# Patient Record
Sex: FEMALE | Race: WHITE | Employment: FULL TIME | URBAN - METROPOLITAN AREA
[De-identification: names, ages, dates, MRNs, and addresses within clinical notes are randomized per-mention and may not be internally consistent; named-entity substitution may affect disease eponyms.]

---

## 2017-05-22 ENCOUNTER — OFFICE VISIT (OUTPATIENT)
Dept: FAMILY MEDICINE CLINIC | Age: 62
End: 2017-05-22

## 2017-05-22 VITALS
RESPIRATION RATE: 16 BRPM | WEIGHT: 199 LBS | HEART RATE: 60 BPM | TEMPERATURE: 98.3 F | DIASTOLIC BLOOD PRESSURE: 76 MMHG | BODY MASS INDEX: 36.62 KG/M2 | HEIGHT: 62 IN | SYSTOLIC BLOOD PRESSURE: 124 MMHG

## 2017-05-22 DIAGNOSIS — R45.89 DEPRESSED MOOD: ICD-10-CM

## 2017-05-22 DIAGNOSIS — Z12.31 VISIT FOR SCREENING MAMMOGRAM: ICD-10-CM

## 2017-05-22 DIAGNOSIS — E78.2 MIXED HYPERLIPIDEMIA: Primary | ICD-10-CM

## 2017-05-22 DIAGNOSIS — F41.9 ANXIETY: ICD-10-CM

## 2017-05-22 PROCEDURE — 99214 OFFICE O/P EST MOD 30 MIN: CPT | Performed by: FAMILY MEDICINE

## 2017-05-22 ASSESSMENT — PATIENT HEALTH QUESTIONNAIRE - PHQ9
1. LITTLE INTEREST OR PLEASURE IN DOING THINGS: 0
SUM OF ALL RESPONSES TO PHQ QUESTIONS 1-9: 0
SUM OF ALL RESPONSES TO PHQ9 QUESTIONS 1 & 2: 0
2. FEELING DOWN, DEPRESSED OR HOPELESS: 0

## 2017-09-25 ENCOUNTER — OFFICE VISIT (OUTPATIENT)
Dept: FAMILY MEDICINE CLINIC | Age: 62
End: 2017-09-25

## 2017-09-25 VITALS
HEIGHT: 62 IN | DIASTOLIC BLOOD PRESSURE: 84 MMHG | TEMPERATURE: 97.7 F | RESPIRATION RATE: 14 BRPM | WEIGHT: 212 LBS | BODY MASS INDEX: 39.01 KG/M2 | HEART RATE: 72 BPM | SYSTOLIC BLOOD PRESSURE: 132 MMHG

## 2017-09-25 DIAGNOSIS — Z23 NEED FOR INFLUENZA VACCINATION: ICD-10-CM

## 2017-09-25 DIAGNOSIS — R45.89 DEPRESSED MOOD: ICD-10-CM

## 2017-09-25 DIAGNOSIS — Z12.4 CERVICAL CANCER SCREENING: Primary | ICD-10-CM

## 2017-09-25 DIAGNOSIS — F41.9 ANXIETY: ICD-10-CM

## 2017-09-25 DIAGNOSIS — E78.2 MIXED HYPERLIPIDEMIA: ICD-10-CM

## 2017-09-25 DIAGNOSIS — Z12.4 CERVICAL CANCER SCREENING: ICD-10-CM

## 2017-09-25 PROCEDURE — 99396 PREV VISIT EST AGE 40-64: CPT | Performed by: FAMILY MEDICINE

## 2017-09-25 PROCEDURE — 90686 IIV4 VACC NO PRSV 0.5 ML IM: CPT | Performed by: FAMILY MEDICINE

## 2017-09-25 PROCEDURE — 90471 IMMUNIZATION ADMIN: CPT | Performed by: FAMILY MEDICINE

## 2017-09-27 ENCOUNTER — HOSPITAL ENCOUNTER (OUTPATIENT)
Dept: WOMENS IMAGING | Age: 62
Discharge: HOME | End: 2017-09-27
Payer: COMMERCIAL

## 2017-09-27 DIAGNOSIS — Z12.31 VISIT FOR SCREENING MAMMOGRAM: ICD-10-CM

## 2017-09-27 PROCEDURE — G0202 SCR MAMMO BI INCL CAD: HCPCS

## 2017-09-28 LAB
HPV COMMENT: ABNORMAL
HPV TYPE 16: DETECTED
HPV TYPE 18: NOT DETECTED
HPVOH (OTHER TYPES): NOT DETECTED

## 2017-11-23 ENCOUNTER — APPOINTMENT (OUTPATIENT)
Dept: CT IMAGING | Age: 62
End: 2017-11-23
Payer: COMMERCIAL

## 2017-11-23 ENCOUNTER — HOSPITAL ENCOUNTER (EMERGENCY)
Age: 62
Discharge: HOME | End: 2017-11-23
Attending: EMERGENCY MEDICINE
Payer: COMMERCIAL

## 2017-11-23 VITALS
SYSTOLIC BLOOD PRESSURE: 140 MMHG | HEART RATE: 55 BPM | RESPIRATION RATE: 16 BRPM | HEIGHT: 63 IN | DIASTOLIC BLOOD PRESSURE: 72 MMHG | BODY MASS INDEX: 36.14 KG/M2 | WEIGHT: 204 LBS | TEMPERATURE: 97.5 F | OXYGEN SATURATION: 99 %

## 2017-11-23 DIAGNOSIS — N39.0 URINARY TRACT INFECTION WITHOUT HEMATURIA, SITE UNSPECIFIED: ICD-10-CM

## 2017-11-23 DIAGNOSIS — N20.0 NEPHROLITHIASIS: Primary | ICD-10-CM

## 2017-11-23 DIAGNOSIS — R11.0 NAUSEA: ICD-10-CM

## 2017-11-23 LAB
ALBUMIN SERPL-MCNC: 4 G/DL (ref 3.9–4.9)
ALP BLD-CCNC: 76 U/L (ref 40–130)
ALT SERPL-CCNC: 14 U/L (ref 0–33)
ANION GAP SERPL CALCULATED.3IONS-SCNC: 11 MEQ/L (ref 7–13)
AST SERPL-CCNC: 13 U/L (ref 0–35)
BACTERIA: ABNORMAL /HPF
BILIRUB SERPL-MCNC: 0.3 MG/DL (ref 0–1.2)
BILIRUBIN URINE: NEGATIVE
BLOOD, URINE: ABNORMAL
BUN BLDV-MCNC: 25 MG/DL (ref 8–23)
CALCIUM SERPL-MCNC: 9.4 MG/DL (ref 8.6–10.2)
CHLORIDE BLD-SCNC: 110 MEQ/L (ref 98–107)
CLARITY: ABNORMAL
CO2: 24 MEQ/L (ref 22–29)
COLOR: ABNORMAL
CREAT SERPL-MCNC: 0.64 MG/DL (ref 0.5–0.9)
EPITHELIAL CELLS, UA: ABNORMAL /HPF
GFR AFRICAN AMERICAN: >60
GFR NON-AFRICAN AMERICAN: >60
GLOBULIN: 2.4 G/DL (ref 2.3–3.5)
GLUCOSE BLD-MCNC: 108 MG/DL (ref 74–109)
GLUCOSE URINE: NEGATIVE MG/DL
HCT VFR BLD CALC: 41.2 % (ref 37–47)
HEMOGLOBIN: 14.2 G/DL (ref 12–16)
KETONES, URINE: NEGATIVE MG/DL
LEUKOCYTE ESTERASE, URINE: ABNORMAL
LIPASE: 27 U/L (ref 13–60)
MCH RBC QN AUTO: 31.8 PG (ref 27–31.3)
MCHC RBC AUTO-ENTMCNC: 34.6 % (ref 33–37)
MCV RBC AUTO: 92.1 FL (ref 82–100)
NITRITE, URINE: POSITIVE
PDW BLD-RTO: 13.4 % (ref 11.5–14.5)
PH UA: 5.5 (ref 5–9)
PLATELET # BLD: 161 K/UL (ref 130–400)
POTASSIUM SERPL-SCNC: 5 MEQ/L (ref 3.5–5.1)
PROTEIN UA: ABNORMAL MG/DL
RBC # BLD: 4.48 M/UL (ref 4.2–5.4)
RBC UA: ABNORMAL /HPF (ref 0–2)
RENAL EPITHELIAL, UA: ABNORMAL /HPF
SODIUM BLD-SCNC: 145 MEQ/L (ref 132–144)
SPECIFIC GRAVITY UA: 1.02 (ref 1–1.03)
TOTAL PROTEIN: 6.4 G/DL (ref 6.4–8.1)
URINE REFLEX TO CULTURE: YES
UROBILINOGEN, URINE: 0.2 E.U./DL
WBC # BLD: 8.1 K/UL (ref 4.8–10.8)
WBC UA: ABNORMAL /HPF (ref 0–5)

## 2017-11-23 PROCEDURE — 74176 CT ABD & PELVIS W/O CONTRAST: CPT

## 2017-11-23 PROCEDURE — 2580000003 HC RX 258: Performed by: PHYSICIAN ASSISTANT

## 2017-11-23 PROCEDURE — 87186 SC STD MICRODIL/AGAR DIL: CPT

## 2017-11-23 PROCEDURE — 80053 COMPREHEN METABOLIC PANEL: CPT

## 2017-11-23 PROCEDURE — 99284 EMERGENCY DEPT VISIT MOD MDM: CPT

## 2017-11-23 PROCEDURE — 87086 URINE CULTURE/COLONY COUNT: CPT

## 2017-11-23 PROCEDURE — 83690 ASSAY OF LIPASE: CPT

## 2017-11-23 PROCEDURE — 87077 CULTURE AEROBIC IDENTIFY: CPT

## 2017-11-23 PROCEDURE — 85027 COMPLETE CBC AUTOMATED: CPT

## 2017-11-23 PROCEDURE — 36415 COLL VENOUS BLD VENIPUNCTURE: CPT

## 2017-11-23 PROCEDURE — 81001 URINALYSIS AUTO W/SCOPE: CPT

## 2017-11-23 RX ORDER — TAMSULOSIN HYDROCHLORIDE 0.4 MG/1
0.4 CAPSULE ORAL DAILY
Qty: 5 CAPSULE | Refills: 0 | Status: SHIPPED | OUTPATIENT
Start: 2017-11-23 | End: 2017-12-11 | Stop reason: CLARIF

## 2017-11-23 RX ORDER — SODIUM CHLORIDE 9 MG/ML
INJECTION, SOLUTION INTRAVENOUS CONTINUOUS
Status: DISCONTINUED | OUTPATIENT
Start: 2017-11-23 | End: 2017-11-23 | Stop reason: HOSPADM

## 2017-11-23 RX ORDER — ONDANSETRON 4 MG/1
4 TABLET, ORALLY DISINTEGRATING ORAL EVERY 8 HOURS PRN
Qty: 12 TABLET | Refills: 0 | Status: SHIPPED | OUTPATIENT
Start: 2017-11-23 | End: 2018-01-16 | Stop reason: CLARIF

## 2017-11-23 RX ORDER — CIPROFLOXACIN 500 MG/1
500 TABLET, FILM COATED ORAL 2 TIMES DAILY
Qty: 14 TABLET | Refills: 0 | Status: SHIPPED | OUTPATIENT
Start: 2017-11-23 | End: 2017-11-30

## 2017-11-23 RX ORDER — OXYCODONE HYDROCHLORIDE AND ACETAMINOPHEN 5; 325 MG/1; MG/1
1 TABLET ORAL EVERY 4 HOURS PRN
Qty: 8 TABLET | Refills: 0 | Status: SHIPPED | OUTPATIENT
Start: 2017-11-23 | End: 2018-01-16 | Stop reason: CLARIF

## 2017-11-23 RX ORDER — PHENTERMINE HYDROCHLORIDE 37.5 MG/1
37.5 CAPSULE ORAL EVERY MORNING
COMMUNITY
End: 2017-12-11 | Stop reason: CLARIF

## 2017-11-23 RX ADMIN — SODIUM CHLORIDE: 9 INJECTION, SOLUTION INTRAVENOUS at 08:05

## 2017-11-23 ASSESSMENT — ENCOUNTER SYMPTOMS
ABDOMINAL PAIN: 1
COLOR CHANGE: 0
DIARRHEA: 0
VOMITING: 0
SHORTNESS OF BREATH: 0
CONSTIPATION: 0
SORE THROAT: 0
RHINORRHEA: 0
ABDOMINAL DISTENTION: 0
NAUSEA: 0
EYE DISCHARGE: 0

## 2017-11-23 NOTE — ED PROVIDER NOTES
[facility] ED  eMERGENCY dEPARTMENT eNCOUnter      Pt Name: Niki Chiang  MRN: [id number]  [place] 1955  Date of evaluation: 11/23/2017  Provider: Hilda Head PA-C    CHIEF COMPLAINT       Chief Complaint   Patient presents with    Abdominal Pain     right lower quadrant abdominal pain, woke up with it today. Pain gone now. HISTORY OF PRESENT ILLNESS   (Location/Symptom, Timing/Onset, Context/Setting, Quality, Duration, Modifying Factors, Severity)  Note limiting factors. Niki Chiang is a 58 y.o. female who presents to the emergency department With a complaint of right flank and right lower quadrant abdominal pain which started at 6 AM today. She states pain was sharp and intense causes nausea and diaphoresis for her she said it was so painful she can hardly move. She states on her way into the hospital her pain seemed to resolve. She denies any current pain 0 out of 10 note nausea vomiting no current diaphoresis. HPI    Nursing Notes were reviewed. REVIEW OF SYSTEMS    (2-9 systems for level 4, 10 or more for level 5)     Review of Systems   Constitutional: Negative for activity change and appetite change. HENT: Negative for congestion, ear discharge, ear pain, nosebleeds, rhinorrhea and sore throat. Eyes: Negative for discharge. Respiratory: Negative for shortness of breath. Cardiovascular: Negative for chest pain, palpitations and leg swelling. Gastrointestinal: Positive for abdominal pain. Negative for abdominal distention, constipation, diarrhea, nausea and vomiting. Genitourinary: Positive for flank pain. Negative for decreased urine volume, difficulty urinating, dysuria, frequency and urgency. Musculoskeletal: Negative for arthralgias and neck pain. Skin: Negative for color change. Neurological: Negative for dizziness, tremors, syncope, weakness, numbness and headaches. Psychiatric/Behavioral: Negative for agitation and confusion. (*)     All other components within normal limits   URINE RT REFLEX TO CULTURE - Abnormal; Notable for the following:     Color, UA STEPHANIE (*)     Clarity, UA CLOUDY (*)     Blood, Urine LARGE (*)     Protein, UA TRACE (*)     Nitrite, Urine POSITIVE (*)     Leukocyte Esterase, Urine SMALL (*)     All other components within normal limits   MICROSCOPIC URINALYSIS - Abnormal; Notable for the following:     RBC, UA 10-20 (*)     All other components within normal limits   URINE CULTURE   LIPASE       All other labs were within normal range or not returned as of this dictation. EMERGENCY DEPARTMENT COURSE and DIFFERENTIAL DIAGNOSIS/MDM:   Vitals:    Vitals:    11/23/17 0720   BP: 126/72   Pulse: 56   Resp: 16   Temp: 97.5 °F (36.4 °C)   TempSrc: Oral   SpO2: 98%   Weight: 204 lb (92.5 kg)   Height: 5' 2.5\" (1.588 m)         ED Course      MDM  Number of Diagnoses or Management Options  Nausea:   Nephrolithiasis:   Urinary tract infection without hematuria, site unspecified:   Diagnosis management comments: Patient has remained comfortable during her visit here in the emergency Department she's had no additional pain no nausea no vomiting. CT of the abdomen and pelvis shows she has punctate stone in the right UVJ and multiple stones within the right kidney within the left kidney as well. I did advise the patient that she may have recurrence of pain she was discharged home with Percocet and Flomax and Zofran for nausea and Cipro for mild urinary tract infection. She is advised of her condition changes in any way she has recurrence of pain increased pain with urination dysuria fever she should return to the emergency room immediately. She was also given follow-up with Dr. Aime Doshi of urology for further evaluation. CRITICAL CARE TIME   Total Critical Care time was 0 minutes, excluding separately reportable procedures.   There was a high probability of clinically significant/life threatening deterioration in the

## 2017-11-25 LAB
ORGANISM: ABNORMAL
URINE CULTURE, ROUTINE: ABNORMAL
URINE CULTURE, ROUTINE: ABNORMAL

## 2017-12-11 ENCOUNTER — TELEPHONE (OUTPATIENT)
Dept: UROLOGY | Age: 62
End: 2017-12-11

## 2017-12-11 ENCOUNTER — OFFICE VISIT (OUTPATIENT)
Dept: UROLOGY | Age: 62
End: 2017-12-11

## 2017-12-11 ENCOUNTER — HOSPITAL ENCOUNTER (OUTPATIENT)
Dept: GENERAL RADIOLOGY | Age: 62
Discharge: HOME | End: 2017-12-11
Payer: COMMERCIAL

## 2017-12-11 VITALS
WEIGHT: 198 LBS | DIASTOLIC BLOOD PRESSURE: 80 MMHG | HEART RATE: 64 BPM | BODY MASS INDEX: 35.08 KG/M2 | HEIGHT: 63 IN | SYSTOLIC BLOOD PRESSURE: 122 MMHG

## 2017-12-11 DIAGNOSIS — N20.0 KIDNEY STONE: Primary | ICD-10-CM

## 2017-12-11 DIAGNOSIS — N20.0 KIDNEY STONE: ICD-10-CM

## 2017-12-11 LAB
BILIRUBIN, POC: ABNORMAL
BLOOD URINE, POC: ABNORMAL
CLARITY, POC: CLEAR
COLOR, POC: YELLOW
GLUCOSE URINE, POC: ABNORMAL
KETONES, POC: ABNORMAL
LEUKOCYTE EST, POC: ABNORMAL
NITRITE, POC: ABNORMAL
PH, POC: 5
PROTEIN, POC: ABNORMAL
SPECIFIC GRAVITY, POC: 1.02
UROBILINOGEN, POC: 0.2

## 2017-12-11 PROCEDURE — G8427 DOCREV CUR MEDS BY ELIG CLIN: HCPCS | Performed by: UROLOGY

## 2017-12-11 PROCEDURE — 3014F SCREEN MAMMO DOC REV: CPT | Performed by: UROLOGY

## 2017-12-11 PROCEDURE — G8417 CALC BMI ABV UP PARAM F/U: HCPCS | Performed by: UROLOGY

## 2017-12-11 PROCEDURE — 99204 OFFICE O/P NEW MOD 45 MIN: CPT | Performed by: UROLOGY

## 2017-12-11 PROCEDURE — 1036F TOBACCO NON-USER: CPT | Performed by: UROLOGY

## 2017-12-11 PROCEDURE — 74000 XR ABDOMEN LIMITED (KUB): CPT

## 2017-12-11 PROCEDURE — G8484 FLU IMMUNIZE NO ADMIN: HCPCS | Performed by: UROLOGY

## 2017-12-11 PROCEDURE — 3017F COLORECTAL CA SCREEN DOC REV: CPT | Performed by: UROLOGY

## 2017-12-11 PROCEDURE — 81003 URINALYSIS AUTO W/O SCOPE: CPT | Performed by: UROLOGY

## 2017-12-11 ASSESSMENT — ENCOUNTER SYMPTOMS
RESPIRATORY NEGATIVE: 1
ALLERGIC/IMMUNOLOGIC NEGATIVE: 1
GASTROINTESTINAL NEGATIVE: 1

## 2017-12-11 NOTE — PROGRESS NOTES
Outpatient Prescriptions   Medication Sig Dispense Refill    metoprolol succinate (TOPROL XL) 50 MG extended release tablet Take 1 tablet by mouth daily 90 tablet 3    sertraline (ZOLOFT) 100 MG tablet Take 1 tablet by mouth daily 90 tablet 3    oxyCODONE-acetaminophen (PERCOCET) 5-325 MG per tablet Take 1 tablet by mouth every 4 hours as needed for Pain . 8 tablet 0    ondansetron (ZOFRAN ODT) 4 MG disintegrating tablet Take 1 tablet by mouth every 8 hours as needed for Nausea 12 tablet 0     No current facility-administered medications for this visit. Review of patient's allergies indicates no known allergies. reviewed      Review of Systems   Constitutional: Negative. HENT: Negative. Eyes: Positive for visual disturbance. Respiratory: Negative. Cardiovascular: Negative. Gastrointestinal: Negative. Genitourinary: Negative. Negative for difficulty urinating, dysuria, enuresis, flank pain, hematuria, pelvic pain and urgency. Musculoskeletal: Negative. Skin: Negative. Allergic/Immunologic: Negative. Neurological: Negative. Hematological: Negative. Does not bruise/bleed easily. Psychiatric/Behavioral: Negative. Objective:   Physical Exam   Constitutional: She appears well-developed and well-nourished. HENT:   Head: Normocephalic and atraumatic. Eyes: Conjunctivae are normal.   Neck: Neck supple. No tracheal deviation present. No thyromegaly present. Cardiovascular: Normal rate, regular rhythm and normal heart sounds. Pulmonary/Chest: Effort normal and breath sounds normal. No respiratory distress. She has no wheezes. She has no rales. Abdominal: Soft. Bowel sounds are normal. She exhibits no distension and no mass. There is no hepatosplenomegaly. There is no tenderness. There is no rebound, no guarding and no CVA tenderness. No hernia. Neurological: She is alert. Skin: Skin is warm. Psychiatric: She has a normal mood and affect.  Her behavior is 08:00  ANTIBIOTICS AT [facility]. :                      RECEIVED :  11/23/17 08:24   Culture & Susceptibility     ESCHERICHIA COLI     Antibiotic Interpretation DANIEL Unit   amoxicillin-clavulanate Intermediate 16 mcg/mL   ampicillin Resistant >=32 mcg/mL   ceFAZolin Sensitive <=4 mcg/mL   ciprofloxacin Sensitive <=0.25 mcg/mL   gentamicin Sensitive <=1 mcg/mL   nitrofurantoin Sensitive <=16 mcg/mL   trimethoprim-sulfamethoxazole Sensitive <=20 mcg/mL         Lab and Collection     11/23/2017  8:34 AM - Manan Gutiérrez Incoming Lab Results From Soft     Component Results     Component Value Ref Range & Units Status Collected Lab   Sodium 145   132 - 144 mEq/L Final 11/23/2017  8:00 AM [facility] - [facility]   Potassium 5.0  3.5 - 5.1 mEq/L Final 11/23/2017  8:00 AM [facility] - [facility]   Chloride 110   98 - 107 mEq/L Final 11/23/2017  8:00 AM [facility]   CO2 24  22 - 29 mEq/L Final 11/23/2017  8:00 AM [facility]   Anion Gap 11  7 - 13 mEq/L Final 11/23/2017  8:00 AM [facility]   Glucose 108  74 - 109 mg/dL Final 11/23/2017  8:00 AM [facility]   BUN 25   8 - 23 mg/dL Final 11/23/2017  8:00 AM [facility]   CREATININE 0.64  0.50 - 0.90 mg/dL Final 11/23/2017  8:00 AM [facility]   GFR Non- >60.0  >60 Final 11/23/2017  8:00 AM [facility]   >60 mL/min/1.73m2 EGFR, calc. for ages 25 and older using the   MDRD formula (not corrected for weight), is valid for stable   renal function.     GFR  >60.0  >60 Final 11/23/2017  8:00 AM [facility]   >60 mL/min/1.73m2 EGFR, calc. for ages 25 and older using the   MDRD formula (not corrected for weight), is valid for stable      11/23/2017  8:14 AM - Manan Gutiérrez Incoming Lab Results From Soft     Component Results     Component Value Ref Range & Units Status Collected Lab   WBC 8.1  4.8 - 10.8 K/uL Final 11/23/2017  8:00 AM  - [facility]

## 2017-12-13 LAB — URINE CULTURE, ROUTINE: NORMAL

## 2018-01-02 RX ORDER — SERTRALINE HYDROCHLORIDE 100 MG/1
100 TABLET, FILM COATED ORAL DAILY
Qty: 90 TABLET | Refills: 0 | Status: SHIPPED | OUTPATIENT
Start: 2018-01-02 | End: 2018-08-23 | Stop reason: ALTCHOICE

## 2018-01-02 RX ORDER — SERTRALINE HYDROCHLORIDE 100 MG/1
100 TABLET, FILM COATED ORAL DAILY
Qty: 90 TABLET | Refills: 0 | Status: SHIPPED | OUTPATIENT
Start: 2018-01-02 | End: 2018-01-02 | Stop reason: SDUPTHER

## 2018-01-02 RX ORDER — METOPROLOL SUCCINATE 50 MG/1
50 TABLET, EXTENDED RELEASE ORAL DAILY
Qty: 90 TABLET | Refills: 0 | Status: SHIPPED | OUTPATIENT
Start: 2018-01-02 | End: 2018-01-02 | Stop reason: SDUPTHER

## 2018-01-02 RX ORDER — METOPROLOL SUCCINATE 50 MG/1
50 TABLET, EXTENDED RELEASE ORAL DAILY
Qty: 90 TABLET | Refills: 0 | Status: SHIPPED | OUTPATIENT
Start: 2018-01-02 | End: 2018-02-12 | Stop reason: SDUPTHER

## 2018-01-15 ENCOUNTER — HOSPITAL ENCOUNTER (OUTPATIENT)
Dept: GENERAL RADIOLOGY | Age: 63
Discharge: HOME | End: 2018-01-15
Payer: COMMERCIAL

## 2018-01-15 VITALS
WEIGHT: 196 LBS | SYSTOLIC BLOOD PRESSURE: 121 MMHG | HEART RATE: 56 BPM | OXYGEN SATURATION: 96 % | BODY MASS INDEX: 34.73 KG/M2 | DIASTOLIC BLOOD PRESSURE: 83 MMHG | RESPIRATION RATE: 16 BRPM | HEIGHT: 63 IN

## 2018-01-15 DIAGNOSIS — N20.0 KIDNEY STONE: ICD-10-CM

## 2018-01-15 PROCEDURE — 74400 UROGRAPHY IV +-KUB TOMOG: CPT

## 2018-01-15 PROCEDURE — 6360000004 HC RX CONTRAST MEDICATION: Performed by: UROLOGY

## 2018-01-15 RX ADMIN — IOVERSOL 100 ML: 678 INJECTION INTRA-ARTERIAL; INTRAVENOUS at 09:15

## 2018-01-16 ENCOUNTER — OFFICE VISIT (OUTPATIENT)
Dept: UROLOGY | Age: 63
End: 2018-01-16

## 2018-01-16 VITALS
DIASTOLIC BLOOD PRESSURE: 100 MMHG | SYSTOLIC BLOOD PRESSURE: 150 MMHG | HEART RATE: 70 BPM | WEIGHT: 196 LBS | HEIGHT: 63 IN | BODY MASS INDEX: 34.73 KG/M2

## 2018-01-16 DIAGNOSIS — N20.0 KIDNEY STONES: Primary | ICD-10-CM

## 2018-01-16 PROCEDURE — G8417 CALC BMI ABV UP PARAM F/U: HCPCS | Performed by: UROLOGY

## 2018-01-16 PROCEDURE — 3017F COLORECTAL CA SCREEN DOC REV: CPT | Performed by: UROLOGY

## 2018-01-16 PROCEDURE — G8484 FLU IMMUNIZE NO ADMIN: HCPCS | Performed by: UROLOGY

## 2018-01-16 PROCEDURE — G8427 DOCREV CUR MEDS BY ELIG CLIN: HCPCS | Performed by: UROLOGY

## 2018-01-16 PROCEDURE — 1036F TOBACCO NON-USER: CPT | Performed by: UROLOGY

## 2018-01-16 PROCEDURE — 99214 OFFICE O/P EST MOD 30 MIN: CPT | Performed by: UROLOGY

## 2018-01-16 PROCEDURE — 3014F SCREEN MAMMO DOC REV: CPT | Performed by: UROLOGY

## 2018-01-16 RX ORDER — POTASSIUM CITRATE 10 MEQ/1
10 TABLET, EXTENDED RELEASE ORAL
Qty: 270 TABLET | Refills: 3 | Status: SHIPPED | OUTPATIENT
Start: 2018-01-16 | End: 2018-03-12

## 2018-01-16 ASSESSMENT — ENCOUNTER SYMPTOMS
ABDOMINAL PAIN: 0
ABDOMINAL DISTENTION: 0

## 2018-01-16 NOTE — PROGRESS NOTES
Subjective:      Patient ID: Kaya Garcia is a 58 y.o. female. HPI  This is a 59 yo female with HTN, Depression, OA and back with recent episde of Rt renal colic that resolved when last seen. This was her first episode of colic. She has no further pain or symptoms. The prior CT showed a 12 mm LP stone on the Lt and smaller bilateral non-obstructing stones with low HU. I reviewed the interval IVP on PACS today and with the patient in detail. The stones are not well visualized given the radiolucent nature. Her urine Ph has been low since 2015 and this suggests possible Uric Acid stones. Her mother has gout. 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 She has no other complaints. Past Medical History:   Diagnosis Date    Anxiety     Depressed mood     HPV test positive      Past Surgical History:   Procedure Laterality Date    COLONOSCOPY  4/25/16        COLPOSCOPY  2011?    [facility]  2007     Social History     Social History    Marital status:      Spouse name: N/A    Number of children: N/A    Years of education: N/A     Social History Main Topics    Smoking status: Never Smoker    Smokeless tobacco: Never Used    Alcohol use 0.0 oz/week      Comment: occ    Drug use: No    Sexual activity: Not Asked     Other Topics Concern    None     Social History Narrative    None     Family History   Problem Relation Age of Onset    Heart Disease Mother     Kidney Disease Mother     Heart Disease Father      Current Outpatient Prescriptions   Medication Sig Dispense Refill    potassium citrate (UROCIT-K 10) 10 MEQ (1080 MG) extended release tablet Take 1 tablet by mouth 3 times daily (with meals) 270 tablet 3    sertraline (ZOLOFT) 100 MG tablet Take 1 tablet by mouth daily 90 tablet 0    metoprolol succinate (TOPROL XL) 50 MG extended release tablet Take 1 tablet by mouth daily 90 tablet 0     No current facility-administered

## 2018-02-12 ENCOUNTER — OFFICE VISIT (OUTPATIENT)
Dept: FAMILY MEDICINE CLINIC | Age: 63
End: 2018-02-12
Payer: COMMERCIAL

## 2018-02-12 VITALS
DIASTOLIC BLOOD PRESSURE: 72 MMHG | BODY MASS INDEX: 34.55 KG/M2 | HEIGHT: 63 IN | WEIGHT: 195 LBS | SYSTOLIC BLOOD PRESSURE: 136 MMHG | TEMPERATURE: 97.9 F | RESPIRATION RATE: 16 BRPM | HEART RATE: 72 BPM

## 2018-02-12 DIAGNOSIS — B02.9 HERPES ZOSTER WITHOUT COMPLICATION: Primary | ICD-10-CM

## 2018-02-12 DIAGNOSIS — F41.9 ANXIETY: ICD-10-CM

## 2018-02-12 DIAGNOSIS — I10 ESSENTIAL HYPERTENSION: ICD-10-CM

## 2018-02-12 DIAGNOSIS — E78.2 MIXED HYPERLIPIDEMIA: ICD-10-CM

## 2018-02-12 DIAGNOSIS — R45.89 DEPRESSED MOOD: ICD-10-CM

## 2018-02-12 PROCEDURE — 99214 OFFICE O/P EST MOD 30 MIN: CPT | Performed by: FAMILY MEDICINE

## 2018-02-12 PROCEDURE — G8484 FLU IMMUNIZE NO ADMIN: HCPCS | Performed by: FAMILY MEDICINE

## 2018-02-12 PROCEDURE — 3017F COLORECTAL CA SCREEN DOC REV: CPT | Performed by: FAMILY MEDICINE

## 2018-02-12 PROCEDURE — G8427 DOCREV CUR MEDS BY ELIG CLIN: HCPCS | Performed by: FAMILY MEDICINE

## 2018-02-12 PROCEDURE — G8417 CALC BMI ABV UP PARAM F/U: HCPCS | Performed by: FAMILY MEDICINE

## 2018-02-12 PROCEDURE — 3014F SCREEN MAMMO DOC REV: CPT | Performed by: FAMILY MEDICINE

## 2018-02-12 PROCEDURE — 1036F TOBACCO NON-USER: CPT | Performed by: FAMILY MEDICINE

## 2018-02-12 RX ORDER — VALACYCLOVIR HYDROCHLORIDE 1 G/1
1000 TABLET, FILM COATED ORAL 3 TIMES DAILY
Qty: 21 TABLET | Refills: 0 | Status: SHIPPED | OUTPATIENT
Start: 2018-02-12 | End: 2018-08-23 | Stop reason: ALTCHOICE

## 2018-02-12 RX ORDER — ACETAMINOPHEN AND CODEINE PHOSPHATE 300; 30 MG/1; MG/1
1 TABLET ORAL EVERY 4 HOURS PRN
Qty: 30 TABLET | Refills: 0 | Status: SHIPPED | OUTPATIENT
Start: 2018-02-12 | End: 2018-02-22

## 2018-02-12 RX ORDER — METOPROLOL SUCCINATE 50 MG/1
50 TABLET, EXTENDED RELEASE ORAL DAILY
Qty: 90 TABLET | Refills: 1 | Status: SHIPPED | OUTPATIENT
Start: 2018-02-12 | End: 2018-06-18 | Stop reason: DRUGHIGH

## 2018-02-12 NOTE — PROGRESS NOTES
acetaminophen-codeine (TYLENOL/CODEINE #3) 300-30 MG per tablet   2. Mixed hyperlipidemia     3. Depressed mood     4. Anxiety     5. Essential hypertension  metoprolol succinate (TOPROL XL) 50 MG extended release tablet       Mood is ok  Pain 3-5/10  Can take OTC nsaids during day 600-800mg motrin qd x 5-7d    Herpes Zoster -  Antivirals per med orders. Pt education including discussing typical course of shingles, treatment and potential for PHN. Advised pt to call or follow up if any signif changes or no improvement over expected time period. Red flags and precautions reviewed.   Also uro told her to take K never took-so begin K and cmp lipids and cbc    Raymond Swanson MD

## 2018-02-12 NOTE — PATIENT INSTRUCTIONS
acetaminophen (Tylenol), ibuprofen (Advil, Motrin), or naproxen (Aleve). Read and follow all instructions on the label. · Avoid close contact with people until the blisters have healed. It is very important for you to avoid contact with anyone who has never had chickenpox or the chickenpox vaccine. Pregnant women, young babies, and anyone else who has a hard time fighting infection (such as someone with HIV, diabetes, or cancer) is especially at risk. When should you call for help? Call your doctor now or seek immediate medical care if:  ? · You have a new or higher fever. ? · You have a severe headache and a stiff neck. ? · You lose the ability to think clearly. ? · The rash spreads to your forehead, nose, eyes, or eyelids. ? · You have eye pain, or your vision gets worse. ? · You have new pain in your face, or you cannot move the muscles in your face. ? · Blisters spread to new parts of your body. ? Watch closely for changes in your health, and be sure to contact your doctor if:  ? · The rash has not healed after 2 to 4 weeks. ? · You still have pain after the rash has healed. Where can you learn more? Go to [email or web address] org and sign in to your "Hey, Neighbor!" account. Devi Nascimento in the [facility] box to learn more about \"Shingles: Care Instructions. \"     If you do not have an account, please click on the \"Sign Up Now\" link. Current as of: March 3, 2017  Content Version: 11.5  © 3817-5745 [employer]wise, [employer]. Care instructions adapted under license by [facility] ([facility]). If you have questions about a medical condition or this instruction, always ask your healthcare professional. [place] [address] any warranty or liability for your use of this information.

## 2018-02-12 NOTE — PROGRESS NOTES
Subjective  Rowdy Khalil, 58 y.o. female presents today with:  Chief Complaint   Patient presents with    Rash     right upper arm--pain started thursday, rash appeared saturday           Past Medical History:   Diagnosis Date    Anxiety     Depressed mood     HPV test positive      Past Surgical History:   Procedure Laterality Date    COLONOSCOPY  4/25/16        COLPOSCOPY  2011?  TIBIA FRACTURE SURGERY  2007     Social History     Social History    Marital status:      Spouse name: N/A    Number of children: N/A    Years of education: N/A     Occupational History    Not on file. Social History Main Topics    Smoking status: Never Smoker    Smokeless tobacco: Never Used    Alcohol use 0.0 oz/week      Comment: occ    Drug use: No    Sexual activity: Not on file     Other Topics Concern    Not on file     Social History Narrative    No narrative on file     Family History   Problem Relation Age of Onset    Heart Disease Mother     Kidney Disease Mother     Heart Disease Father      No Known Allergies  Current Outpatient Prescriptions   Medication Sig Dispense Refill    potassium citrate (UROCIT-K 10) 10 MEQ (1080 MG) extended release tablet Take 1 tablet by mouth 3 times daily (with meals) 270 tablet 3    sertraline (ZOLOFT) 100 MG tablet Take 1 tablet by mouth daily 90 tablet 0    metoprolol succinate (TOPROL XL) 50 MG extended release tablet Take 1 tablet by mouth daily 90 tablet 0     No current facility-administered medications for this visit. The patient denies any history of      seizures,             heart attack or KNOWN CAD        or stroke. No chest pain, shortness of breath, paroxysmal nocturnal dyspnea. No nausea, vomiting, diarrhea, hematochezia or melena. No paresthesias or headaches. No dysuria, frequency or hematuria.       Last labs  Office Visit on 12/11/2017   Component Date Value Ref Range Status    Color, UA 12/11/2017 yellow   Final    Clarity, UA 12/11/2017 clear   Final    Glucose, UA POC 12/11/2017 neg   Final    Bilirubin, UA 12/11/2017 neg   Final    Ketones, UA 12/11/2017 neg   Final    Spec Grav, UA 12/11/2017 1.025   Final    Blood, UA POC 12/11/2017 large   Final    pH, UA 12/11/2017 5.0   Final    Protein, UA POC 12/11/2017 neg   Final    Urobilinogen, UA 12/11/2017 0.2   Final    Leukocytes, UA 12/11/2017 trace   Final    Nitrite, UA 12/11/2017 neg   Final    Urine Culture, Routine 12/13/2017 No growth 24 hours   Final   Admission on 11/23/2017, Discharged on 11/23/2017   Component Date Value Ref Range Status    Sodium 11/23/2017 145* 132 - 144 mEq/L Final    Potassium 11/23/2017 5.0  3.5 - 5.1 mEq/L Final    Chloride 11/23/2017 110* 98 - 107 mEq/L Final    CO2 11/23/2017 24  22 - 29 mEq/L Final    Anion Gap 11/23/2017 11  7 - 13 mEq/L Final    Glucose 11/23/2017 108  74 - 109 mg/dL Final    BUN 11/23/2017 25* 8 - 23 mg/dL Final    CREATININE 11/23/2017 0.64  0.50 - 0.90 mg/dL Final    GFR Non- 11/23/2017 >60.0  >60 Final    Comment: >60 mL/min/1.73m2 EGFR, calc. for ages 25 and older using the  MDRD formula (not corrected for weight), is valid for stable  renal function.  GFR  11/23/2017 >60.0  >60 Final    Comment: >60 mL/min/1.73m2 EGFR, calc. for ages 25 and older using the  MDRD formula (not corrected for weight), is valid for stable  renal function.       Calcium 11/23/2017 9.4  8.6 - 10.2 mg/dL Final    Total Protein 11/23/2017 6.4  6.4 - 8.1 g/dL Final    Alb 11/23/2017 4.0  3.9 - 4.9 g/dL Final    Total Bilirubin 11/23/2017 0.3  0.0 - 1.2 mg/dL Final    Alkaline Phosphatase 11/23/2017 76  40 - 130 U/L Final    ALT 11/23/2017 14  0 - 33 U/L Final    AST 11/23/2017 13  0 - 35 U/L Final    Globulin 11/23/2017 2.4  2.3 - 3.5 g/dL Final    WBC 11/23/2017 8.1  4.8 - 10.8 K/uL Final    RBC 11/23/2017 4.48  4.20 - 5.40 M/uL Final    Hemoglobin 11/23/2017 14.2  12.0 - 16.0 g/dL Final    Hematocrit 11/23/2017 41.2  37.0 - 47.0 % Final    MCV 11/23/2017 92.1  82.0 - 100.0 fL Final    MCH 11/23/2017 31.8* 27.0 - 31.3 pg Final    MCHC 11/23/2017 34.6  33.0 - 37.0 % Final    RDW 11/23/2017 13.4  11.5 - 14.5 % Final    Platelets 50/50/4229 161  130 - 400 K/uL Final    Lipase 11/23/2017 27  13 - 60 U/L Final    Color, UA 11/23/2017 STEPHANIE* Straw/Yellow Final    Clarity, UA 11/23/2017 CLOUDY* Clear Final    Glucose, Ur 11/23/2017 Negative  Negative mg/dL Final    Bilirubin Urine 11/23/2017 Negative  Negative Final    Ketones, Urine 11/23/2017 Negative  Negative mg/dL Final    Specific Gravity, UA 11/23/2017 1.018  1.005 - 1.030 Final    Blood, Urine 11/23/2017 LARGE* Negative Final    pH, UA 11/23/2017 5.5  5.0 - 9.0 Final    Protein, UA 11/23/2017 TRACE* Negative mg/dL Final    Urobilinogen, Urine 11/23/2017 0.2  <2.0 E.U./dL Final    Nitrite, Urine 11/23/2017 POSITIVE* Negative Final    Leukocyte Esterase, Urine 11/23/2017 SMALL* Negative Final    Urine Reflex to Culture 11/23/2017 YES   Final    Organism 11/25/2017 Escherichia coli*  Final    Urine Culture, Routine 11/25/2017 >100,000 CFU/ml   Final    WBC, UA 11/23/2017 3-5  0 - 5 /HPF Final    RBC, UA 11/23/2017 10-20* 0 - 2 /HPF Final    Epi Cells 11/23/2017 3-5  /HPF Final    Renal Epithelial, Urine 11/23/2017 0-2  /HPF Final    Bacteria, UA 11/23/2017 Many  /HPF Final     Health Maintenance   Topic Date Due    Hepatitis C screen  1955    HIV screen  06/05/1970    Diabetes screen  06/05/1995    Zostavax vaccine  09/25/2018 (Originally 6/5/2015)    DTaP/Tdap/Td vaccine (1 - Tdap) 09/25/2018 (Originally 6/5/1974)    Breast cancer screen  09/27/2019    Colon cancer screen colonoscopy  04/25/2021    Lipid screen  12/17/2021    Cervical cancer screen  09/25/2022    Flu vaccine  Completed       No results found for this visit on 02/12/18.       Objective    Vitals:

## 2018-03-03 DIAGNOSIS — E78.2 MIXED HYPERLIPIDEMIA: Primary | ICD-10-CM

## 2018-03-12 ENCOUNTER — OFFICE VISIT (OUTPATIENT)
Dept: FAMILY MEDICINE CLINIC | Age: 63
End: 2018-03-12
Payer: COMMERCIAL

## 2018-03-12 VITALS
HEART RATE: 62 BPM | DIASTOLIC BLOOD PRESSURE: 70 MMHG | BODY MASS INDEX: 33.49 KG/M2 | SYSTOLIC BLOOD PRESSURE: 112 MMHG | RESPIRATION RATE: 14 BRPM | HEIGHT: 63 IN | WEIGHT: 189 LBS | OXYGEN SATURATION: 98 % | TEMPERATURE: 98.8 F

## 2018-03-12 DIAGNOSIS — R73.9 HYPERGLYCEMIA: ICD-10-CM

## 2018-03-12 DIAGNOSIS — E78.2 MIXED HYPERLIPIDEMIA: Primary | ICD-10-CM

## 2018-03-12 DIAGNOSIS — F41.9 ANXIETY: ICD-10-CM

## 2018-03-12 DIAGNOSIS — I21.3 ST ELEVATION MYOCARDIAL INFARCTION (STEMI), UNSPECIFIED ARTERY (HCC): ICD-10-CM

## 2018-03-12 DIAGNOSIS — R45.89 DEPRESSED MOOD: ICD-10-CM

## 2018-03-12 LAB
HBA1C MFR BLD: 5.4 % (ref 4.8–5.9)
HCT VFR BLD CALC: 39.1 % (ref 37–47)
HEMOGLOBIN: 13.1 G/DL (ref 12–16)
MCH RBC QN AUTO: 31.7 PG (ref 27–31.3)
MCHC RBC AUTO-ENTMCNC: 33.6 % (ref 33–37)
MCV RBC AUTO: 94.4 FL (ref 82–100)
PDW BLD-RTO: 13.9 % (ref 11.5–14.5)
PLATELET # BLD: 209 K/UL (ref 130–400)
RBC # BLD: 4.14 M/UL (ref 4.2–5.4)
WBC # BLD: 8.9 K/UL (ref 4.8–10.8)

## 2018-03-12 PROCEDURE — G8427 DOCREV CUR MEDS BY ELIG CLIN: HCPCS | Performed by: FAMILY MEDICINE

## 2018-03-12 PROCEDURE — 3017F COLORECTAL CA SCREEN DOC REV: CPT | Performed by: FAMILY MEDICINE

## 2018-03-12 PROCEDURE — G8482 FLU IMMUNIZE ORDER/ADMIN: HCPCS | Performed by: FAMILY MEDICINE

## 2018-03-12 PROCEDURE — 99214 OFFICE O/P EST MOD 30 MIN: CPT | Performed by: FAMILY MEDICINE

## 2018-03-12 PROCEDURE — G8417 CALC BMI ABV UP PARAM F/U: HCPCS | Performed by: FAMILY MEDICINE

## 2018-03-12 PROCEDURE — G8599 NO ASA/ANTIPLAT THER USE RNG: HCPCS | Performed by: FAMILY MEDICINE

## 2018-03-12 PROCEDURE — 3014F SCREEN MAMMO DOC REV: CPT | Performed by: FAMILY MEDICINE

## 2018-03-12 PROCEDURE — 1036F TOBACCO NON-USER: CPT | Performed by: FAMILY MEDICINE

## 2018-03-12 RX ORDER — TICAGRELOR 90 MG/1
TABLET ORAL
COMMUNITY
Start: 2018-03-06

## 2018-03-12 RX ORDER — NITROGLYCERIN 0.4 MG/1
TABLET SUBLINGUAL
Refills: 3 | COMMUNITY
Start: 2018-03-06

## 2018-03-12 RX ORDER — ATORVASTATIN CALCIUM 80 MG/1
TABLET, FILM COATED ORAL
Refills: 12 | COMMUNITY
Start: 2018-03-06

## 2018-03-12 RX ORDER — LISINOPRIL 5 MG/1
TABLET ORAL
Refills: 12 | COMMUNITY
Start: 2018-03-06 | End: 2018-09-12 | Stop reason: ALTCHOICE

## 2018-03-12 NOTE — PROGRESS NOTES
Subjective  Stan Mckinley, 58 y.o. female presents today with:  Chief Complaint   Patient presents with    ED Follow-up    Anxiety    Depression    Hyperlipidemia     Was at work and had CP went to ER after about and hour of pain   VFIB x3!!   Stents now x 5        Past Medical History:   Diagnosis Date    Anxiety     Depressed mood     HPV test positive     STEMI (ST elevation myocardial infarction) ([facility] [address] 03/2018     Past Surgical History:   Procedure Laterality Date    CARDIAC CATHETERIZATION  03/02/2018    LADx2 RCA x1 OM x2     COLONOSCOPY  4/25/16        COLPOSCOPY  2011?  TIBIA FRACTURE SURGERY  2007     Social History     Social History    Marital status:      Spouse name: N/A    Number of children: N/A    Years of education: N/A     Occupational History    Not on file. Social History Main Topics    Smoking status: Never Smoker    Smokeless tobacco: Never Used    Alcohol use 0.0 oz/week      Comment: occ    Drug use: No    Sexual activity: Not on file     Other Topics Concern    Not on file     Social History Narrative    No narrative on file     Family History   Problem Relation Age of Onset    Heart Disease Mother     Kidney Disease Mother     Heart Disease Father      No Known Allergies  Current Outpatient Prescriptions   Medication Sig Dispense Refill    atorvastatin (LIPITOR) 80 MG tablet TAKE 1 TABLET BY MOUTH DAILY IN THE EVENING  12    lisinopril (PRINIVIL;ZESTRIL) 5 MG tablet take 1/2 tablet by mouth daily  12    nitroGLYCERIN (NITROSTAT) 0.4 MG SL tablet DISSOLVE 1 (ONE) TABLET UNDER THE TONGUE AS NEEDED FOR CHEST PAIN. MAY REPEAT EVERY 5 MINUTES IF NEEDED; MAX OF 3 DOSES.   IF NO RELIEF, RAFI  3    BRILINTA 90 MG TABS tablet       metoprolol succinate (TOPROL XL) 50 MG extended release tablet Take 1 tablet by mouth daily 90 tablet 1    valACYclovir (VALTREX) 1 g tablet Take 1 tablet by mouth 3 times daily 21 tablet 0    sertraline (ZOLOFT)

## 2018-03-13 LAB
ALBUMIN SERPL-MCNC: 3.9 G/DL (ref 3.9–4.9)
ALP BLD-CCNC: 109 U/L (ref 40–130)
ALT SERPL-CCNC: 25 U/L (ref 0–33)
ANION GAP SERPL CALCULATED.3IONS-SCNC: 15 MEQ/L (ref 7–13)
AST SERPL-CCNC: 17 U/L (ref 0–35)
BILIRUB SERPL-MCNC: 0.3 MG/DL (ref 0–1.2)
BUN BLDV-MCNC: 24 MG/DL (ref 8–23)
CALCIUM SERPL-MCNC: 8.5 MG/DL (ref 8.6–10.2)
CHLORIDE BLD-SCNC: 105 MEQ/L (ref 98–107)
CO2: 22 MEQ/L (ref 22–29)
CREAT SERPL-MCNC: 0.68 MG/DL (ref 0.5–0.9)
GFR AFRICAN AMERICAN: >60
GFR NON-AFRICAN AMERICAN: >60
GLOBULIN: 2.3 G/DL (ref 2.3–3.5)
GLUCOSE BLD-MCNC: 95 MG/DL (ref 74–109)
POTASSIUM SERPL-SCNC: 4 MEQ/L (ref 3.5–5.1)
SODIUM BLD-SCNC: 142 MEQ/L (ref 132–144)
TOTAL PROTEIN: 6.2 G/DL (ref 6.4–8.1)

## 2018-06-09 DIAGNOSIS — E78.2 MIXED HYPERLIPIDEMIA: ICD-10-CM

## 2018-06-09 LAB
ALBUMIN SERPL-MCNC: 4.2 G/DL (ref 3.9–4.9)
ALP BLD-CCNC: 97 U/L (ref 40–130)
ALT SERPL-CCNC: 26 U/L (ref 0–33)
ANION GAP SERPL CALCULATED.3IONS-SCNC: 16 MEQ/L (ref 7–13)
AST SERPL-CCNC: 19 U/L (ref 0–35)
BASOPHILS ABSOLUTE: 0 K/UL (ref 0–0.2)
BASOPHILS RELATIVE PERCENT: 0.6 %
BILIRUB SERPL-MCNC: 0.4 MG/DL (ref 0–1.2)
BUN BLDV-MCNC: 25 MG/DL (ref 8–23)
CALCIUM SERPL-MCNC: 9.1 MG/DL (ref 8.6–10.2)
CHLORIDE BLD-SCNC: 101 MEQ/L (ref 98–107)
CHOLESTEROL, TOTAL: 130 MG/DL (ref 0–199)
CO2: 23 MEQ/L (ref 22–29)
CREAT SERPL-MCNC: 0.68 MG/DL (ref 0.5–0.9)
EOSINOPHILS ABSOLUTE: 0.2 K/UL (ref 0–0.7)
EOSINOPHILS RELATIVE PERCENT: 3.5 %
GFR AFRICAN AMERICAN: >60
GFR NON-AFRICAN AMERICAN: >60
GLOBULIN: 2.8 G/DL (ref 2.3–3.5)
GLUCOSE BLD-MCNC: 62 MG/DL (ref 74–109)
HCT VFR BLD CALC: 41.5 % (ref 37–47)
HDLC SERPL-MCNC: 48 MG/DL (ref 40–59)
HEMOGLOBIN: 13.8 G/DL (ref 12–16)
LDL CHOLESTEROL CALCULATED: 69 MG/DL (ref 0–129)
LYMPHOCYTES ABSOLUTE: 1.7 K/UL (ref 1–4.8)
LYMPHOCYTES RELATIVE PERCENT: 33 %
MCH RBC QN AUTO: 31.7 PG (ref 27–31.3)
MCHC RBC AUTO-ENTMCNC: 33.2 % (ref 33–37)
MCV RBC AUTO: 95.5 FL (ref 82–100)
MONOCYTES ABSOLUTE: 0.4 K/UL (ref 0.2–0.8)
MONOCYTES RELATIVE PERCENT: 7.7 %
NEUTROPHILS ABSOLUTE: 2.9 K/UL (ref 1.4–6.5)
NEUTROPHILS RELATIVE PERCENT: 55.2 %
PDW BLD-RTO: 13.4 % (ref 11.5–14.5)
PLATELET # BLD: 178 K/UL (ref 130–400)
POTASSIUM SERPL-SCNC: 4.1 MEQ/L (ref 3.5–5.1)
RBC # BLD: 4.35 M/UL (ref 4.2–5.4)
SODIUM BLD-SCNC: 140 MEQ/L (ref 132–144)
TOTAL PROTEIN: 7 G/DL (ref 6.4–8.1)
TRIGL SERPL-MCNC: 65 MG/DL (ref 0–200)
WBC # BLD: 5.3 K/UL (ref 4.8–10.8)

## 2018-06-18 ENCOUNTER — OFFICE VISIT (OUTPATIENT)
Dept: FAMILY MEDICINE CLINIC | Age: 63
End: 2018-06-18
Payer: COMMERCIAL

## 2018-06-18 VITALS
SYSTOLIC BLOOD PRESSURE: 124 MMHG | DIASTOLIC BLOOD PRESSURE: 76 MMHG | RESPIRATION RATE: 16 BRPM | WEIGHT: 199 LBS | BODY MASS INDEX: 35.26 KG/M2 | HEART RATE: 72 BPM | HEIGHT: 63 IN | TEMPERATURE: 97.7 F

## 2018-06-18 DIAGNOSIS — F41.9 ANXIETY: ICD-10-CM

## 2018-06-18 DIAGNOSIS — R31.9 HEMATURIA, UNSPECIFIED TYPE: ICD-10-CM

## 2018-06-18 DIAGNOSIS — E78.2 MIXED HYPERLIPIDEMIA: Primary | ICD-10-CM

## 2018-06-18 DIAGNOSIS — G57.12 MERALGIA PARESTHETICA OF LEFT SIDE: ICD-10-CM

## 2018-06-18 PROBLEM — I25.2 HISTORY OF ST ELEVATION MYOCARDIAL INFARCTION (STEMI): Status: ACTIVE | Noted: 2018-03-12

## 2018-06-18 LAB
BILIRUBIN, POC: ABNORMAL
BLOOD URINE, POC: ABNORMAL
CLARITY, POC: ABNORMAL
COLOR, POC: ABNORMAL
GLUCOSE URINE, POC: ABNORMAL
KETONES, POC: ABNORMAL
LEUKOCYTE EST, POC: ABNORMAL
NITRITE, POC: ABNORMAL
PH, POC: 5.5
PROTEIN, POC: ABNORMAL
SPECIFIC GRAVITY, POC: 1.03
UROBILINOGEN, POC: ABNORMAL

## 2018-06-18 PROCEDURE — 1036F TOBACCO NON-USER: CPT | Performed by: FAMILY MEDICINE

## 2018-06-18 PROCEDURE — 81003 URINALYSIS AUTO W/O SCOPE: CPT | Performed by: FAMILY MEDICINE

## 2018-06-18 PROCEDURE — G8427 DOCREV CUR MEDS BY ELIG CLIN: HCPCS | Performed by: FAMILY MEDICINE

## 2018-06-18 PROCEDURE — 99214 OFFICE O/P EST MOD 30 MIN: CPT | Performed by: FAMILY MEDICINE

## 2018-06-18 PROCEDURE — G8417 CALC BMI ABV UP PARAM F/U: HCPCS | Performed by: FAMILY MEDICINE

## 2018-06-18 PROCEDURE — G8599 NO ASA/ANTIPLAT THER USE RNG: HCPCS | Performed by: FAMILY MEDICINE

## 2018-06-18 PROCEDURE — 3017F COLORECTAL CA SCREEN DOC REV: CPT | Performed by: FAMILY MEDICINE

## 2018-06-18 RX ORDER — METOPROLOL SUCCINATE 25 MG/1
TABLET, EXTENDED RELEASE ORAL
Refills: 3 | COMMUNITY
Start: 2018-06-02

## 2018-06-18 RX ORDER — CEPHALEXIN 500 MG/1
500 CAPSULE ORAL 3 TIMES DAILY
Qty: 30 CAPSULE | Refills: 0 | Status: SHIPPED | OUTPATIENT
Start: 2018-06-18 | End: 2018-08-23 | Stop reason: ALTCHOICE

## 2018-06-18 ASSESSMENT — PATIENT HEALTH QUESTIONNAIRE - PHQ9
SUM OF ALL RESPONSES TO PHQ QUESTIONS 1-9: 0
2. FEELING DOWN, DEPRESSED OR HOPELESS: 0
1. LITTLE INTEREST OR PLEASURE IN DOING THINGS: 0
SUM OF ALL RESPONSES TO PHQ9 QUESTIONS 1 & 2: 0

## 2018-06-20 LAB — URINE CULTURE, ROUTINE: NORMAL

## 2018-08-18 ENCOUNTER — HOSPITAL ENCOUNTER (EMERGENCY)
Age: 63
Discharge: HOME | End: 2018-08-18
Payer: COMMERCIAL

## 2018-08-18 ENCOUNTER — APPOINTMENT (OUTPATIENT)
Dept: CT IMAGING | Age: 63
End: 2018-08-18
Payer: COMMERCIAL

## 2018-08-18 VITALS
HEIGHT: 63 IN | OXYGEN SATURATION: 100 % | HEART RATE: 82 BPM | BODY MASS INDEX: 34.38 KG/M2 | DIASTOLIC BLOOD PRESSURE: 81 MMHG | WEIGHT: 194 LBS | RESPIRATION RATE: 16 BRPM | TEMPERATURE: 97.5 F | SYSTOLIC BLOOD PRESSURE: 140 MMHG

## 2018-08-18 DIAGNOSIS — N20.0 NEPHROLITHIASIS: Primary | ICD-10-CM

## 2018-08-18 LAB
ALBUMIN SERPL-MCNC: 4.1 G/DL (ref 3.9–4.9)
ALP BLD-CCNC: 83 U/L (ref 40–130)
ALT SERPL-CCNC: 23 U/L (ref 0–33)
ANION GAP SERPL CALCULATED.3IONS-SCNC: 10 MEQ/L (ref 7–13)
AST SERPL-CCNC: 16 U/L (ref 0–35)
BACTERIA: ABNORMAL /HPF
BASOPHILS ABSOLUTE: 0 K/UL (ref 0–0.2)
BASOPHILS RELATIVE PERCENT: 0.3 %
BILIRUB SERPL-MCNC: 0.3 MG/DL (ref 0–1.2)
BILIRUBIN URINE: NEGATIVE
BLOOD, URINE: ABNORMAL
BUN BLDV-MCNC: 21 MG/DL (ref 8–23)
CALCIUM SERPL-MCNC: 8.9 MG/DL (ref 8.6–10.2)
CHLORIDE BLD-SCNC: 109 MEQ/L (ref 98–107)
CLARITY: ABNORMAL
CO2: 26 MEQ/L (ref 22–29)
COLOR: ABNORMAL
CREAT SERPL-MCNC: 0.7 MG/DL (ref 0.5–0.9)
EOSINOPHILS ABSOLUTE: 0.1 K/UL (ref 0–0.7)
EOSINOPHILS RELATIVE PERCENT: 2.3 %
EPITHELIAL CELLS, UA: ABNORMAL /HPF
GFR AFRICAN AMERICAN: >60
GFR NON-AFRICAN AMERICAN: >60
GLOBULIN: 2.5 G/DL (ref 2.3–3.5)
GLUCOSE BLD-MCNC: 102 MG/DL (ref 74–109)
GLUCOSE URINE: NEGATIVE MG/DL
HCT VFR BLD CALC: 40.1 % (ref 37–47)
HEMOGLOBIN: 13.6 G/DL (ref 12–16)
KETONES, URINE: NEGATIVE MG/DL
LACTIC ACID: 1.8 MMOL/L (ref 0.5–2.2)
LEUKOCYTE ESTERASE, URINE: ABNORMAL
LIPASE: 19 U/L (ref 13–60)
LYMPHOCYTES ABSOLUTE: 1.8 K/UL (ref 1–4.8)
LYMPHOCYTES RELATIVE PERCENT: 28.6 %
MCH RBC QN AUTO: 31.6 PG (ref 27–31.3)
MCHC RBC AUTO-ENTMCNC: 33.8 % (ref 33–37)
MCV RBC AUTO: 93.5 FL (ref 82–100)
MONOCYTES ABSOLUTE: 0.4 K/UL (ref 0.2–0.8)
MONOCYTES RELATIVE PERCENT: 6.6 %
NEUTROPHILS ABSOLUTE: 3.9 K/UL (ref 1.4–6.5)
NEUTROPHILS RELATIVE PERCENT: 62.2 %
NITRITE, URINE: NEGATIVE
PDW BLD-RTO: 13.6 % (ref 11.5–14.5)
PH UA: 5 (ref 5–9)
PLATELET # BLD: 172 K/UL (ref 130–400)
POTASSIUM SERPL-SCNC: 4 MEQ/L (ref 3.5–5.1)
PROTEIN UA: 30 MG/DL
RBC # BLD: 4.29 M/UL (ref 4.2–5.4)
RBC UA: >100 /HPF (ref 0–2)
SODIUM BLD-SCNC: 145 MEQ/L (ref 132–144)
SPECIFIC GRAVITY UA: 1.02 (ref 1–1.03)
TOTAL PROTEIN: 6.6 G/DL (ref 6.4–8.1)
URINE REFLEX TO CULTURE: YES
UROBILINOGEN, URINE: 0.2 E.U./DL
WBC # BLD: 6.3 K/UL (ref 4.8–10.8)
WBC UA: ABNORMAL /HPF (ref 0–5)

## 2018-08-18 PROCEDURE — 99284 EMERGENCY DEPT VISIT MOD MDM: CPT

## 2018-08-18 PROCEDURE — 74176 CT ABD & PELVIS W/O CONTRAST: CPT

## 2018-08-18 PROCEDURE — 96375 TX/PRO/DX INJ NEW DRUG ADDON: CPT

## 2018-08-18 PROCEDURE — 2580000003 HC RX 258: Performed by: PHYSICIAN ASSISTANT

## 2018-08-18 PROCEDURE — 83605 ASSAY OF LACTIC ACID: CPT

## 2018-08-18 PROCEDURE — 80053 COMPREHEN METABOLIC PANEL: CPT

## 2018-08-18 PROCEDURE — 85025 COMPLETE CBC W/AUTO DIFF WBC: CPT

## 2018-08-18 PROCEDURE — 6360000002 HC RX W HCPCS: Performed by: PHYSICIAN ASSISTANT

## 2018-08-18 PROCEDURE — 36415 COLL VENOUS BLD VENIPUNCTURE: CPT

## 2018-08-18 PROCEDURE — 87086 URINE CULTURE/COLONY COUNT: CPT

## 2018-08-18 PROCEDURE — 81001 URINALYSIS AUTO W/SCOPE: CPT

## 2018-08-18 PROCEDURE — 96374 THER/PROPH/DIAG INJ IV PUSH: CPT

## 2018-08-18 PROCEDURE — 83690 ASSAY OF LIPASE: CPT

## 2018-08-18 RX ORDER — OXYCODONE HYDROCHLORIDE AND ACETAMINOPHEN 5; 325 MG/1; MG/1
1 TABLET ORAL EVERY 6 HOURS PRN
Qty: 12 TABLET | Refills: 0 | Status: SHIPPED | OUTPATIENT
Start: 2018-08-18 | End: 2018-08-21

## 2018-08-18 RX ORDER — 0.9 % SODIUM CHLORIDE 0.9 %
1000 INTRAVENOUS SOLUTION INTRAVENOUS ONCE
Status: COMPLETED | OUTPATIENT
Start: 2018-08-18 | End: 2018-08-18

## 2018-08-18 RX ORDER — ONDANSETRON 4 MG/1
4 TABLET, FILM COATED ORAL EVERY 8 HOURS PRN
Qty: 20 TABLET | Refills: 0 | Status: SHIPPED | OUTPATIENT
Start: 2018-08-18 | End: 2018-09-12 | Stop reason: ALTCHOICE

## 2018-08-18 RX ORDER — MORPHINE SULFATE 4 MG/ML
4 INJECTION, SOLUTION INTRAMUSCULAR; INTRAVENOUS ONCE
Status: COMPLETED | OUTPATIENT
Start: 2018-08-18 | End: 2018-08-18

## 2018-08-18 RX ORDER — KETOROLAC TROMETHAMINE 10 MG/1
10 TABLET, FILM COATED ORAL EVERY 6 HOURS PRN
Qty: 20 TABLET | Refills: 0 | Status: SHIPPED | OUTPATIENT
Start: 2018-08-18

## 2018-08-18 RX ORDER — ONDANSETRON 2 MG/ML
4 INJECTION INTRAMUSCULAR; INTRAVENOUS ONCE
Status: COMPLETED | OUTPATIENT
Start: 2018-08-18 | End: 2018-08-18

## 2018-08-18 RX ORDER — KETOROLAC TROMETHAMINE 30 MG/ML
30 INJECTION, SOLUTION INTRAMUSCULAR; INTRAVENOUS ONCE
Status: COMPLETED | OUTPATIENT
Start: 2018-08-18 | End: 2018-08-18

## 2018-08-18 RX ORDER — TAMSULOSIN HYDROCHLORIDE 0.4 MG/1
0.4 CAPSULE ORAL DAILY
Qty: 5 CAPSULE | Refills: 0 | Status: SHIPPED | OUTPATIENT
Start: 2018-08-18 | End: 2018-09-12 | Stop reason: ALTCHOICE

## 2018-08-18 RX ADMIN — SODIUM CHLORIDE 1000 ML: 9 INJECTION, SOLUTION INTRAVENOUS at 13:23

## 2018-08-18 RX ADMIN — MORPHINE SULFATE 4 MG: 4 INJECTION, SOLUTION INTRAMUSCULAR; INTRAVENOUS at 15:47

## 2018-08-18 RX ADMIN — KETOROLAC TROMETHAMINE 30 MG: 30 INJECTION, SOLUTION INTRAMUSCULAR at 13:23

## 2018-08-18 RX ADMIN — ONDANSETRON HYDROCHLORIDE 4 MG: 2 INJECTION, SOLUTION INTRAMUSCULAR; INTRAVENOUS at 13:23

## 2018-08-18 ASSESSMENT — PAIN DESCRIPTION - ORIENTATION: ORIENTATION: RIGHT

## 2018-08-18 ASSESSMENT — ENCOUNTER SYMPTOMS
VOMITING: 0
COLOR CHANGE: 0
EYE DISCHARGE: 0
ABDOMINAL DISTENTION: 0
ABDOMINAL PAIN: 1
RHINORRHEA: 0
COUGH: 0
DIARRHEA: 0
CHOKING: 0
BACK PAIN: 0
SORE THROAT: 0
CONSTIPATION: 0
SHORTNESS OF BREATH: 0
NAUSEA: 1

## 2018-08-18 ASSESSMENT — PAIN DESCRIPTION - DESCRIPTORS: DESCRIPTORS: CONSTANT

## 2018-08-18 ASSESSMENT — PAIN SCALES - GENERAL
PAINLEVEL_OUTOF10: 6
PAINLEVEL_OUTOF10: 9
PAINLEVEL_OUTOF10: 10

## 2018-08-18 ASSESSMENT — PAIN DESCRIPTION - PAIN TYPE: TYPE: ACUTE PAIN

## 2018-08-18 ASSESSMENT — PAIN DESCRIPTION - LOCATION: LOCATION: ABDOMEN

## 2018-08-18 NOTE — ED NOTES
Dc instructions to pt with 4 prescriptions states understands. Pt dc'd awake alert oriented x 3 ambulatotory status improved. Pt said said pain is decreasing.        Irene Courser, JON  08/18/18 7928

## 2018-08-18 NOTE — ED NOTES
Pt said her pain right lower quadrant has come back, medicated  As ordered     Wallace Oliveira, RN  08/18/18 4313

## 2018-08-18 NOTE — ED PROVIDER NOTES
URETER PELVIC JUNCTION. 3. MULTIPLE AREAS LOW-ATTENUATION TOO NUMEROUS TO COUNT IN THE LIVER. PROBABLE MULTIPLE HEPATIC CYSTS. GROSSLY UNCHANGED         All CT scans at this facility use dose modulation, iterative reconstruction, and/or weight based dosing when appropriate to reduce radiation dose to as low as reasonably achievable. ED BEDSIDE ULTRASOUND:   Performed by ED Physician - none    LABS:  Labs Reviewed   COMPREHENSIVE METABOLIC PANEL - Abnormal; Notable for the following:        Result Value    Sodium 145 (*)     Chloride 109 (*)     All other components within normal limits   CBC WITH AUTO DIFFERENTIAL - Abnormal; Notable for the following:     MCH 31.6 (*)     All other components within normal limits   URINE RT REFLEX TO CULTURE - Abnormal; Notable for the following:     Color, UA STEPHANIE (*)     Clarity, UA CLOUDY (*)     Blood, Urine LARGE (*)     Protein, UA 30 (*)     Leukocyte Esterase, Urine SMALL (*)     All other components within normal limits   MICROSCOPIC URINALYSIS - Abnormal; Notable for the following:     RBC, UA >100 (*)     All other components within normal limits   URINE CULTURE   LIPASE   LACTIC ACID, PLASMA       All other labs were within normal range or not returned as of this dictation. EMERGENCY DEPARTMENT COURSE and DIFFERENTIAL DIAGNOSIS/MDM:   Vitals:    Vitals:    08/18/18 1243 08/18/18 1429   BP: (!) 152/94 (!) 144/87   Pulse: 97 98   Resp: 16 16   Temp: 97.5 °F (36.4 °C)    TempSrc: Oral    SpO2: 100% 100%   Weight: 194 lb (88 kg)    Height: 5' 3\" (1.6 m)             MDM  Number of Diagnoses or Management Options  Nephrolithiasis:   Diagnosis management comments: Patient states after she was medicated with Toradol she is feeling much better she is requesting similar pain medication at home to control her she should they recur.   He does have a 5 mm stone in the right side UPJ with mild hydronephrosis she was advised to call Dr. Reinier Latham for follow-up for

## 2018-08-20 ENCOUNTER — TELEPHONE (OUTPATIENT)
Dept: UROLOGY | Age: 63
End: 2018-08-20

## 2018-08-20 DIAGNOSIS — N20.0 KIDNEY STONE: Primary | ICD-10-CM

## 2018-08-20 LAB — URINE CULTURE, ROUTINE: NORMAL

## 2018-08-23 ENCOUNTER — OFFICE VISIT (OUTPATIENT)
Dept: UROLOGY | Age: 63
End: 2018-08-23
Payer: COMMERCIAL

## 2018-08-23 ENCOUNTER — HOSPITAL ENCOUNTER (OUTPATIENT)
Dept: GENERAL RADIOLOGY | Age: 63
Discharge: HOME | End: 2018-08-25
Payer: COMMERCIAL

## 2018-08-23 VITALS
HEIGHT: 63 IN | BODY MASS INDEX: 33.66 KG/M2 | SYSTOLIC BLOOD PRESSURE: 136 MMHG | WEIGHT: 190 LBS | HEART RATE: 79 BPM | DIASTOLIC BLOOD PRESSURE: 90 MMHG

## 2018-08-23 DIAGNOSIS — N20.1 URETERAL CALCULUS, RIGHT: Primary | ICD-10-CM

## 2018-08-23 DIAGNOSIS — N20.0 KIDNEY STONE: ICD-10-CM

## 2018-08-23 PROCEDURE — G8599 NO ASA/ANTIPLAT THER USE RNG: HCPCS | Performed by: UROLOGY

## 2018-08-23 PROCEDURE — G8417 CALC BMI ABV UP PARAM F/U: HCPCS | Performed by: UROLOGY

## 2018-08-23 PROCEDURE — 3017F COLORECTAL CA SCREEN DOC REV: CPT | Performed by: UROLOGY

## 2018-08-23 PROCEDURE — 1036F TOBACCO NON-USER: CPT | Performed by: UROLOGY

## 2018-08-23 PROCEDURE — 74018 RADEX ABDOMEN 1 VIEW: CPT

## 2018-08-23 PROCEDURE — 99214 OFFICE O/P EST MOD 30 MIN: CPT | Performed by: UROLOGY

## 2018-08-23 PROCEDURE — G8427 DOCREV CUR MEDS BY ELIG CLIN: HCPCS | Performed by: UROLOGY

## 2018-08-23 RX ORDER — TAMSULOSIN HYDROCHLORIDE 0.4 MG/1
0.4 CAPSULE ORAL DAILY
Qty: 30 CAPSULE | Refills: 0 | Status: SHIPPED | OUTPATIENT
Start: 2018-08-23

## 2018-08-23 RX ORDER — POTASSIUM CITRATE 10 MEQ/1
10 TABLET, EXTENDED RELEASE ORAL
Qty: 270 TABLET | Refills: 3 | Status: SHIPPED | OUTPATIENT
Start: 2018-08-23

## 2018-08-23 RX ORDER — OXYCODONE HYDROCHLORIDE AND ACETAMINOPHEN 5; 325 MG/1; MG/1
TABLET ORAL
Refills: 0 | COMMUNITY
Start: 2018-08-18 | End: 2019-01-08

## 2018-08-23 ASSESSMENT — ENCOUNTER SYMPTOMS
ABDOMINAL PAIN: 0
SHORTNESS OF BREATH: 0
ABDOMINAL DISTENTION: 0

## 2018-08-23 NOTE — PROGRESS NOTES
Prescriptions   Medication Sig Dispense Refill    oxyCODONE-acetaminophen (PERCOCET) 5-325 MG per tablet TAKE 1 TABLET BY MOUTH EVERY 6 HOURS AS NEEDED FOR PAIN FOR UP TO 3 DAYS TAKE LOWEST DOSE POSSIBLE TO MANAGE PAIN. USE FOR PAIN NOT RESOLVED  0    tamsulosin (FLOMAX) 0.4 MG capsule Take 1 capsule by mouth daily 30 capsule 0    potassium citrate (UROCIT-K 10) 10 MEQ (1080 MG) extended release tablet Take 1 tablet by mouth 3 times daily (with meals) 270 tablet 3    ketorolac (TORADOL) 10 MG tablet Take 1 tablet by mouth every 6 hours as needed for Pain 20 tablet 0    ondansetron (ZOFRAN) 4 MG tablet Take 1 tablet by mouth every 8 hours as needed for Nausea 20 tablet 0    tamsulosin (FLOMAX) 0.4 MG capsule Take 1 capsule by mouth daily for 5 doses 5 capsule 0    metoprolol succinate (TOPROL XL) 25 MG extended release tablet take 1 tablet daily  3    atorvastatin (LIPITOR) 80 MG tablet TAKE 1 TABLET BY MOUTH DAILY IN THE EVENING  12    lisinopril (PRINIVIL;ZESTRIL) 5 MG tablet take 1/2 tablet by mouth daily  12    nitroGLYCERIN (NITROSTAT) 0.4 MG SL tablet DISSOLVE 1 (ONE) TABLET UNDER THE TONGUE AS NEEDED FOR CHEST PAIN. MAY REPEAT EVERY 5 MINUTES IF NEEDED; MAX OF 3 DOSES. IF NO RELIEF, RAFI  3    BRILINTA 90 MG TABS tablet        No current facility-administered medications for this visit. Patient has no known allergies. reviewed      Review of Systems   Constitutional: Negative for fever. Respiratory: Negative for shortness of breath. Cardiovascular: Negative for chest pain. Gastrointestinal: Negative for abdominal distention and abdominal pain. Genitourinary: Negative for dysuria and urgency. Objective:   Physical Exam   Constitutional: She appears well-developed and well-nourished. Abdominal: Soft. She exhibits no distension. There is no tenderness. There is no rebound, no guarding and no CVA tenderness. Neurological: She is alert.    Psychiatric: She has a normal mood and affect. Her behavior is normal.     8/20/2018  8:32 AM - Brock, Chpo Incoming Lab Results From Soft     Component Results     Component Collected Lab   Urine Culture, Routine 08/18/2018  1:15 PM [address] [facility] [facility]   <50,000 CFU/ml of mixed madiha   Multiple organisms isolated, no predominance. Culture   indicates probable contamination. Please review colony count   and clinical indications to determine if a repeat culture is   necessary. No further workup to be done. Testing Performed By     Ian Lyon Name Director Address Valid Date Range   [address] [facility]        8/18/2018  1:44 PM - Brock, Chpo Incoming Lab Results From Soft     Component Results     Component Value Ref Range & Units Status Collected Lab   WBC 6.3  4.8 - 10.8 K/uL Final 08/18/2018  1:15 PM [address] [facility] [facility]   RBC 4.29  4. 20 - 5.40 M/uL Final 08/18/2018  1:15 PM [address] [facility]   Hemoglobin 13.6  12.0 - 16.0 g/dL Final 08/18/2018  1:15 PM [address] [facility]   Hematocrit 40.1  37.0 - 47.0 % Final 08/18/2018  1:15 PM  - [facility]   MCV 93.5  82.0 - 100.0 fL Final 08/18/2018  1:15 PM  - [facility]   MCH 31.6   27.0 - 31.3 pg Final 08/18/2018  1:15 PM [address] [facility]   MCHC 33.8  33.0 - 37.0 % Final 08/18/2018  1:15 PM [address] [facility]   RDW 13.6  11.5 - 14.5 % Final 08/18/2018  1:15 PM [address] [facility]   Platelets 879          8/18/2018  2:10 PM - Brock, Chpo Incoming Lab Results From Soft     Component Results     Component Value Ref Range & Units Status Collected Lab   Sodium 145   132 - 144 mEq/L Final 08/18/2018  1:15 PM [address] [facility]   Potassium 4.0  3.5 - 5.1 mEq/L Final 08/18/2018  1:15 PM [address] [facility]   Chloride 109   98 - 107 mEq/L Final 08/18/2018  1:15 PM  - [facility]   CO2 26  22 - 29 mEq/L Final 08/18/2018  1:15 PM  [facility]   Anion Gap 10  7 - 13 mEq/L Final 08/18/2018

## 2018-09-11 ENCOUNTER — APPOINTMENT (OUTPATIENT)
Dept: GENERAL RADIOLOGY | Age: 63
End: 2018-09-11
Payer: COMMERCIAL

## 2018-09-11 ENCOUNTER — HOSPITAL ENCOUNTER (OUTPATIENT)
Dept: GENERAL RADIOLOGY | Age: 63
End: 2018-09-11
Payer: COMMERCIAL

## 2018-09-11 ENCOUNTER — HOSPITAL ENCOUNTER (OUTPATIENT)
Dept: CT IMAGING | Age: 63
Discharge: HOME | End: 2018-09-13
Payer: COMMERCIAL

## 2018-09-11 DIAGNOSIS — N20.1 URETERAL CALCULUS, RIGHT: ICD-10-CM

## 2018-09-11 PROCEDURE — 74176 CT ABD & PELVIS W/O CONTRAST: CPT

## 2018-09-12 ENCOUNTER — HOSPITAL ENCOUNTER (OUTPATIENT)
Dept: GENERAL RADIOLOGY | Age: 63
Discharge: HOME | End: 2018-09-14
Payer: COMMERCIAL

## 2018-09-12 ENCOUNTER — OFFICE VISIT (OUTPATIENT)
Dept: UROLOGY | Age: 63
End: 2018-09-12
Payer: COMMERCIAL

## 2018-09-12 VITALS
DIASTOLIC BLOOD PRESSURE: 88 MMHG | WEIGHT: 190 LBS | HEART RATE: 70 BPM | HEIGHT: 63 IN | BODY MASS INDEX: 33.66 KG/M2 | SYSTOLIC BLOOD PRESSURE: 136 MMHG

## 2018-09-12 DIAGNOSIS — N20.0 KIDNEY STONES: Primary | ICD-10-CM

## 2018-09-12 DIAGNOSIS — N20.1 CALCULUS OF URETER: ICD-10-CM

## 2018-09-12 PROCEDURE — 74018 RADEX ABDOMEN 1 VIEW: CPT

## 2018-09-12 PROCEDURE — 1036F TOBACCO NON-USER: CPT | Performed by: UROLOGY

## 2018-09-12 PROCEDURE — G8427 DOCREV CUR MEDS BY ELIG CLIN: HCPCS | Performed by: UROLOGY

## 2018-09-12 PROCEDURE — G8599 NO ASA/ANTIPLAT THER USE RNG: HCPCS | Performed by: UROLOGY

## 2018-09-12 PROCEDURE — G8417 CALC BMI ABV UP PARAM F/U: HCPCS | Performed by: UROLOGY

## 2018-09-12 PROCEDURE — 3017F COLORECTAL CA SCREEN DOC REV: CPT | Performed by: UROLOGY

## 2018-09-12 PROCEDURE — 99213 OFFICE O/P EST LOW 20 MIN: CPT | Performed by: UROLOGY

## 2018-09-12 RX ORDER — LOSARTAN POTASSIUM 25 MG/1
TABLET ORAL
Refills: 3 | COMMUNITY
Start: 2018-09-08 | End: 2018-12-17 | Stop reason: DRUGHIGH

## 2018-09-12 ASSESSMENT — ENCOUNTER SYMPTOMS
ABDOMINAL DISTENTION: 0
ABDOMINAL PAIN: 0

## 2018-09-12 NOTE — PROGRESS NOTES
Comparison: August 18, 2018 and November 23, 2017.       Findings:       The visualized basal lungs show no focal parenchymal abnormalities. A trace to small pericardial effusion has developed since the prior examination.       The liver again shows multiple areas low-attenuation too numerous to count scattered throughout the left and right lobes of the liver. Grossly unchanged. Probable multiple hepatic cysts. No intrahepatic biliary dilatation. The, gallbladder, spleen,    pancreas, adrenals,  are unremarkable.       The right kidney shows multiple small punctate calculi in the range of 2 3 mm. The previously seen calculi at the right ureteropelvic junction is no longer visualized. There is a question of a less than 2 mm calculi in the distal right ureter. . No bladder calculi. Again note is made of a small exophytic mass at the interpolar region. Too small to characterize. Unchanged. There is a left-sided nephrolithiasis. Again noted. No bladder calculi       Large and small bowel show no sign of obstruction.  The appendix is visualized.  No periappendiceal stranding.  No diverticulitis.       Small hiatal hernia       No free air.  No free fluid.  The visualized abdominal aorta is of normal size and caliber.  No significant retroperitoneal adenopathy.       Visualized osseous structures are grossly unremarkable. Incidentally again note is made of Schmorl's node at L3.               Impression   1. BILATERAL NEPHROLITHIASIS.       2. A TRACE TO SMALL PERICARDIAL EFFUSION HAS DEVELOPED SINCE PRIOR EXAMINATION. CORRELATE CLINICALLY.       3. QUESTION OF A LESS THAN 2 MM CALCULI IN THE DISTAL RIGHT URETER. THE PREVIOUSLY SEEN RIGHT-SIDED HYDRONEPHROSIS HAS RESOLVED. CORRELATE CLINICALLY        3. MULTIPLE AREAS LOW-ATTENUATION TOO NUMEROUS TO COUNT IN THE LIVER. PROBABLE MULTIPLE HEPATIC CYSTS.  GROSSLY UNCHANGED           All CT scans at this facility use dose modulation, iterative reconstruction, and/or weight based dosing when appropriate to reduce radiation dose to as low as reasonably achievable. Assessment: This is a 60 yo female with HTN, Depression, OA, CAD/MI/Stents (3/18) on Brilinta and has no further hydronephrosis by CT and no pain and has likely passed the 4-5 mm Rt ureteral stone. She has resumed Urocit K TID. Will follow-up next year for U/A or sooner if gets recurrent symptoms. She need Brilinta for 6-12 mo per cardiology. I discussed the cardiac effusion noted and she will discuss with her cardiologist.       Plan:      1.  Will continue with Urocit K TID  2. F/U 1 yr for Ghada Laird MD

## 2018-12-17 ENCOUNTER — OFFICE VISIT (OUTPATIENT)
Dept: FAMILY MEDICINE CLINIC | Age: 63
End: 2018-12-17
Payer: COMMERCIAL

## 2018-12-17 VITALS
HEART RATE: 100 BPM | TEMPERATURE: 98.6 F | BODY MASS INDEX: 36.32 KG/M2 | DIASTOLIC BLOOD PRESSURE: 88 MMHG | HEIGHT: 63 IN | SYSTOLIC BLOOD PRESSURE: 146 MMHG | RESPIRATION RATE: 16 BRPM | WEIGHT: 205 LBS

## 2018-12-17 DIAGNOSIS — I10 ESSENTIAL HYPERTENSION: ICD-10-CM

## 2018-12-17 DIAGNOSIS — R45.89 DEPRESSED MOOD: ICD-10-CM

## 2018-12-17 DIAGNOSIS — F41.9 ANXIETY: ICD-10-CM

## 2018-12-17 DIAGNOSIS — Z12.31 ENCOUNTER FOR SCREENING MAMMOGRAM FOR BREAST CANCER: ICD-10-CM

## 2018-12-17 DIAGNOSIS — E78.2 MIXED HYPERLIPIDEMIA: Primary | ICD-10-CM

## 2018-12-17 DIAGNOSIS — Z23 NEED FOR INFLUENZA VACCINATION: ICD-10-CM

## 2018-12-17 PROCEDURE — 3017F COLORECTAL CA SCREEN DOC REV: CPT | Performed by: FAMILY MEDICINE

## 2018-12-17 PROCEDURE — G8482 FLU IMMUNIZE ORDER/ADMIN: HCPCS | Performed by: FAMILY MEDICINE

## 2018-12-17 PROCEDURE — 90688 IIV4 VACCINE SPLT 0.5 ML IM: CPT | Performed by: FAMILY MEDICINE

## 2018-12-17 PROCEDURE — G8427 DOCREV CUR MEDS BY ELIG CLIN: HCPCS | Performed by: FAMILY MEDICINE

## 2018-12-17 PROCEDURE — 1036F TOBACCO NON-USER: CPT | Performed by: FAMILY MEDICINE

## 2018-12-17 PROCEDURE — G8417 CALC BMI ABV UP PARAM F/U: HCPCS | Performed by: FAMILY MEDICINE

## 2018-12-17 PROCEDURE — G8599 NO ASA/ANTIPLAT THER USE RNG: HCPCS | Performed by: FAMILY MEDICINE

## 2018-12-17 PROCEDURE — 99214 OFFICE O/P EST MOD 30 MIN: CPT | Performed by: FAMILY MEDICINE

## 2018-12-17 PROCEDURE — 90471 IMMUNIZATION ADMIN: CPT | Performed by: FAMILY MEDICINE

## 2018-12-17 RX ORDER — LOSARTAN POTASSIUM 50 MG/1
TABLET ORAL
Refills: 3 | COMMUNITY
Start: 2018-11-21

## 2018-12-17 NOTE — PROGRESS NOTES
Subjective  Nena Power, 61 y.o. female presents today with:  Chief Complaint   Patient presents with    Hyperlipidemia    Depression    Anxiety       [address]      Past Medical History:   Diagnosis Date    Anxiety     Depressed mood     HPV test positive     STEMI (ST elevation myocardial infarction) ([facility] [address] 03/2018     Past Surgical History:   Procedure Laterality Date    CARDIAC CATHETERIZATION  03/02/2018    LADx2 RCA x1 OM x2     COLONOSCOPY  4/25/16        COLPOSCOPY  2011?  TIBIA FRACTURE SURGERY  2007     Social History     Social History    Marital status:      Spouse name: N/A    Number of children: N/A    Years of education: N/A     Occupational History    Not on file. Social History Main Topics    Smoking status: Never Smoker    Smokeless tobacco: Never Used    Alcohol use 0.0 oz/week      Comment: occ    Drug use: No    Sexual activity: Not on file     Other Topics Concern    Not on file     Social History Narrative    No narrative on file     Family History   Problem Relation Age of Onset    Heart Disease Mother     Kidney Disease Mother     Heart Disease Father      No Known Allergies  Current Outpatient Prescriptions   Medication Sig Dispense Refill    oxyCODONE-acetaminophen (PERCOCET) 5-325 MG per tablet TAKE 1 TABLET BY MOUTH EVERY 6 HOURS AS NEEDED FOR PAIN FOR UP TO 3 DAYS TAKE LOWEST DOSE POSSIBLE TO MANAGE PAIN.  USE FOR PAIN NOT RESOLVED  0    tamsulosin (FLOMAX) 0.4 MG capsule Take 1 capsule by mouth daily 30 capsule 0    potassium citrate (UROCIT-K 10) 10 MEQ (1080 MG) extended release tablet Take 1 tablet by mouth 3 times daily (with meals) 270 tablet 3    ketorolac (TORADOL) 10 MG tablet Take 1 tablet by mouth every 6 hours as needed for Pain 20 tablet 0    metoprolol succinate (TOPROL XL) 25 MG extended release tablet take 1 tablet daily  3    atorvastatin (LIPITOR) 80 MG tablet TAKE 1 TABLET BY MOUTH DAILY IN THE EVENING  12    04/25/2021    Cervical cancer screen  09/25/2022    Lipid screen  11/14/2023       No results found for this visit on 12/17/18. Objective      Wt Readings from Last 3 Encounters:   12/17/18 205 lb (93 kg)   09/12/18 190 lb (86.2 kg)   08/23/18 190 lb (86.2 kg)     Temp Readings from Last 3 Encounters:   12/17/18 98.6 °F (37 °C) (Oral)   08/18/18 97.5 °F (36.4 °C) (Oral)   06/18/18 97.7 °F (36.5 °C) (Oral)     BP Readings from Last 3 Encounters:   12/17/18 (!) 146/88   09/12/18 136/88   08/23/18 (!) 136/90     Pulse Readings from Last 3 Encounters:   12/17/18 100   09/12/18 70   08/23/18 79       PHYSICAL EXAMINATION:        GENERAL:    The patient appears well nourished and well-developed,     Normal affect. Not appearing significantly anxious or depressed. No acute respiratory distress. Alert and oriented times 3. Skin:     No skin rashes. No concerning moles observed. Gait:    Normal gait. No ataxia. HEENT:  Normocephalic, atraumatic. Throat:  Pharynx is clear, no erythema/ edema or exudates   Ears:    TMs normal bilaterally. Canals and ears normal   Eyes:  Extraocular eye motions intact and pain free. Pupils reactive/equal    Sclerae and conjunctivae clear    NECK: No masses or adenopathy palpable. No carotid bruits heard. No asymmetry visible. No thyromegaly. RESPIRATORY:   Clear/ Equal breath sounds /No acute respiratory distress. No wheezes,rales, or rhonchi. No percussive abnormalities    HEART: Regular rhythm without murmur, rub or gallop. ABDOMEN:  Soft, non tender. No masses, guarding or rebound. Normo active bowel sounds. EXTREMITIES:  No edema in any extremity. No cyanosis or clubbing. 2+ dorsalis pedis pulses bilaterally          Assessment & Plan    Diagnosis Orders   1. Mixed hyperlipidemia     2. Depressed mood     3. Anxiety     4.  Encounter for screening mammogram for breast cancer  KARLEE DIGITAL SCREEN W CAD BILATERAL

## 2019-01-08 ENCOUNTER — OFFICE VISIT (OUTPATIENT)
Dept: FAMILY MEDICINE CLINIC | Age: 64
End: 2019-01-08
Payer: COMMERCIAL

## 2019-01-08 VITALS
RESPIRATION RATE: 14 BRPM | BODY MASS INDEX: 36.68 KG/M2 | HEART RATE: 85 BPM | TEMPERATURE: 99 F | OXYGEN SATURATION: 98 % | WEIGHT: 207 LBS | HEIGHT: 63 IN | DIASTOLIC BLOOD PRESSURE: 80 MMHG | SYSTOLIC BLOOD PRESSURE: 116 MMHG

## 2019-01-08 DIAGNOSIS — R31.0 GROSS HEMATURIA: ICD-10-CM

## 2019-01-08 DIAGNOSIS — R30.0 DYSURIA: Primary | ICD-10-CM

## 2019-01-08 DIAGNOSIS — Z87.442 HISTORY OF NEPHROLITHIASIS: ICD-10-CM

## 2019-01-08 DIAGNOSIS — R10.9 FLANK PAIN: ICD-10-CM

## 2019-01-08 DIAGNOSIS — R30.0 DYSURIA: ICD-10-CM

## 2019-01-08 LAB
BILIRUBIN, POC: NEGATIVE
BLOOD URINE, POC: ABNORMAL
CLARITY, POC: ABNORMAL
COLOR, POC: ABNORMAL
GLUCOSE URINE, POC: NEGATIVE
KETONES, POC: NEGATIVE
LEUKOCYTE EST, POC: ABNORMAL
NITRITE, POC: NEGATIVE
PH, POC: 5.5
PROTEIN, POC: ABNORMAL
SPECIFIC GRAVITY, POC: 1.02
UROBILINOGEN, POC: NEGATIVE

## 2019-01-08 PROCEDURE — 81003 URINALYSIS AUTO W/O SCOPE: CPT | Performed by: PHYSICIAN ASSISTANT

## 2019-01-08 PROCEDURE — G8482 FLU IMMUNIZE ORDER/ADMIN: HCPCS | Performed by: PHYSICIAN ASSISTANT

## 2019-01-08 PROCEDURE — 99213 OFFICE O/P EST LOW 20 MIN: CPT | Performed by: PHYSICIAN ASSISTANT

## 2019-01-08 PROCEDURE — G8417 CALC BMI ABV UP PARAM F/U: HCPCS | Performed by: PHYSICIAN ASSISTANT

## 2019-01-08 PROCEDURE — 3017F COLORECTAL CA SCREEN DOC REV: CPT | Performed by: PHYSICIAN ASSISTANT

## 2019-01-08 PROCEDURE — G8428 CUR MEDS NOT DOCUMENT: HCPCS | Performed by: PHYSICIAN ASSISTANT

## 2019-01-08 PROCEDURE — 1036F TOBACCO NON-USER: CPT | Performed by: PHYSICIAN ASSISTANT

## 2019-01-08 RX ORDER — ACETAMINOPHEN AND CODEINE PHOSPHATE 300; 30 MG/1; MG/1
1 TABLET ORAL EVERY 4 HOURS PRN
Qty: 30 TABLET | Refills: 0 | Status: SHIPPED | OUTPATIENT
Start: 2019-01-08 | End: 2019-01-13

## 2019-01-08 RX ORDER — CIPROFLOXACIN 500 MG/1
500 TABLET, FILM COATED ORAL 2 TIMES DAILY
Qty: 20 TABLET | Refills: 0 | Status: SHIPPED | OUTPATIENT
Start: 2019-01-08 | End: 2019-01-18

## 2019-01-08 ASSESSMENT — ENCOUNTER SYMPTOMS
CHEST TIGHTNESS: 0
NAUSEA: 0
ABDOMINAL PAIN: 1

## 2019-01-10 LAB — URINE CULTURE, ROUTINE: NORMAL

## 2019-03-19 ENCOUNTER — TELEPHONE (OUTPATIENT)
Dept: FAMILY MEDICINE CLINIC | Age: 64
End: 2019-03-19

## 2019-03-26 ENCOUNTER — TELEPHONE (OUTPATIENT)
Dept: FAMILY MEDICINE CLINIC | Age: 64
End: 2019-03-26

## 2019-04-04 ENCOUNTER — TELEPHONE (OUTPATIENT)
Dept: ADMINISTRATIVE | Age: 64
End: 2019-04-04

## 2023-03-31 PROBLEM — D06.9 HIGH GRADE SQUAMOUS INTRAEPITHELIAL LESION (HGSIL), GRADE 3 CIN, ON BIOPSY OF CERVIX: Status: ACTIVE | Noted: 2023-03-31

## 2023-03-31 PROBLEM — E66.9 OBESITY (BMI 35.0-39.9 WITHOUT COMORBIDITY): Status: ACTIVE | Noted: 2023-03-31

## 2023-03-31 PROBLEM — I25.10 3-VESSEL CAD: Status: ACTIVE | Noted: 2023-03-31

## 2023-03-31 PROBLEM — N20.0 URIC ACID NEPHROLITHIASIS: Status: ACTIVE | Noted: 2023-03-31

## 2023-03-31 PROBLEM — M25.531 RIGHT WRIST PAIN: Status: ACTIVE | Noted: 2023-03-31

## 2023-03-31 PROBLEM — L98.9 SKIN LESION: Status: ACTIVE | Noted: 2023-03-31

## 2023-03-31 PROBLEM — S52.509A DISTAL RADIUS FRACTURE: Status: ACTIVE | Noted: 2023-03-31

## 2023-03-31 PROBLEM — R10.9 LEFT FLANK PAIN: Status: ACTIVE | Noted: 2023-03-31

## 2023-03-31 PROBLEM — U07.1 COVID-19 VIRUS INFECTION: Status: ACTIVE | Noted: 2023-03-31

## 2023-03-31 PROBLEM — F43.20 GRIEF REACTION: Status: ACTIVE | Noted: 2023-03-31

## 2023-03-31 PROBLEM — M25.60 JOINT STIFFNESS: Status: ACTIVE | Noted: 2023-03-31

## 2023-03-31 PROBLEM — N39.0 FREQUENT UTI: Status: ACTIVE | Noted: 2023-03-31

## 2023-03-31 PROBLEM — Z86.79 HISTORY OF VENTRICULAR FIBRILLATION: Status: ACTIVE | Noted: 2023-03-31

## 2023-03-31 PROBLEM — R09.89 BRUIT OF RIGHT CAROTID ARTERY: Status: ACTIVE | Noted: 2023-03-31

## 2023-03-31 PROBLEM — R87.810 CERVICAL HIGH RISK HUMAN PAPILLOMAVIRUS (HPV) DNA TEST POSITIVE: Status: ACTIVE | Noted: 2023-03-31

## 2023-03-31 PROBLEM — I25.2 HISTORY OF MI (MYOCARDIAL INFARCTION): Status: ACTIVE | Noted: 2023-03-31

## 2023-03-31 PROBLEM — Z95.1 STATUS POST CORONARY ARTERY BYPASS GRAFT: Status: ACTIVE | Noted: 2023-03-31

## 2023-03-31 PROBLEM — I51.9 HEART DISEASE: Status: ACTIVE | Noted: 2023-03-31

## 2023-03-31 PROBLEM — E78.5 HYPERLIPIDEMIA: Status: ACTIVE | Noted: 2023-03-31

## 2023-03-31 PROBLEM — R31.9 HEMATURIA: Status: ACTIVE | Noted: 2023-03-31

## 2023-03-31 PROBLEM — F43.21 GRIEF REACTION: Status: ACTIVE | Noted: 2023-03-31

## 2023-03-31 RX ORDER — ASPIRIN 81 MG/1
1 TABLET ORAL DAILY
COMMUNITY
Start: 2022-04-14

## 2023-03-31 RX ORDER — METOPROLOL SUCCINATE 25 MG/1
25 TABLET, EXTENDED RELEASE ORAL DAILY
COMMUNITY
Start: 2018-03-19 | End: 2023-05-08 | Stop reason: SDUPTHER

## 2023-03-31 RX ORDER — POTASSIUM CITRATE 15 MEQ/1
15 TABLET, EXTENDED RELEASE ORAL 4 TIMES DAILY
COMMUNITY
Start: 2022-03-10 | End: 2024-05-07 | Stop reason: ALTCHOICE

## 2023-03-31 RX ORDER — NITROGLYCERIN 0.4 MG/1
0.4 TABLET SUBLINGUAL AS NEEDED
COMMUNITY
Start: 2018-03-06 | End: 2023-04-01 | Stop reason: SDUPTHER

## 2023-03-31 RX ORDER — LISINOPRIL 5 MG/1
1 TABLET ORAL DAILY
COMMUNITY
Start: 2018-03-06 | End: 2024-03-13

## 2023-03-31 RX ORDER — ALPRAZOLAM 0.5 MG/1
0.5 TABLET ORAL 2 TIMES DAILY PRN
COMMUNITY
End: 2023-04-01 | Stop reason: SDUPTHER

## 2023-03-31 RX ORDER — ATORVASTATIN CALCIUM 80 MG/1
1 TABLET, FILM COATED ORAL DAILY
COMMUNITY
Start: 2018-03-06 | End: 2024-01-08

## 2023-04-01 ENCOUNTER — OFFICE VISIT (OUTPATIENT)
Dept: PRIMARY CARE | Facility: CLINIC | Age: 68
End: 2023-04-01
Payer: COMMERCIAL

## 2023-04-01 VITALS
HEART RATE: 68 BPM | SYSTOLIC BLOOD PRESSURE: 138 MMHG | TEMPERATURE: 97.1 F | RESPIRATION RATE: 16 BRPM | BODY MASS INDEX: 35.79 KG/M2 | HEIGHT: 63 IN | WEIGHT: 202 LBS | DIASTOLIC BLOOD PRESSURE: 78 MMHG | OXYGEN SATURATION: 99 %

## 2023-04-01 DIAGNOSIS — F51.01 PRIMARY INSOMNIA: Primary | ICD-10-CM

## 2023-04-01 DIAGNOSIS — F32.1 CURRENT MODERATE EPISODE OF MAJOR DEPRESSIVE DISORDER, UNSPECIFIED WHETHER RECURRENT (MULTI): ICD-10-CM

## 2023-04-01 DIAGNOSIS — E78.5 HYPERLIPIDEMIA, UNSPECIFIED HYPERLIPIDEMIA TYPE: ICD-10-CM

## 2023-04-01 DIAGNOSIS — F43.21 GRIEF REACTION: ICD-10-CM

## 2023-04-01 DIAGNOSIS — I25.2 HISTORY OF MI (MYOCARDIAL INFARCTION): ICD-10-CM

## 2023-04-01 DIAGNOSIS — I25.10 3-VESSEL CAD: ICD-10-CM

## 2023-04-01 PROBLEM — U07.1 COVID-19 VIRUS INFECTION: Status: RESOLVED | Noted: 2023-03-31 | Resolved: 2023-04-01

## 2023-04-01 PROBLEM — R87.620 ATYPICAL SQUAMOUS CELLS OF UNDETERMINED SIGNIFICANCE ON CYTOLOGIC SMEAR OF VAGINA (ASC-US): Status: RESOLVED | Noted: 2023-04-01 | Resolved: 2023-04-01

## 2023-04-01 PROBLEM — B97.7 HPV (HUMAN PAPILLOMA VIRUS) INFECTION: Status: RESOLVED | Noted: 2023-04-01 | Resolved: 2023-04-01

## 2023-04-01 PROBLEM — D06.9 CARCINOMA IN SITU OF CERVIX, UNSPECIFIED: Status: RESOLVED | Noted: 2022-04-01 | Resolved: 2023-04-01

## 2023-04-01 PROCEDURE — 1159F MED LIST DOCD IN RCRD: CPT | Performed by: FAMILY MEDICINE

## 2023-04-01 PROCEDURE — 1036F TOBACCO NON-USER: CPT | Performed by: FAMILY MEDICINE

## 2023-04-01 PROCEDURE — 1160F RVW MEDS BY RX/DR IN RCRD: CPT | Performed by: FAMILY MEDICINE

## 2023-04-01 PROCEDURE — 99214 OFFICE O/P EST MOD 30 MIN: CPT | Performed by: FAMILY MEDICINE

## 2023-04-01 RX ORDER — NITROGLYCERIN 0.4 MG/1
0.4 TABLET SUBLINGUAL EVERY 5 MIN PRN
Qty: 25 TABLET | Refills: 0 | Status: SHIPPED | OUTPATIENT
Start: 2023-04-01

## 2023-04-01 RX ORDER — TRAZODONE HYDROCHLORIDE 50 MG/1
50 TABLET ORAL NIGHTLY PRN
Qty: 30 TABLET | Refills: 5 | Status: SHIPPED | OUTPATIENT
Start: 2023-04-01 | End: 2023-09-25

## 2023-04-01 RX ORDER — SERTRALINE HYDROCHLORIDE 50 MG/1
50 TABLET, FILM COATED ORAL DAILY
Qty: 30 TABLET | Refills: 5 | Status: SHIPPED | OUTPATIENT
Start: 2023-04-01 | End: 2023-05-08 | Stop reason: SDUPTHER

## 2023-04-01 RX ORDER — ALPRAZOLAM 0.5 MG/1
0.5 TABLET ORAL 2 TIMES DAILY PRN
Qty: 30 TABLET | Refills: 1 | Status: SHIPPED | OUTPATIENT
Start: 2023-04-01

## 2023-04-01 NOTE — PATIENT INSTRUCTIONS
Alprazolam(xanax) as needed during day 1/2-1 pill  Take trazodone each night for sleep  Start sertraline(zoloft) each AM for mood/depression

## 2023-04-01 NOTE — PROGRESS NOTES
"Subjective   Patient ID: Mikaela Cárdenas is a 67 y.o. female who presents for Anxiety (Follow up with xanax and needs [place] papers filled out).  Covid vax: x 3  CRC: due   Mammogram: 2021  Pap: 2022  Lmp: n/a   dtr -was in [place] tx  Sees CARDIO    HPI  Patient Active Problem List   Diagnosis    3-vessel CAD    Bruit of right carotid artery    Cervical high risk human papillomavirus (HPV) DNA test positive    Distal radius fracture    Heart disease    Frequent UTI    Hematuria    High grade squamous intraepithelial lesion (HGSIL), grade 3 SYED, on biopsy of cervix    Status post coronary artery bypass graft    History of ventricular fibrillation    Hyperlipidemia    Joint stiffness    Left flank pain    Uric acid nephrolithiasis    Obesity (BMI 35.0-39.9 without comorbidity)    Right wrist pain    Skin lesion    History of MI (myocardial infarction)    Grief reaction       Past Surgical History:   Procedure Laterality Date    CERVICAL BIOPSY      CERVICAL CONIZATION   W/ LASER      COLONOSCOPY  2016    due  (-)    CORONARY ANGIOPLASTY  2017    CORONARY STENT PLACEMENT  2017    x 4    EYE MUSCLE SURGERY  1960    x 2    FIBULA FRACTURE SURGERY Left 2012    TIBIA FRACTURE SURGERY Right 2012     +CAD  Review of Systems  This patient has   NO history of recent Covid nor flu symptoms,  NO Fever nor chills,  NO Chest pain, shortness of breath nor paroxysmal nocturnal dyspnea,  NO Nausea, vomiting, nor diarrhea,  NO Hematochezia nor melena,  NO Dysuria, hematuria, nor new incontinence issues  NO new severe headaches nor neurological complaints,  NO new issues with anxiety nor depression nor new psychiatric complaints,  NO suicidal nor homicidal ideations.     OBJECTIVE:  /78   Pulse 68   Temp 36.2 °C (97.1 °F) (Temporal)   Resp 16   Ht 1.6 m (5' 3\")   Wt 91.6 kg (202 lb)   LMP  (LMP Unknown)   SpO2 99%   BMI 35.78 kg/m²      General:  alert, oriented, no acute distress.TEARFUL DISCUSSING DTRs " death    No obvious skin rashes noted.   No gait disturbance noted.    Mood is pleasant, not tearful, no signs of emotional distress.  Not appearing intoxicated or altered.   No voiced delusions,   Normal, appropriate behavior.    HEENT: Normocephalic, atraumatic,   Pupils round, reactive to light  Extraocular motions intact and wnl  Tympanic membranes normal    Neck: no nuchal rigidity  No masses palpable.  No carotid bruits.  No thyromegaly.    Respiratory: Equal breath sounds  No wheezes,    rales,    nor rhonchi  No respiratory distress.    Heart: Regular rate and rhythm, no    murmurs  no rubs/gallops    Abdomen: no masses palpable, no rebound nor guarding, no rebound nor guarding.overwt    Extremities: NO cyanosis noted, no clubbing.   No edema noted.  2+dorsalis pedis pulses.    Normal-not antalgic, steady gait.    No visits with results within 3 Month(s) from this visit.   Latest known visit with results is:   [facility] Encounter on 08/29/2022   Component Date Value Ref Range Status    WBC, Urine 08/29/2022 8 (A)  0 - 5 /HPF Final    RBC, Urine 08/29/2022 7 (A)  0 - 5 /HPF Final    Squamous Epithelial Cells, Urine 08/29/2022 3  /HPF Final    Bacteria, Urine 08/29/2022 1+ (A)  /HPF Final    Mucus, Urine 08/29/2022 1+  /LPF Final    Creatinine 08/29/2022 0.92  0.50 - 1.05 mg/dL Final    GFR Female 08/29/2022 68  >90 mL/min/1.73m2 Final    Comment:  CALCULATIONS OF ESTIMATED GFR ARE PERFORMED   USING THE 2021 CKD-EPI STUDY REFIT EQUATION   WITHOUT THE RACE VARIABLE FOR THE IDMS-TRACEABLE   CREATININE METHODS.    https://jasn.asnjournals.org/content/early/2021/09/22/ASN.3746489566      Color, Urine 08/29/2022 YELLOW  STRAW,YELLOW Final    Appearance, Urine 08/29/2022 HAZY  CLEAR Final    Specific Gravity, Urine 08/29/2022 1.020  1.005 - 1.035 Final    pH, Urine 08/29/2022 5.0  5.0 - 8.0 Final    Protein, Urine 08/29/2022 NEGATIVE  NEGATIVE mg/dL Final    Glucose, Urine 08/29/2022 NEGATIVE  NEGATIVE mg/dL Final     Blood, Urine 08/29/2022 MODERATE(2+) (A)  NEGATIVE Final    Ketones, Urine 08/29/2022 NEGATIVE  NEGATIVE mg/dL Final    Bilirubin, Urine 08/29/2022 NEGATIVE  NEGATIVE Final    Urobilinogen, Urine 08/29/2022 2.0 (H)  0.0 - 1.9 mg/dL Final    Comment: Due to a manufacturing issue, low positive urobilinogen results may be   falsely positive.  Correlate with urine bilirubin and additional   clinical/laboratory findings to assess the risk of hemolytic anemia or liver   disease. If clinically indicated, repeat testing with an alternate method is   available by contacting the laboratory within 24 hours.  .  Some pigments and medications may cause a false positive urobilinogen.      Nitrite, Urine 08/29/2022 NEGATIVE  NEGATIVE Final    Leukocyte Esterase, Urine 08/29/2022 MODERATE (2+) (A)  NEGATIVE Final    Urea Nitrogen 08/29/2022 25 (H)  6 - 23 mg/dL Final        Assessment/Plan     Problem List Items Addressed This Visit          Circulatory    3-vessel CAD    Relevant Medications    nitroglycerin (Nitrostat) 0.4 mg SL tablet       Other    Hyperlipidemia    History of MI (myocardial infarction)    Grief reaction    Relevant Medications    sertraline (Zoloft) 50 mg tablet    ALPRAZolam (Xanax) 0.5 mg tablet     Other Visit Diagnoses       Primary insomnia    -  Primary    Relevant Medications    sertraline (Zoloft) 50 mg tablet    ALPRAZolam (Xanax) 0.5 mg tablet            Needs off until 6-30 retiring then  Definitely unable to work  Needs counseling  /meds  Depressed  No hi/si-not in crisis has plan if occurs  Overuse and abuse potential discussed with patient (parents if applicable)  If mood deterioration, status change etc on medicine, suicidal or homicidal ideations -patient agrees to proceed with crisis plan-in place-including-not limited to contacting family, our office and or proceeding to ER.    It is recommended that [place] reports be checked and patient have appointment at least every 3months(6 for certain  medicines only)  If the agreement signed (controlled substance agreement) is violated prescriptions may be stopped abruptly and patient /family understands and agrees to this.  Another reason for termination of agreement is if we have concern for abuse or overuse and it is also recommended that patient take responsibility to try to taper and minimize use of these medicines frequently trying to limit or gradually taper to discontinuation.    Patient is aware that side effects such as insomnia, unexpected weight changes are unexpected and should result in discontinuation.  Always use caution and AVOID operating machinery(driving etc) on pain medicines or CNS depressants and avoid combining together OR with alcohol. If opioids are prescribed patient understands the benefits of narcan and was offered prescription.  Follow up sooner if condition deteriorates or problems arise.  FMLA filled out  Will be attending support group    Agrees to regular follow and counseling for maximum benefits.

## 2023-04-18 ENCOUNTER — APPOINTMENT (OUTPATIENT)
Dept: PRIMARY CARE | Facility: CLINIC | Age: 68
End: 2023-04-18

## 2023-05-04 PROBLEM — M25.531 RIGHT WRIST PAIN: Status: RESOLVED | Noted: 2023-03-31 | Resolved: 2023-05-04

## 2023-05-04 PROBLEM — D06.9 HIGH GRADE SQUAMOUS INTRAEPITHELIAL LESION (HGSIL), GRADE 3 CIN, ON BIOPSY OF CERVIX: Status: RESOLVED | Noted: 2023-03-31 | Resolved: 2023-05-04

## 2023-05-04 PROBLEM — I51.9 HEART DISEASE: Status: RESOLVED | Noted: 2023-03-31 | Resolved: 2023-05-04

## 2023-05-04 PROBLEM — R87.810 CERVICAL HIGH RISK HUMAN PAPILLOMAVIRUS (HPV) DNA TEST POSITIVE: Status: RESOLVED | Noted: 2023-03-31 | Resolved: 2023-05-04

## 2023-05-04 PROBLEM — S52.509A DISTAL RADIUS FRACTURE: Status: RESOLVED | Noted: 2023-03-31 | Resolved: 2023-05-04

## 2023-05-04 PROBLEM — L98.9 SKIN LESION: Status: RESOLVED | Noted: 2023-03-31 | Resolved: 2023-05-04

## 2023-05-08 ENCOUNTER — OFFICE VISIT (OUTPATIENT)
Dept: PRIMARY CARE | Facility: CLINIC | Age: 68
End: 2023-05-08
Payer: COMMERCIAL

## 2023-05-08 VITALS
OXYGEN SATURATION: 97 % | HEART RATE: 65 BPM | HEIGHT: 63 IN | RESPIRATION RATE: 16 BRPM | DIASTOLIC BLOOD PRESSURE: 74 MMHG | BODY MASS INDEX: 35.26 KG/M2 | SYSTOLIC BLOOD PRESSURE: 118 MMHG | WEIGHT: 199 LBS | TEMPERATURE: 97.5 F

## 2023-05-08 DIAGNOSIS — I25.2 HISTORY OF MI (MYOCARDIAL INFARCTION): ICD-10-CM

## 2023-05-08 DIAGNOSIS — R10.9 LEFT FLANK PAIN: ICD-10-CM

## 2023-05-08 DIAGNOSIS — F51.01 PRIMARY INSOMNIA: ICD-10-CM

## 2023-05-08 DIAGNOSIS — F32.9 REACTIVE DEPRESSION: ICD-10-CM

## 2023-05-08 DIAGNOSIS — I25.10 3-VESSEL CAD: ICD-10-CM

## 2023-05-08 DIAGNOSIS — Z12.31 VISIT FOR SCREENING MAMMOGRAM: Primary | ICD-10-CM

## 2023-05-08 DIAGNOSIS — E78.5 HYPERLIPIDEMIA, UNSPECIFIED HYPERLIPIDEMIA TYPE: ICD-10-CM

## 2023-05-08 DIAGNOSIS — F43.21 GRIEF REACTION: ICD-10-CM

## 2023-05-08 PROCEDURE — 1036F TOBACCO NON-USER: CPT | Performed by: FAMILY MEDICINE

## 2023-05-08 PROCEDURE — 1160F RVW MEDS BY RX/DR IN RCRD: CPT | Performed by: FAMILY MEDICINE

## 2023-05-08 PROCEDURE — 1159F MED LIST DOCD IN RCRD: CPT | Performed by: FAMILY MEDICINE

## 2023-05-08 PROCEDURE — 99214 OFFICE O/P EST MOD 30 MIN: CPT | Performed by: FAMILY MEDICINE

## 2023-05-08 RX ORDER — SERTRALINE HYDROCHLORIDE 50 MG/1
75 TABLET, FILM COATED ORAL DAILY
Qty: 45 TABLET | Refills: 5 | Status: SHIPPED | OUTPATIENT
Start: 2023-05-08 | End: 2023-11-27

## 2023-05-08 RX ORDER — METOPROLOL SUCCINATE 25 MG/1
25 TABLET, EXTENDED RELEASE ORAL DAILY
Qty: 90 TABLET | Refills: 3 | Status: SHIPPED | OUTPATIENT
Start: 2023-05-08 | End: 2024-05-24

## 2023-05-08 NOTE — PROGRESS NOTES
"Subjective   Patient ID: Mikaela Cárdenas is a 67 y.o. female who presents for Hyperlipidemia and Coronary Artery Disease.  Covid vax: x 3  CRC: due 2026  Mammogram: 9/2021-ordered  Pap: 11/2022  Lmp: n/a  Still of course mourning dtrs death  Meds somewhat helpful  No hi/si  On trazodone helps sleep @50  Thinks may need more zoloft  Up to 1.5    HPI  Patient Active Problem List   Diagnosis    3-vessel CAD    Bruit of right carotid artery    Frequent UTI    Hematuria    Status post coronary artery bypass graft    History of ventricular fibrillation    Hyperlipidemia    Joint stiffness    Left flank pain    Uric acid nephrolithiasis    Obesity (BMI 35.0-39.9 without comorbidity)    History of MI (myocardial infarction)    Grief reaction       Past Surgical History:   Procedure Laterality Date    CERVICAL BIOPSY  2021    CERVICAL CONIZATION   W/ LASER  2021    COLONOSCOPY  2016    due 2026 (-)    CORONARY ANGIOPLASTY  2017    CORONARY STENT PLACEMENT  2017    x 4    EYE MUSCLE SURGERY  1960    x 2    FIBULA FRACTURE SURGERY Left 2012    TIBIA FRACTURE SURGERY Left 2012       Review of Systems +CAD  This patient has   NO history of recent Covid nor flu symptoms,  NO Fever nor chills,  NO Chest pain, shortness of breath nor paroxysmal nocturnal dyspnea,  NO Nausea, vomiting, nor diarrhea,  NO Hematochezia nor melena,  NO Dysuria, hematuria, nor new incontinence issues  NO new severe headaches nor neurological complaints,  NO new issues with anxiety nor depression nor new psychiatric complaints,  NO suicidal nor homicidal ideations.     OBJECTIVE:  /74   Pulse 65   Temp 36.4 °C (97.5 °F) (Temporal)   Resp 16   Ht 1.6 m (5' 3\")   Wt 90.3 kg (199 lb)   LMP  (LMP Unknown)   SpO2 97%   BMI 35.25 kg/m²      General:  alert, oriented, no acute distress.  No obvious skin rashes noted.   No gait disturbance noted.    Mood is pleasant, not tearful, no signs of emotional distress.  Not appearing intoxicated or altered. "   No voiced delusions,   Normal, appropriate behavior.    HEENT: Normocephalic, atraumatic,   Pupils round, reactive to light  Extraocular motions intact and wnl  Tympanic membranes normal    Neck: no nuchal rigidity  No masses palpable.  No carotid bruits.  No thyromegaly.    Respiratory: Equal breath sounds  No wheezes,    rales,    nor rhonchi  No respiratory distress.    Heart: Regular rate and rhythm, 3/6sys lsb   murmurs  no rubs/gallops    Abdomen: no masses palpable, nontender, no rebound nor guarding.    Extremities: NO cyanosis noted, no clubbing.   No edema noted.  2+dorsalis pedis pulses.    Normal-not antalgic, steady gait.    No visits with results within 3 Month(s) from this visit.   Latest known visit with results is:   Legacy Encounter on 08/29/2022   Component Date Value Ref Range Status    WBC, Urine 08/29/2022 8 (A)  0 - 5 /HPF Final    RBC, Urine 08/29/2022 7 (A)  0 - 5 /HPF Final    Squamous Epithelial Cells, Urine 08/29/2022 3  /HPF Final    Bacteria, Urine 08/29/2022 1+ (A)  /HPF Final    Mucus, Urine 08/29/2022 1+  /LPF Final    Creatinine 08/29/2022 0.92  0.50 - 1.05 mg/dL Final    GFR Female 08/29/2022 68  >90 mL/min/1.73m2 Final    Comment:  CALCULATIONS OF ESTIMATED GFR ARE PERFORMED   USING THE 2021 CKD-EPI STUDY REFIT EQUATION   WITHOUT THE RACE VARIABLE FOR THE IDMS-TRACEABLE   CREATININE METHODS.    https://jasn.asnjournals.org/content/early/2021/09/22/[email or web address]      Color, Urine 08/29/2022 YELLOW  STRAW,YELLOW Final    Appearance, Urine 08/29/2022 HAZY  CLEAR Final    Specific Gravity, Urine 08/29/2022 1.020  1.005 - 1.035 Final    pH, Urine 08/29/2022 5.0  5.0 - 8.0 Final    Protein, Urine 08/29/2022 NEGATIVE  NEGATIVE mg/dL Final    Glucose, Urine 08/29/2022 NEGATIVE  NEGATIVE mg/dL Final    Blood, Urine 08/29/2022 MODERATE(2+) (A)  NEGATIVE Final    Ketones, Urine 08/29/2022 NEGATIVE  NEGATIVE mg/dL Final    Bilirubin, Urine 08/29/2022 NEGATIVE  NEGATIVE Final     Urobilinogen, Urine 08/29/2022 2.0 (H)  0.0 - 1.9 mg/dL Final    Comment: Due to a manufacturing issue, low positive urobilinogen results may be   falsely positive.  Correlate with urine bilirubin and additional   clinical/laboratory findings to assess the risk of hemolytic anemia or liver   disease. If clinically indicated, repeat testing with an alternate method is   available by contacting the laboratory within 24 hours.  .  Some pigments and medications may cause a false positive urobilinogen.      Nitrite, Urine 08/29/2022 NEGATIVE  NEGATIVE Final    Leukocyte Esterase, Urine 08/29/2022 MODERATE (2+) (A)  NEGATIVE Final    Urea Nitrogen 08/29/2022 25 (H)  6 - 23 mg/dL Final        Assessment/Plan     Problem List Items Addressed This Visit          Nervous    Left flank pain       Circulatory    3-vessel CAD    Relevant Medications    metoprolol succinate XL (Toprol-XL) 25 mg 24 hr tablet       Other    Hyperlipidemia    History of MI (myocardial infarction)    Relevant Medications    metoprolol succinate XL (Toprol-XL) 25 mg 24 hr tablet     Other Visit Diagnoses       Visit for screening mammogram    -  Primary    Relevant Orders    BI mammo bilateral screening tomosynthesis        Rare use xanax  Overuse and abuse potential discussed with patient (parents if applicable)  If mood deterioration, status change etc on medicine, suicidal or homicidal ideations -patient agrees to proceed with crisis plan-in place-including-not limited to contacting family, our office and or proceeding to ER.    It is recommended that OARRS reports be checked and patient have appointment at least every 3months(6 for certain medicines only)  If the agreement signed (controlled substance agreement) is violated prescriptions may be stopped abruptly and patient /family understands and agrees to this.  Another reason for termination of agreement is if we have concern for abuse or overuse and it is also recommended that patient take  responsibility to try to taper and minimize use of these medicines frequently trying to limit or gradually taper to discontinuation.    Patient is aware that side effects such as insomnia, unexpected weight changes are unexpected and should result in discontinuation.  Always use caution and AVOID operating machinery(driving etc) on pain medicines or CNS depressants and avoid combining together OR with alcohol. If opioids are prescribed patient understands the benefits of narcan and was offered prescription.  Follow up sooner if condition deteriorates or problems arise.    Agrees to regular follow and counseling for maximum benefits.    Labs overdue  Sees cardio-needs follow up  See me 3-4mo -sooner if condition deterioration    The patient is aware that results will be forthcoming of ALL planned labs and or tests. If no results are received on my chart or by letter within 1 - 3 weeks, the patient is aware they need to call to obtain results as this is not usual.

## 2023-05-09 ENCOUNTER — APPOINTMENT (OUTPATIENT)
Dept: LAB | Facility: LAB | Age: 68
End: 2023-05-09
Payer: COMMERCIAL

## 2023-05-09 LAB
ALANINE AMINOTRANSFERASE (SGPT) (U/L) IN SER/PLAS: 17 U/L (ref 7–45)
ALBUMIN (G/DL) IN SER/PLAS: 4.1 G/DL (ref 3.4–5)
ALKALINE PHOSPHATASE (U/L) IN SER/PLAS: 96 U/L (ref 33–136)
ANION GAP IN SER/PLAS: 11 MMOL/L (ref 10–20)
ASPARTATE AMINOTRANSFERASE (SGOT) (U/L) IN SER/PLAS: 16 U/L (ref 9–39)
BASOPHILS (10*3/UL) IN BLOOD BY AUTOMATED COUNT: 0.02 X10E9/L (ref 0–0.1)
BASOPHILS/100 LEUKOCYTES IN BLOOD BY AUTOMATED COUNT: 0.3 % (ref 0–2)
BILIRUBIN TOTAL (MG/DL) IN SER/PLAS: 0.5 MG/DL (ref 0–1.2)
CALCIUM (MG/DL) IN SER/PLAS: 9 MG/DL (ref 8.6–10.3)
CARBON DIOXIDE, TOTAL (MMOL/L) IN SER/PLAS: 27 MMOL/L (ref 21–32)
CHLORIDE (MMOL/L) IN SER/PLAS: 108 MMOL/L (ref 98–107)
CHOLESTEROL (MG/DL) IN SER/PLAS: 127 MG/DL (ref 0–199)
CHOLESTEROL IN HDL (MG/DL) IN SER/PLAS: 44.3 MG/DL
CHOLESTEROL/HDL RATIO: 2.9
CREATININE (MG/DL) IN SER/PLAS: 0.97 MG/DL (ref 0.5–1.05)
EOSINOPHILS (10*3/UL) IN BLOOD BY AUTOMATED COUNT: 0.13 X10E9/L (ref 0–0.7)
EOSINOPHILS/100 LEUKOCYTES IN BLOOD BY AUTOMATED COUNT: 2 % (ref 0–6)
ERYTHROCYTE DISTRIBUTION WIDTH (RATIO) BY AUTOMATED COUNT: 13.8 % (ref 11.5–14.5)
ERYTHROCYTE MEAN CORPUSCULAR HEMOGLOBIN CONCENTRATION (G/DL) BY AUTOMATED: 32.3 G/DL (ref 32–36)
ERYTHROCYTE MEAN CORPUSCULAR VOLUME (FL) BY AUTOMATED COUNT: 95 FL (ref 80–100)
ERYTHROCYTES (10*6/UL) IN BLOOD BY AUTOMATED COUNT: 4.2 X10E12/L (ref 4–5.2)
GFR FEMALE: 64 ML/MIN/1.73M2
GLUCOSE (MG/DL) IN SER/PLAS: 105 MG/DL (ref 74–99)
HEMATOCRIT (%) IN BLOOD BY AUTOMATED COUNT: 39.9 % (ref 36–46)
HEMOGLOBIN (G/DL) IN BLOOD: 12.9 G/DL (ref 12–16)
IMMATURE GRANULOCYTES/100 LEUKOCYTES IN BLOOD BY AUTOMATED COUNT: 0.3 % (ref 0–0.9)
LDL: 66 MG/DL (ref 0–99)
LEUKOCYTES (10*3/UL) IN BLOOD BY AUTOMATED COUNT: 6.4 X10E9/L (ref 4.4–11.3)
LYMPHOCYTES (10*3/UL) IN BLOOD BY AUTOMATED COUNT: 1.91 X10E9/L (ref 1.2–4.8)
LYMPHOCYTES/100 LEUKOCYTES IN BLOOD BY AUTOMATED COUNT: 30 % (ref 13–44)
MONOCYTES (10*3/UL) IN BLOOD BY AUTOMATED COUNT: 0.42 X10E9/L (ref 0.1–1)
MONOCYTES/100 LEUKOCYTES IN BLOOD BY AUTOMATED COUNT: 6.6 % (ref 2–10)
NEUTROPHILS (10*3/UL) IN BLOOD BY AUTOMATED COUNT: 3.87 X10E9/L (ref 1.2–7.7)
NEUTROPHILS/100 LEUKOCYTES IN BLOOD BY AUTOMATED COUNT: 60.8 % (ref 40–80)
PLATELETS (10*3/UL) IN BLOOD AUTOMATED COUNT: 182 X10E9/L (ref 150–450)
POTASSIUM (MMOL/L) IN SER/PLAS: 3.9 MMOL/L (ref 3.5–5.3)
PROTEIN TOTAL: 6.6 G/DL (ref 6.4–8.2)
SODIUM (MMOL/L) IN SER/PLAS: 142 MMOL/L (ref 136–145)
THYROTROPIN (MIU/L) IN SER/PLAS BY DETECTION LIMIT <= 0.05 MIU/L: 1.76 MIU/L (ref 0.44–3.98)
TRIGLYCERIDE (MG/DL) IN SER/PLAS: 86 MG/DL (ref 0–149)
UREA NITROGEN (MG/DL) IN SER/PLAS: 20 MG/DL (ref 6–23)
VLDL: 17 MG/DL (ref 0–40)

## 2023-05-09 PROCEDURE — 80053 COMPREHEN METABOLIC PANEL: CPT

## 2023-05-09 PROCEDURE — 84443 ASSAY THYROID STIM HORMONE: CPT

## 2023-05-09 PROCEDURE — 84479 ASSAY OF THYROID (T3 OR T4): CPT

## 2023-05-09 PROCEDURE — 36415 COLL VENOUS BLD VENIPUNCTURE: CPT

## 2023-05-09 PROCEDURE — 83036 HEMOGLOBIN GLYCOSYLATED A1C: CPT

## 2023-05-09 PROCEDURE — 85025 COMPLETE CBC W/AUTO DIFF WBC: CPT

## 2023-05-09 PROCEDURE — 84436 ASSAY OF TOTAL THYROXINE: CPT

## 2023-05-09 PROCEDURE — 80061 LIPID PANEL: CPT

## 2023-05-10 LAB
ESTIMATED AVERAGE GLUCOSE FOR HBA1C: 108 MG/DL
HEMOGLOBIN A1C/HEMOGLOBIN TOTAL IN BLOOD: 5.4 %
THYROXINE (T4) (UG/DL) IN SER/PLAS: 7.2 UG/DL (ref 4.5–11.1)
THYROXINE (T4) FREE INDEX IN SER/PLAS BY CALCULATION: 2.4 (ref 1.6–4.7)
TRIIODOTHYRONINE RESIN UPTAKE (T3RU) % IN SER/PLAS: 33 % (ref 24–41)

## 2023-06-22 ENCOUNTER — OFFICE VISIT (OUTPATIENT)
Dept: PRIMARY CARE | Facility: CLINIC | Age: 68
End: 2023-06-22
Payer: COMMERCIAL

## 2023-06-22 DIAGNOSIS — R05.9 COUGH IN ADULT PATIENT: Primary | ICD-10-CM

## 2023-06-22 DIAGNOSIS — R09.81 NASAL CONGESTION WITH RHINORRHEA: ICD-10-CM

## 2023-06-22 DIAGNOSIS — J34.89 NASAL CONGESTION WITH RHINORRHEA: ICD-10-CM

## 2023-06-22 DIAGNOSIS — J00 NASOPHARYNGITIS: ICD-10-CM

## 2023-06-22 DIAGNOSIS — R68.89 FLU-LIKE SYMPTOMS: ICD-10-CM

## 2023-06-22 LAB
POC RAPID INFLUENZA A: NEGATIVE
POC RAPID INFLUENZA B: NEGATIVE

## 2023-06-22 PROCEDURE — 99213 OFFICE O/P EST LOW 20 MIN: CPT | Performed by: NURSE PRACTITIONER

## 2023-06-22 PROCEDURE — 87635 SARS-COV-2 COVID-19 AMP PRB: CPT

## 2023-06-22 PROCEDURE — 87804 INFLUENZA ASSAY W/OPTIC: CPT | Performed by: NURSE PRACTITIONER

## 2023-06-22 RX ORDER — AMOXICILLIN 500 MG/1
500 CAPSULE ORAL EVERY 8 HOURS SCHEDULED
Qty: 21 CAPSULE | Refills: 0 | Status: SHIPPED | OUTPATIENT
Start: 2023-06-22 | End: 2023-06-29

## 2023-06-22 ASSESSMENT — ENCOUNTER SYMPTOMS
SORE THROAT: 1
RHINORRHEA: 1
NAUSEA: 1
HEADACHES: 1
COUGH: 1

## 2023-06-22 NOTE — PROGRESS NOTES
"Subjective   Patient ID: Mikaela Cárdenas is a 68 y.o. female who presents for URI.    URI   The current episode started in the past 7 days. The problem has been gradually worsening. There has been no fever. Associated symptoms include congestion, coughing, headaches, nausea, rhinorrhea, sneezing and a sore throat. She has tried acetaminophen and NSAIDs for the symptoms. The treatment provided mild relief.        Review of Systems   HENT:  Positive for congestion, rhinorrhea, sneezing and sore throat.    Respiratory:  Positive for cough.    Gastrointestinal:  Positive for nausea.   Neurological:  Positive for headaches.       Objective   /90 (BP Location: Left arm, Patient Position: Sitting, BP Cuff Size: Adult)   Pulse 64   Temp 35.4 °C (95.8 °F)   Resp 16   Ht 1.6 m (5' 3\")   Wt 91.1 kg (200 lb 12.8 oz)   LMP  (LMP Unknown)   SpO2 97%   BMI 35.57 kg/m²     Physical Exam    Assessment/Plan          "

## 2023-06-22 NOTE — PROGRESS NOTES
Subjective   Patient ID: Mikaela Cárdenas is a 68 y.o. female who is with a chief complaint of symptoms of respiratory tract infection.     HPI  Patient is a 68 y.o. female who CONSULTED AT [facility] today. Patient is with complaints of nasal congestion, nasal discharge, headache / sinus pain, sore throat, cough, mild fatigue, nausea, chills, and diarrhea (1x). Patient denies having muscle ache, loss of sense of taste, loss of sense of smell, vomiting, nor fever. Patient states that present condition started about 5 days ago. Patient denies history of recent travel, exposure to person/people who tested positive for COVID 19, nor exposure to person/people with flu like symptoms. she denies shortness of breath, chest pain, palpitations, nor edema. she stated that she  tried OTC medications which afforded only slight relief of symptoms. she denies nausea, vomiting, abdominal pain, nor any other symptoms.    Patient states she had her COVID vaccine.  Patient states she have not yet received flu shot for this season.    Review of Systems  General: no weight loss, generally healthy, (+) mild fatigue  Head: (+) headaches / sinus pain, no vertigo, no injury  Eyes: no diplopia, no tearing, no pain,   Ears: no change in hearing, no tinnitus, no bleeding, no vertigo  Mouth:  no dental difficulties, no gingival bleeding, (+) sore throat, no loss of sense of taste  Nose: (+) congestion, (+) discharge, no bleeding, no obstruction, no loss of sense of smell  Neck: no stiffness, no pain, no tenderness, no masses, no bruit  Pulmonary: no dyspnea, no wheezing, no hemoptysis, (+) cough  Cardiovascular: no chest pain, no palpitations, no syncope, no orthopnea  Gastrointestinal: no change in appetite, no dysphagia, no abdominal pains, (+) diarrhea, (+) nausea, no emesis, no melena  Genito Urinary: no dysuria, no urinary urgency, no nocturia, no incontinence, no change in nature of urine  Musculoskeletal: no muscle  ache, no joint pain, no limitation of range of motion, no paresthesia, no numbness  Constitutional: no fever, (+) chills, no night sweats    Objective   Physical Exam  General: ambulatory, in no acute distress  Head: normocephalic, no lesions, no sinus tenderness  Eyes: pink palpebral conjunctiva, anicteric sclerae, PERRLA, EOM's full  Ears: clear external auditory canals, no ear discharge, no bleeding from the ears, tympanic membrane intact  Nose: (+) congested nasal mucosa, (+) yellow mucoid nasal discharge, no bleeding, no obstruction  Throat: (+) erythema, and (+) exudate on posterior pharyngeal wall, no lesion  Neck: supple, no masses, no bruits, no CLADP  Chest: symmetrical chest expansion, no lagging, no retractions, clear breath sounds, no rales, no wheezes    Assessment/Plan   Problem List Items Addressed This Visit    None  Visit Diagnoses       Cough in adult patient    -  Primary    Relevant Medications    amoxicillin (Amoxil) 500 mg capsule    Other Relevant Orders    Sars-CoV-2 PCR, Screen Asymptomatic    POCT Influenza A/B manually resulted (Completed)    Nasal congestion with rhinorrhea        Relevant Medications    amoxicillin (Amoxil) 500 mg capsule    Other Relevant Orders    Sars-CoV-2 PCR, Screen Asymptomatic    POCT Influenza A/B manually resulted (Completed)    Nasopharyngitis        Relevant Medications    amoxicillin (Amoxil) 500 mg capsule    Flu-like symptoms        Relevant Medications    amoxicillin (Amoxil) 500 mg capsule    Other Relevant Orders    Sars-CoV-2 PCR, Screen Asymptomatic    POCT Influenza A/B manually resulted (Completed)        DISCHARGE SUMMARY:   Patient was seen and examined. Diagnosis, treatment, treatment options, and possible complications of today's illness discussed and explained to patient. Patient to take medication/s associated with this visit. Patient may take OTC decongestant of choice as needed. Patient may also take OTC analgesic/antipyretic if needed for  pain/fever. Advised to increase oral fluid intake. Advised steam inhalation if needed to relieve congestion. Advised warm saline gargle if needed to relieve throat discomfort. Advised Listerine antiseptic mouthwash gargle TID. Patient may use Cepacol oral spray as needed to relieve throat discomfort. Patient was advised to discard the old toothbrush and use a new toothbrush beginning on the third of antibiotics. Advised to come back if with worsening or persistent symptoms. Patient verbalized understanding of plan of care.    Patient to come back in 7 - 10 days if needed for worsening symptoms.

## 2023-06-23 ENCOUNTER — DOCUMENTATION (OUTPATIENT)
Dept: PRIMARY CARE | Facility: CLINIC | Age: 68
End: 2023-06-23

## 2023-06-23 VITALS
SYSTOLIC BLOOD PRESSURE: 110 MMHG | HEIGHT: 63 IN | OXYGEN SATURATION: 97 % | DIASTOLIC BLOOD PRESSURE: 80 MMHG | HEART RATE: 64 BPM | BODY MASS INDEX: 35.58 KG/M2 | WEIGHT: 200.8 LBS | TEMPERATURE: 95.8 F | RESPIRATION RATE: 16 BRPM

## 2023-06-23 LAB — SARS-COV-2 RESULT: NOT DETECTED

## 2023-06-23 NOTE — PATIENT INSTRUCTIONS
DISCHARGE SUMMARY:   Patient was seen and examined. Diagnosis, treatment, treatment options, and possible complications of today's illness discussed and explained to patient. Patient to take medication/s associated with this visit. Patient may take OTC decongestant of choice as needed. Patient may also take OTC analgesic/antipyretic if needed for pain/fever. Advised to increase oral fluid intake. Advised steam inhalation if needed to relieve congestion. Advised warm saline gargle if needed to relieve throat discomfort. Advised Listerine antiseptic mouthwash gargle TID. Patient may use Cepacol oral spray as needed to relieve throat discomfort. Patient was advised to discard the old toothbrush and use a new toothbrush beginning on the third of antibiotics. Advised to come back if with worsening or persistent symptoms. Patient verbalized understanding of plan of care.    Patient to come back in 7 - 10 days if needed for worsening symptoms.

## 2023-06-23 NOTE — PROGRESS NOTES
I called and talked to patient. We discussed result of lab exams. Patient states symptoms are getting better. I advised to continue taking prescribed medication. Patient instructed to call if there are questions or increase in severity of symptoms. Patient verbalized understanding and agreed with plan of care.

## 2023-07-06 ENCOUNTER — TELEPHONE (OUTPATIENT)
Dept: PRIMARY CARE | Facility: CLINIC | Age: 68
End: 2023-07-06
Payer: MEDICARE

## 2023-07-06 NOTE — PROGRESS NOTES
Writer outreached pt regarding their referral to Collaborative Care. Explained program and answered pt's questions. Pt requested to schedule future initial assessment. Pt is scheduled for 0/29 @ 3pm in person.

## 2023-08-07 ENCOUNTER — TELEPHONE (OUTPATIENT)
Dept: PRIMARY CARE | Facility: CLINIC | Age: 68
End: 2023-08-07
Payer: MEDICARE

## 2023-08-07 NOTE — PROGRESS NOTES
Writer attempted to reach pt due to a need to change to telehealth session on 08/29. LVM requesting follow up. Also offered an alternative date/time in the event that pt would prefer an in person appt.

## 2023-08-29 ENCOUNTER — SOCIAL WORK (OUTPATIENT)
Dept: PRIMARY CARE | Facility: CLINIC | Age: 68
End: 2023-08-29
Payer: MEDICARE

## 2023-08-29 ENCOUNTER — APPOINTMENT (OUTPATIENT)
Dept: PRIMARY CARE | Facility: CLINIC | Age: 68
End: 2023-08-29
Payer: MEDICARE

## 2023-08-29 ASSESSMENT — ANXIETY QUESTIONNAIRES
6. BECOMING EASILY ANNOYED OR IRRITABLE: NOT AT ALL
2. NOT BEING ABLE TO STOP OR CONTROL WORRYING: NOT AT ALL
IF YOU CHECKED OFF ANY PROBLEMS ON THIS QUESTIONNAIRE, HOW DIFFICULT HAVE THESE PROBLEMS MADE IT FOR YOU TO DO YOUR WORK, TAKE CARE OF THINGS AT HOME, OR GET ALONG WITH OTHER PEOPLE: NOT DIFFICULT AT ALL
5. BEING SO RESTLESS THAT IT IS HARD TO SIT STILL: NOT AT ALL
3. WORRYING TOO MUCH ABOUT DIFFERENT THINGS: SEVERAL DAYS
7. FEELING AFRAID AS IF SOMETHING AWFUL MIGHT HAPPEN: NOT AT ALL
1. FEELING NERVOUS, ANXIOUS, OR ON EDGE: SEVERAL DAYS
GAD7 TOTAL SCORE: 2
4. TROUBLE RELAXING: NOT AT ALL

## 2023-08-29 ASSESSMENT — PATIENT HEALTH QUESTIONNAIRE - PHQ9
10. IF YOU CHECKED OFF ANY PROBLEMS, HOW DIFFICULT HAVE THESE PROBLEMS MADE IT FOR YOU TO DO YOUR WORK, TAKE CARE OF THINGS AT HOME, OR GET ALONG WITH OTHER PEOPLE: SOMEWHAT DIFFICULT
SUM OF ALL RESPONSES TO PHQ9 QUESTIONS 1 & 2: 1
7. TROUBLE CONCENTRATING ON THINGS, SUCH AS READING THE NEWSPAPER OR WATCHING TELEVISION: SEVERAL DAYS
SUM OF ALL RESPONSES TO PHQ QUESTIONS 1-9: 7
8. MOVING OR SPEAKING SO SLOWLY THAT OTHER PEOPLE COULD HAVE NOTICED. OR THE OPPOSITE, BEING SO FIGETY OR RESTLESS THAT YOU HAVE BEEN MOVING AROUND A LOT MORE THAN USUAL: NOT AT ALL
5. POOR APPETITE OR OVEREATING: SEVERAL DAYS
1. LITTLE INTEREST OR PLEASURE IN DOING THINGS: NOT AT ALL
3. TROUBLE FALLING OR STAYING ASLEEP: SEVERAL DAYS
4. FEELING TIRED OR HAVING LITTLE ENERGY: NEARLY EVERY DAY
6. FEELING BAD ABOUT YOURSELF - OR THAT YOU ARE A FAILURE OR HAVE LET YOURSELF OR YOUR FAMILY DOWN: NOT AT ALL
2. FEELING DOWN, DEPRESSED OR HOPELESS: SEVERAL DAYS
9. THOUGHTS THAT YOU WOULD BE BETTER OFF DEAD, OR OF HURTING YOURSELF: NOT AT ALL

## 2023-08-29 NOTE — PROGRESS NOTES
"Collaborative Care (CoCM) Initial Assessment    Session Time  Start: 3:00pm  End: 3:44pm     Collaborative Care program information (including case discussion with psychiatry, involvement of [facility] and billing when applicable) was provided and discussed with the patient. Patient Indicated understanding and agreed to proceed.   Confirm: Yes    Patient Health Questionnaire-9 Score: 7 (8/29/2023  3:03 PM)  SHY-7 Total Score: 2 (8/29/2023  3:06 PM)    Reason for Visit / Chief Complaint  Chief Complaint   Patient presents with    Depression     Accompanied by: Self  Guardian Status: Self  Caregiver Status: Does not have a caregiver    Pt shared she was originally referred for, \"My first appointment with her was when my daughter passed away in March, I had to call her and she prescribed me Xanax just to get through everything. That is when she said I needed to get through some type of counseling to help me with my grief. At that time, I was real anxious and I couldn't sleep\". Pt's daughter was 31yo.     Review of Symptoms    Sleep   Sleep Symptoms: awakes 1-2 x night Pt shared, \"I just couldn't fall asleep. My mind would race and I would get up, I couldn't concentrate to read to even make me tired. Now that trazodone mg,  usually kicks in in about an hour or so. I don't know if I'm use to it. But I'll wake up a couple times during the night. But I'm able to get back to sleep which is good. So I guess it's working. I remember when I was on 100 mg of Trazodone, the 50 is working but obviously it's not as strong\". Uncertain if she would like to increase her medication.     Mood   Symptom Onset/Duration: Last 4-6 months  Current Sx: trouble staying asleep and feeling tired/little energy Pt defined her mood on an average basis now, \"It's better, I go places to go out to eat and I go with my friends. I think I'm doing good, I'm doing much better. But I do have bad days. Especially on the weekends. I'm always trying to find " "something to do to keep me busy, either call a friend and go over there or go over to my cousins house so I'm not alone, it's just hard\". Daughter lived with her.   Triggers:  Grief    Anxiety   Symptom Onset/Duration: Last 4-6 months  Current Sx: feeling nervous/anxious/on edge Pt shared, \"When I had anxiety I had sensation going up my arms. I could feel it physically. The Zoloft has taken care of that, I could sit still. I would just cry a lot\". Was prescribed Xanax to get through . When she went through her divorce both Xanax and Zoloft worked well for her ().     Appetite   Description of Overall Appetite: decreased appetite  Eating Behaviors: prepares meals Pt shared, \"I don't eat as much as I used to, I just a little bit, I have an appetite but I'm definitely don't eat as much as I used to or could. I don't cook anymore\".   Concerns with appetite: not feeling hungry often    Anger / Irritability  Symptoms of Anger / Irritability: none, denied     Trauma    Symptoms Onset/Duration: symptoms more than one month  Traumatic Experiences: traumatic grief and Divorce       Grief / Loss / Adjustment   Symptom Onset/Duration: more than 6 months  Current Sx: tearfulness Pts daughter passed away in March (31yo), they lived together.  of cervical cancer. Five days of radiation, and one of those days she had chemo. At the end of her tx she was doing real well, she went for internal radiation. The night before she was going to come home she went into cardiac arrest. Pt stated, \"I did real good as long as I don't have to talk about it, if I don't have to talk about her or think about her I do real good\". Daughters dog still resides with pt, feels this is helpful.   Factors of Grief / Loss / Adjustment: loss of loved one(s)    Learning Concerns / Memory   Learning Concerns & Sx: trouble with focus and concentrating Pt shared, \"When I'm trying to do my checkbook or something like that, sometimes I have to look " "away and think real hard because I forget what I'm doing. I am forgetful, very forgetful\". Started around the same times as when her daughter passed away.     Functional impairment   Impacting ADL's: Reported the first week after her daughter  she did not wish to do anything, does not feel this is an issue now.     Associated Medical Concerns   Potential Associated Factors:  Pt shared having a heart attack in 2017 and having 4 stents. No issues since.     Comprehensive Behavioral Health History     Medications  Current Mental Health Medications:   trazodone / Desyrel; Dose: 50mg; Side effects: None, denied  Xanax / alprazolam; Dose: 0.5mg; Side effects: None, denied  Sertraline; Dose: 75mg; Side effects: None, denied    Pt reported that last time she used the Xanax was a couple months ago. Only uses if something triggers her and she cannot calm down. Believes the Sertraline does help her manage her anxiety and depression. Has a hx of being prescribed Trazodone at 100mg and felt she was able to fall asleep more soundly ().     Past Mental Health Medications:   None/Unknown No hx of any other medications.     Open to medication recommendations from consulting psychiatrist? Yes    Mental Health Treatment History  Mental Health Treatment: individual therapy  Reason/When/Where/Outcome:  through [facility]. Went for an extended period of time. Pt shared, \"It was helpful, I was real sick. I was bad. It was different. 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 I had a lot of decisions. It effected me differently\".     Risk History  Suicidal Thoughts/Method/Intent/Plan: None, denied    Substance Use History    Substances    Social History     Substance and Sexual Activity   Alcohol Use Yes    Comment: rare     Social History     Substance and Sexual Activity   Drug Use Never       Substance Current Use   Alcohol " "Occasional use                 Family History    Mental Health / Conditions    Family Member Condition / Diagnosis Medications / Side Effects   Brother Schizoaffective  None/Unknown                    Substance Use    Family Member Substance Current Use   Ex  recovering alcoholic                      History of Suicide    Family Member Details             Social History    Housing   Living Situation: Lives alone  Safe Housing Conditions / Feels Safe in Home: Yes    Employment  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   Current Concerns/Challenges: No    Income   Current Concerns/Challenges: Yes, describe: Retired   Receive Benefits/Assistance: No    Education   Status / Level of Education: Bachelor's degree    Legal   Legal Considerations: None, denied    Relationships   S/O:     Siblings: Daughter  at 31yo, Son 39yo. Pt's reported being closer to her daughter. He has been helpful in her grief journey.   Friends: Has a large [place] of friends   Other: Close with her cousin        Active Duty? No    Catholic/ Spirituality   Are you Pentecostalism or Spiritual: Yes  Catholic / Practice: Faith    Coping / Strengths / Supports   Coping: Pt shared, \"Sometimes I like to read before I fall asleep, after I take my pill I read a little bit. I take Edmund for walks, that helps\".   Strengths: Pt shared, \"I guess I am kind, easy going usually, friendly\".   Supports: Cousin and son, couple good friends     Assessment Summary  / Plan    Assessment Summary:  What do you want to work on/get out of collaborative care? Pt shared, \"Just coping skills, how to accept what happened and have some coping skills, be able to talk about it. My friend tells me that I can talk to her and ask God, and I just don't feel like it. It would make me feel bad if I talk to her. I'm not ready to do that, I'll just start crying. I just wanna control my emotions sometimes, I wanna feel " "better. Not be jealous of people that have daughters that are alive\".     Grief Therapy, ACT ---Pt was open to starting to journal before bed.     Encourage engaging in grief therapy support group/ presented as open to engaging in grief support group.      Plan:   bi-weekly, Erpmlgi-[id number]-Clojxraf interventions, provide psycho-education, and provide appropriate tx referrals    Follow up in 15 days (on 9/13/2023).    Provisional Findings / Impressions  Primary: Grief Reaction    "

## 2023-08-31 ENCOUNTER — DOCUMENTATION (OUTPATIENT)
Dept: PRIMARY CARE | Facility: CLINIC | Age: 68
End: 2023-08-31
Payer: MEDICARE

## 2023-08-31 DIAGNOSIS — F43.21 GRIEF REACTION: Primary | ICD-10-CM

## 2023-08-31 PROCEDURE — 99492 1ST PSYC COLLAB CARE MGMT: CPT | Performed by: FAMILY MEDICINE

## 2023-09-13 ENCOUNTER — SOCIAL WORK (OUTPATIENT)
Dept: PRIMARY CARE | Facility: CLINIC | Age: 68
End: 2023-09-13
Payer: MEDICARE

## 2023-09-13 ENCOUNTER — DOCUMENTATION (OUTPATIENT)
Dept: BEHAVIORAL HEALTH | Facility: CLINIC | Age: 68
End: 2023-09-13

## 2023-09-13 NOTE — PROGRESS NOTES
[facility] Psychiatry Consult Note     Mikaela Cárdenas is a 68 y.o., referred to Collaborative Care for symptoms of grief and anxiety. I have reviewed the patient with the behavioral health manager and reviewed the patient's electronic record. She does not wish to have new medications at this time, and prefers counseling    Recommendations:  Cont trazodone for sleep at this time  Cont counseling  No other meds warranted at this time      The above treatment considerations and suggestions are based on consultations with the patient's care manager and a review of information available in the electronic medical record. I have not personally examined the patient. All recommendations should be implemented with consideration of the patient's relevant prior history and current clinical status. Please feel free to call me with any questions about the care of this patient. I can easily be reached through [email or web address].

## 2023-09-13 NOTE — PROGRESS NOTES
Collaborative Care (CoCM)  Progress Note    Type of Interaction: Virtual    Start Time: 3:30pm    End Time: 4:20pm    Appointment: Not Scheduled    Reason for Visit:   Chief Complaint   Patient presents with    Depression      Interval History / Patient Symptoms:     Pt shared highs and lows of depressive sxs associated with grief     Interventions Provided: [place] Setting, Psychoeducation, Acceptance & Commitment Therapy, Motivational Interviewing, Strengths Exploration, Grief Work, Develop Coping Strategies, Review Progress/Goals Stress Management, Mindfulness, and Treatment Planning    Progress Made: Moderate    Response to Intervention:     Review psych recs, pt expressed possibly wanting to explore trazodone increase in the future if sleep disturbances continue  Reported that she tends to toss and turn all night waiting for medication to “kick in”, has been reading before bed for sleep hygiene  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 PHQ/SHY at next appt.     Patient Instructions   Pt is scheduled for telehealth follow up on 9/27 @ 330pm.     Follow Up / Next Appointment: Next appointment: 09/27/23

## 2023-09-24 DIAGNOSIS — F51.01 PRIMARY INSOMNIA: ICD-10-CM

## 2023-09-25 RX ORDER — TRAZODONE HYDROCHLORIDE 50 MG/1
50 TABLET ORAL NIGHTLY PRN
Qty: 30 TABLET | Refills: 5 | Status: SHIPPED | OUTPATIENT
Start: 2023-09-25 | End: 2024-03-11

## 2023-09-27 ENCOUNTER — SOCIAL WORK (OUTPATIENT)
Dept: PRIMARY CARE | Facility: CLINIC | Age: 68
End: 2023-09-27
Payer: MEDICARE

## 2023-09-27 NOTE — PROGRESS NOTES
Collaborative Care (CoCM)  Progress Note    Type of Interaction: Virtual    Start Time: 3:30pm    End Time: 4:17pm    Appointment: Not Scheduled    Reason for Visit:   Chief Complaint   Patient presents with    Depression      Interval History / Patient Symptoms:     Improved, denied any further concerns with sleep     Interventions Provided: [place] Setting, Psychoeducation, Acceptance & Commitment Therapy, Motivational Interviewing, Strengths Exploration, Values Exploration, Grief Work, Develop Coping Strategies, Referrals, and Mindfulness    Progress Made: Moderate    Response to Intervention:     Pt processed feelings regarding social media triggers and [employer]   Continues to journal, continues to struggle with journaling her emotions  Pt shared that she and her cousin are going to a spiritual dinner, explored her expectations and her own spirituality   Pt shared additional stressors with being her brother's guardian and his request to live independently, currently he resides in group home.   Pt reported that she has looking into grief and loss groups, is interested in the Parents Together group along with Grief in the 1st Year, encouraged pt to try both   Explored ways pt can continue to express boundaries and emotions with support system    Plan: Check in with pt in three weeks on ways she has connected with her community for support in managing her grief.     Patient Instructions   Pt is scheduled for telehealth follow up on 10/18 @ 3:30pm    Follow Up / Next Appointment: Next appointment: 10/18/23

## 2023-09-28 ENCOUNTER — DOCUMENTATION (OUTPATIENT)
Dept: PRIMARY CARE | Facility: CLINIC | Age: 68
End: 2023-09-28
Payer: MEDICARE

## 2023-09-28 DIAGNOSIS — F43.21 GRIEF REACTION: Primary | ICD-10-CM

## 2023-09-28 PROCEDURE — 99493 SBSQ PSYC COLLAB CARE MGMT: CPT | Performed by: FAMILY MEDICINE

## 2023-09-28 PROCEDURE — 99494 1ST/SBSQ PSYC COLLAB CARE: CPT | Performed by: FAMILY MEDICINE

## 2023-10-09 ENCOUNTER — OFFICE VISIT (OUTPATIENT)
Dept: PRIMARY CARE | Facility: CLINIC | Age: 68
End: 2023-10-09
Payer: MEDICARE

## 2023-10-09 VITALS
RESPIRATION RATE: 16 BRPM | TEMPERATURE: 97 F | BODY MASS INDEX: 35.44 KG/M2 | HEIGHT: 63 IN | SYSTOLIC BLOOD PRESSURE: 124 MMHG | WEIGHT: 200 LBS | DIASTOLIC BLOOD PRESSURE: 66 MMHG | HEART RATE: 64 BPM | OXYGEN SATURATION: 98 %

## 2023-10-09 DIAGNOSIS — E66.01 OBESITY, MORBID (MULTI): Primary | ICD-10-CM

## 2023-10-09 DIAGNOSIS — E78.5 HYPERLIPIDEMIA, UNSPECIFIED HYPERLIPIDEMIA TYPE: ICD-10-CM

## 2023-10-09 DIAGNOSIS — F43.21 GRIEF REACTION: ICD-10-CM

## 2023-10-09 DIAGNOSIS — I25.10 3-VESSEL CAD: ICD-10-CM

## 2023-10-09 DIAGNOSIS — Z23 NEED FOR VACCINATION: ICD-10-CM

## 2023-10-09 PROCEDURE — 1036F TOBACCO NON-USER: CPT | Performed by: FAMILY MEDICINE

## 2023-10-09 PROCEDURE — 1125F AMNT PAIN NOTED PAIN PRSNT: CPT | Performed by: FAMILY MEDICINE

## 2023-10-09 PROCEDURE — G0009 ADMIN PNEUMOCOCCAL VACCINE: HCPCS | Performed by: FAMILY MEDICINE

## 2023-10-09 PROCEDURE — 90662 IIV NO PRSV INCREASED AG IM: CPT | Performed by: FAMILY MEDICINE

## 2023-10-09 PROCEDURE — 1160F RVW MEDS BY RX/DR IN RCRD: CPT | Performed by: FAMILY MEDICINE

## 2023-10-09 PROCEDURE — 99214 OFFICE O/P EST MOD 30 MIN: CPT | Performed by: FAMILY MEDICINE

## 2023-10-09 PROCEDURE — 90677 PCV20 VACCINE IM: CPT | Performed by: FAMILY MEDICINE

## 2023-10-09 PROCEDURE — 1159F MED LIST DOCD IN RCRD: CPT | Performed by: FAMILY MEDICINE

## 2023-10-09 PROCEDURE — 3008F BODY MASS INDEX DOCD: CPT | Performed by: FAMILY MEDICINE

## 2023-10-09 PROCEDURE — G0008 ADMIN INFLUENZA VIRUS VAC: HCPCS | Performed by: FAMILY MEDICINE

## 2023-10-09 RX ORDER — ALENDRONATE SODIUM 70 MG/1
TABLET ORAL
COMMUNITY
Start: 2023-08-08

## 2023-10-09 RX ORDER — SEMAGLUTIDE 0.25 MG/.5ML
0.25 INJECTION, SOLUTION SUBCUTANEOUS
Qty: 2 ML | Refills: 3 | Status: SHIPPED | OUTPATIENT
Start: 2023-10-09 | End: 2024-05-07

## 2023-10-09 ASSESSMENT — ENCOUNTER SYMPTOMS
OCCASIONAL FEELINGS OF UNSTEADINESS: 0
DEPRESSION: 0
LOSS OF SENSATION IN FEET: 0

## 2023-10-09 NOTE — PROGRESS NOTES
"Subjective   Patient ID: Mikaela Cárdenas is a 68 y.o. female who presents for Hyperlipidemia and Coronary Artery Disease.  Covid vax: x 3  CRC: due 2026  Mammogram: 6/2023   Pap: 5/2023  Lmp: n/a     HPI  Patient Active Problem List   Diagnosis    3-vessel CAD    Bruit of right carotid artery    Frequent UTI    Hematuria    Status post coronary artery bypass graft    History of ventricular fibrillation    Hyperlipidemia    Joint stiffness    Left flank pain    Uric acid nephrolithiasis    Obesity (BMI 35.0-39.9 without comorbidity)    History of MI (myocardial infarction)    Grief reaction    Obesity, morbid (CMS/HCC)       Past Surgical History:   Procedure Laterality Date    CERVICAL BIOPSY  2021    CERVICAL CONIZATION   W/ LASER  2021    COLONOSCOPY  2016    due 2026 (-)    CORONARY ANGIOPLASTY  2017    CORONARY STENT PLACEMENT  2017    x 4    EYE MUSCLE SURGERY  1960    x 2    FIBULA FRACTURE SURGERY Left 2012    TIBIA FRACTURE SURGERY Left 2012       Review of Systems sees CARDIO  No cva    This patient has   NO history of recent Covid nor flu symptoms,  NO Fever nor chills,  NO Chest pain, shortness of breath nor paroxysmal nocturnal dyspnea,  NO Nausea, vomiting, nor diarrhea,  NO Hematochezia nor melena,  NO Dysuria, hematuria, nor new incontinence issues  NO new severe headaches nor neurological complaints,  NO new issues with anxiety nor depression nor new psychiatric complaints,  NO suicidal nor homicidal ideations.     OBJECTIVE:  /66   Pulse 64   Temp 36.1 °C (97 °F) (Temporal)   Resp 16   Ht 1.6 m (5' 3\")   Wt 90.7 kg (200 lb)   LMP  (LMP Unknown)   SpO2 98%   BMI 35.43 kg/m²      General:  alert, oriented, no acute distress.  No obvious skin rashes noted.   No gait disturbance noted.    Mood is pleasant, not tearful, no signs of emotional distress.  Not appearing intoxicated or altered.   No voiced delusions,   Normal, appropriate behavior.    HEENT: Normocephalic, atraumatic,   Pupils " round, reactive to light  Extraocular motions intact and wnl  Tympanic membranes normal    Neck: no nuchal rigidity  No masses palpable.  No carotid bruits.  No thyromegaly.    Respiratory: Equal breath sounds  No wheezes,    rales,    nor rhonchi  No respiratory distress.    Heart: Regular rate and rhythm, no    murmurs  no rubs/gallops    Abdomen: no masses palpable, nontender, no rebound nor guarding.    Extremities: NO cyanosis noted, no clubbing.   No edema noted.  2+dorsalis pedis pulses.    Normal-not antalgic, steady gait.    No visits with results within 3 Month(s) from this visit.   Latest known visit with results is:   Office Visit on 06/22/2023   Component Date Value Ref Range Status    POC Rapid Influenza A 06/22/2023 Negative  Negative Final    POC Rapid Influenza B 06/22/2023 Negative  Negative Final    SARS-CoV-2 Result 06/22/2023 NOT DETECTED  Not Detected Final    Comment: .  This assay is designed to detect the ORF1ab and/or S genes of SARS-CoV-2 via   nucleic acid amplification. A Not Detected result does not preclude   2019-nCoV infection since the adequacy of sample collection and/or low viral   burden may result in presence of viral nucleic acids below the clinical   sensitivity of this test method.     Fact sheet for providers: www.fda.gov/media/584627/download  Fact sheet for patients: www.fda.gov/media/484332/download    This test has received FDA Emergency Use Authorization (EUA) and has been   verified by [facility] ([facility]). This test   is only authorized for the duration of time that circumstances exist to   justify the authorization of the emergency use of in vitro diagnostic tests   for the detection of SARS-CoV-2 virus and/or diagnosis of COVID-19 infection   under section 564(b)(1) of the Act, 21 U.S.C. 360bbb-3(b)(1), unless the   authorization is terminated or revoked sooner.      [facility]  Center is certified under CLIA-88 as   qualified to perform high complexity testing. Testing is performed in the   [facility] laboratories located at [address] [place] [address].        Assessment/Plan     Problem List Items Addressed This Visit       3-vessel CAD    Hyperlipidemia    Grief reaction    Obesity, morbid (CMS/HCC) - Primary     Other Visit Diagnoses       Need for vaccination        Relevant Orders    Flu vaccine, quadrivalent, high-dose, preservative free, age 65y+ (FLUZONE)    Pneumococcal conjugate vaccine, 20-valent (PREVNAR 20)            No new issues  In counseling  Labs in spring  No hi/si  Rare use xanax Overuse and abuse potential discussed with patient (parents if applicable)  If mood deterioration, status change etc on medicine, suicidal or homicidal ideations -patient agrees to proceed with crisis plan-in place-including-not limited to contacting family, our office and or proceeding to ER.    It is recommended that OARRS reports be checked and patient have appointment at least every 3months(6 for certain medicines only)  If the agreement signed (controlled substance agreement) is violated prescriptions may be stopped abruptly and patient /family understands and agrees to this.  Another reason for termination of agreement is if we have concern for abuse or overuse and it is also recommended that patient take responsibility to try to taper and minimize use of these medicines frequently trying to limit or gradually taper to discontinuation.    Patient is aware that side effects such as insomnia, unexpected weight changes are unexpected and should result in discontinuation.  Always use caution and AVOID operating machinery(driving etc) on pain medicines or CNS depressants and avoid combining together OR with alcohol. If opioids are prescribed patient understands the benefits of narcan and was offered prescription.  Follow up sooner if condition deteriorates or problems arise.    Agrees to  regular follow and counseling for maximum benefits.    Reqs med for wt loss  Low carb diet advised  Reqs wegovy This medications risks, benefits, and alternatives were discussed with patient at length.   If any unwanted side effects occur-discontinue medicine and call the office for discussion.  See me 4mo cmp hba1c  Offered metformin as well-chose wegovy  On aledronate now-aware of rba

## 2023-10-12 ENCOUNTER — TELEPHONE (OUTPATIENT)
Dept: PRIMARY CARE | Facility: CLINIC | Age: 68
End: 2023-10-12
Payer: MEDICARE

## 2023-10-12 NOTE — TELEPHONE ENCOUNTER
Johanna pt was prescribed Wegovy and she states her insurance won't cover it nor will they cover any meds for weight loss. Pt is NOT diabetic and reached out to Mirtha to see if there were options and she stated no. Recommendations?? Pt can be reached at [phone]

## 2023-10-18 ENCOUNTER — SOCIAL WORK (OUTPATIENT)
Dept: PRIMARY CARE | Facility: CLINIC | Age: 68
End: 2023-10-18
Payer: MEDICARE

## 2023-10-19 NOTE — PROGRESS NOTES
Collaborative Care (CoCM)  Progress Note    Type of Interaction: Virtual    Start Time: 3:26pm    End Time: 4:03pm    Appointment: Not Scheduled    Reason for Visit:   Chief Complaint   Patient presents with    Depression      Interval History / Patient Symptoms:     Improved     Interventions Provided: [place] Setting, Acceptance & Commitment Therapy, Motivational Interviewing, Strengths Exploration, Values Exploration, Grief Work, Review Progress/Goals Stress Management, and Mindfulness    Progress Made: Moderate    Response to Intervention:     Continues to struggle with exploring her feeling through journaling, write a few sentences.   Spiritual dinner was fun, gave her comfort   Avoidance on moving forward with guardian needs for her brother  Has not felt physically well, has yet to try a grief support group. Continues to be open to the idea  Declined the nutrition consultant, “I know what I need to do”. Wants to join gym.     Plan: PHQ/SHY update    Patient Instructions   Pt is scheduled for video follow up on 11/15 @ 3:30pm    Follow Up / Next Appointment: Next appointment: 11/15/23

## 2023-10-31 ENCOUNTER — DOCUMENTATION (OUTPATIENT)
Dept: PRIMARY CARE | Facility: CLINIC | Age: 68
End: 2023-10-31
Payer: MEDICARE

## 2023-10-31 DIAGNOSIS — F43.21 GRIEF REACTION: Primary | ICD-10-CM

## 2023-10-31 PROCEDURE — G2214 INIT/SUB PSYCH CARE M 1ST 30: HCPCS | Performed by: FAMILY MEDICINE

## 2023-11-08 ENCOUNTER — TELEPHONE (OUTPATIENT)
Dept: PRIMARY CARE | Facility: CLINIC | Age: 68
End: 2023-11-08
Payer: MEDICARE

## 2023-11-15 ENCOUNTER — SOCIAL WORK (OUTPATIENT)
Dept: PRIMARY CARE | Facility: CLINIC | Age: 68
End: 2023-11-15
Payer: MEDICARE

## 2023-11-15 NOTE — PROGRESS NOTES
Collaborative Care (CoCM)  Progress Note    Type of Interaction: Virtual    Start Time: 3:30pm    End Time: 4:02pm    Appointment: Not Scheduled    Reason for Visit:   Chief Complaint   Patient presents with    Depression    Anxiety      Interval History / Patient Symptoms:     Pt endorsed slight improvement in mood     Interventions Provided: [place] Setting, Acceptance & Commitment Therapy, Motivational Interviewing, Strengths Exploration, Grief Work, Review Progress/Goals Stress Management, and Treatment Planning    Progress Made: Moderate    Response to Intervention:     Pt endorsed slight improvement in mood, stated “I think I'm doing better”  Continues to consider grief support groups, has not engaged yet  Reported increased depression due to the holidays approaching  Planning on going to her son's for [place] for the [place]  Increased stressed due to being her brothers legal guardian/supported pt in problem solving and encouraged katie  Encouraged continuing to journal, pt shared this has helped her with sleep at night    Plan: Pt requested to schedule out two months, unsure if she needs to continue brief therapy. Writer to check in with pt on 1/17 on mood stability    Patient Instructions   Pt is scheduled for Jan 17th at 3:30pm via video     Follow Up / Next Appointment: Next appointment: 01/17/24

## 2023-11-27 DIAGNOSIS — F43.21 GRIEF REACTION: ICD-10-CM

## 2023-11-27 DIAGNOSIS — F51.01 PRIMARY INSOMNIA: ICD-10-CM

## 2023-11-27 RX ORDER — SERTRALINE HYDROCHLORIDE 50 MG/1
75 TABLET, FILM COATED ORAL DAILY
Qty: 45 TABLET | Refills: 5 | Status: SHIPPED | OUTPATIENT
Start: 2023-11-27 | End: 2024-05-07 | Stop reason: SDUPTHER

## 2023-11-30 ENCOUNTER — DOCUMENTATION (OUTPATIENT)
Dept: PRIMARY CARE | Facility: CLINIC | Age: 68
End: 2023-11-30
Payer: MEDICARE

## 2023-11-30 DIAGNOSIS — F43.21 GRIEF REACTION: Primary | ICD-10-CM

## 2023-11-30 PROCEDURE — 99493 SBSQ PSYC COLLAB CARE MGMT: CPT | Performed by: FAMILY MEDICINE

## 2024-01-05 DIAGNOSIS — E78.2 MIXED HYPERLIPIDEMIA: ICD-10-CM

## 2024-01-08 RX ORDER — ATORVASTATIN CALCIUM 80 MG/1
80 TABLET, FILM COATED ORAL DAILY
Qty: 90 TABLET | Refills: 3 | Status: SHIPPED | OUTPATIENT
Start: 2024-01-08

## 2024-01-17 ENCOUNTER — SOCIAL WORK (OUTPATIENT)
Dept: PRIMARY CARE | Facility: CLINIC | Age: 69
End: 2024-01-17
Payer: MEDICARE

## 2024-01-17 ENCOUNTER — TELEPHONE (OUTPATIENT)
Dept: PRIMARY CARE | Facility: CLINIC | Age: 69
End: 2024-01-17

## 2024-01-17 DIAGNOSIS — F43.21 GRIEF REACTION: ICD-10-CM

## 2024-01-17 RX ORDER — SERTRALINE HYDROCHLORIDE 100 MG/1
100 TABLET, FILM COATED ORAL DAILY
Qty: 90 TABLET | Refills: 1 | Status: SHIPPED | OUTPATIENT
Start: 2024-01-17 | End: 2024-05-07 | Stop reason: SDUPTHER

## 2024-01-17 NOTE — PROGRESS NOTES
Collaborative Care (CoCM)  Progress Note    Type of Interaction: Virtual    Start Time: 3:30pm    End Time: 4:07pm    Appointment: Not Scheduled    Reason for Visit:   Chief Complaint   Patient presents with    Depression    Anxiety      Interval History / Patient Symptoms:     Sub opt     Interventions Provided: Psychoeducation, Acceptance & Commitment Therapy, Motivational Interviewing, Strengths Exploration, Grief Work, and Develop Coping Strategies    Progress Made: Moderate    Response to Intervention: Patient shared that she felt more productive prior to the holidays, accomplished goals set for her brothers needs. Reported spending the holidays in [place], felt her stay was too long. Increased isolation and decreased motivation since returning to [place]. Processed barriers to engaging in journaling and grief support groups. Avoidance. No journaling, per patients report this was helping in the past. Family/friends have offered to attend group with patient. Open to increasing her medication with the hope that her motivation to complete tasks improves. Encouraged spending time with positive supports and engaging in healthy activities.     Plan: Patient experiencing an increase in depression/grief post holidays     Patient Instructions   Patient is scheduled for virtual follow up on 2/26 @ 11:30am    Follow Up / Next Appointment: Next appointment: 02/26/24

## 2024-01-17 NOTE — PROGRESS NOTES
After meeting with patient today she is expressing interest in increasing her medications. Currently taking Sertraline 75mg. Holidays have been more difficult and the anniversary of her daughters death is approaching. I am hoping that she will link with grief support groups prior to our next appt. Please advise on increase. She is scheduled with you again 2/12.

## 2024-01-31 ENCOUNTER — DOCUMENTATION (OUTPATIENT)
Dept: PRIMARY CARE | Facility: CLINIC | Age: 69
End: 2024-01-31
Payer: MEDICARE

## 2024-01-31 DIAGNOSIS — F43.21 GRIEF REACTION: Primary | ICD-10-CM

## 2024-01-31 PROCEDURE — 99493 SBSQ PSYC COLLAB CARE MGMT: CPT | Performed by: FAMILY MEDICINE

## 2024-02-12 ENCOUNTER — APPOINTMENT (OUTPATIENT)
Dept: PRIMARY CARE | Facility: CLINIC | Age: 69
End: 2024-02-12
Payer: MEDICARE

## 2024-02-26 ENCOUNTER — SOCIAL WORK (OUTPATIENT)
Dept: PRIMARY CARE | Facility: CLINIC | Age: 69
End: 2024-02-26
Payer: MEDICARE

## 2024-02-26 NOTE — PROGRESS NOTES
Collaborative Care (CoCM)  Progress Note    Type of Interaction: Virtual    Start Time: 11:29am    End Time: 12:00pm    Appointment: Not Scheduled    Reason for Visit:   Chief Complaint   Patient presents with    Depression    Anxiety      Interval History / Patient Symptoms:     Occ sub opt, less isolation. Reported feeling more productive     Interventions Provided: Psychoeducation, Acceptance & Commitment Therapy, Motivational Interviewing, Strengths Exploration, Grief Work, Review Progress/Goals Stress Management, Mindfulness, and Treatment Planning    Progress Made: Significant    Response to Intervention:     Patient shared that she feels better on the increase of her medication.   Reported so is spending time with her supports more often, talking with son more   Processed emotions associated with the anniversary of her daughter's passing approaching  Patient reported that she found a grief and loss group through her [employer] that she plans to attend on March 17th  Shared she is attempting to focus on what she can control vs what she cannot. Joined weight watchers again, had positive results in the past.   Encouraged use of coping strategies such as journaling, eating balanced diet, exercise/walking, and connecting with positive supports    Plan: Patient reported stability on current medications and overall improvement in mood. Open to CoCM closure at this time.     Patient Instructions   Patient is being closed from CoCM services at this time. If additional support needs arise in the future, please refer back to programing.     Follow Up / Next Appointment:  if symptoms worsen or fail to improve

## 2024-02-26 NOTE — PATIENT INSTRUCTIONS
Patient is being closed from [facility] services at this time. If additional support needs arise in the future, please refer back to programing.

## 2024-02-28 ENCOUNTER — DOCUMENTATION (OUTPATIENT)
Dept: PRIMARY CARE | Facility: CLINIC | Age: 69
End: 2024-02-28
Payer: MEDICARE

## 2024-02-28 DIAGNOSIS — F43.21 GRIEF REACTION: Primary | ICD-10-CM

## 2024-02-28 PROCEDURE — 99493 SBSQ PSYC COLLAB CARE MGMT: CPT | Performed by: FAMILY MEDICINE

## 2024-02-29 ENCOUNTER — DOCUMENTATION (OUTPATIENT)
Dept: PRIMARY CARE | Facility: CLINIC | Age: 69
End: 2024-02-29
Payer: MEDICARE

## 2024-03-11 DIAGNOSIS — I25.2 HISTORY OF MI (MYOCARDIAL INFARCTION): ICD-10-CM

## 2024-03-11 DIAGNOSIS — I25.10 3-VESSEL CAD: ICD-10-CM

## 2024-03-11 DIAGNOSIS — F51.01 PRIMARY INSOMNIA: ICD-10-CM

## 2024-03-11 RX ORDER — TRAZODONE HYDROCHLORIDE 50 MG/1
50 TABLET ORAL NIGHTLY PRN
Qty: 30 TABLET | Refills: 0 | Status: SHIPPED | OUTPATIENT
Start: 2024-03-11 | End: 2024-04-22

## 2024-03-13 RX ORDER — LISINOPRIL 5 MG/1
5 TABLET ORAL DAILY
Qty: 90 TABLET | Refills: 0 | Status: SHIPPED | OUTPATIENT
Start: 2024-03-13

## 2024-04-22 DIAGNOSIS — F51.01 PRIMARY INSOMNIA: ICD-10-CM

## 2024-04-22 RX ORDER — TRAZODONE HYDROCHLORIDE 50 MG/1
50 TABLET ORAL NIGHTLY PRN
Qty: 30 TABLET | Refills: 0 | Status: SHIPPED | OUTPATIENT
Start: 2024-04-22 | End: 2024-05-07 | Stop reason: SDUPTHER

## 2024-05-07 ENCOUNTER — OFFICE VISIT (OUTPATIENT)
Dept: PRIMARY CARE | Facility: CLINIC | Age: 69
End: 2024-05-07
Payer: MEDICARE

## 2024-05-07 ENCOUNTER — LAB (OUTPATIENT)
Dept: LAB | Facility: LAB | Age: 69
End: 2024-05-07
Payer: MEDICARE

## 2024-05-07 VITALS
BODY MASS INDEX: 34.2 KG/M2 | OXYGEN SATURATION: 95 % | TEMPERATURE: 97.3 F | SYSTOLIC BLOOD PRESSURE: 126 MMHG | HEIGHT: 63 IN | HEART RATE: 68 BPM | RESPIRATION RATE: 16 BRPM | WEIGHT: 193 LBS | DIASTOLIC BLOOD PRESSURE: 70 MMHG

## 2024-05-07 DIAGNOSIS — I25.10 3-VESSEL CAD: ICD-10-CM

## 2024-05-07 DIAGNOSIS — F43.21 GRIEF REACTION: ICD-10-CM

## 2024-05-07 DIAGNOSIS — F51.01 PRIMARY INSOMNIA: ICD-10-CM

## 2024-05-07 DIAGNOSIS — Z12.31 ENCOUNTER FOR SCREENING MAMMOGRAM FOR MALIGNANT NEOPLASM OF BREAST: ICD-10-CM

## 2024-05-07 DIAGNOSIS — R73.9 HYPERGLYCEMIA: ICD-10-CM

## 2024-05-07 DIAGNOSIS — E78.5 HYPERLIPIDEMIA, UNSPECIFIED HYPERLIPIDEMIA TYPE: ICD-10-CM

## 2024-05-07 DIAGNOSIS — Z00.00 ENCOUNTER FOR MEDICARE ANNUAL WELLNESS EXAM: ICD-10-CM

## 2024-05-07 DIAGNOSIS — Z13.89 MULTIPHASIC SCREENING: ICD-10-CM

## 2024-05-07 DIAGNOSIS — F32.1 CURRENT MODERATE EPISODE OF MAJOR DEPRESSIVE DISORDER, UNSPECIFIED WHETHER RECURRENT (MULTI): ICD-10-CM

## 2024-05-07 DIAGNOSIS — Z71.89 EARLY INTERVENTION COUNSELING: ICD-10-CM

## 2024-05-07 DIAGNOSIS — E66.01 OBESITY, MORBID (MULTI): ICD-10-CM

## 2024-05-07 LAB
ALBUMIN SERPL BCP-MCNC: 4.3 G/DL (ref 3.4–5)
ALP SERPL-CCNC: 87 U/L (ref 33–136)
ALT SERPL W P-5'-P-CCNC: 22 U/L (ref 7–45)
ANION GAP SERPL CALC-SCNC: 12 MMOL/L (ref 10–20)
AST SERPL W P-5'-P-CCNC: 21 U/L (ref 9–39)
BASOPHILS # BLD AUTO: 0.03 X10*3/UL (ref 0–0.1)
BASOPHILS NFR BLD AUTO: 0.4 %
BILIRUB SERPL-MCNC: 0.3 MG/DL (ref 0–1.2)
BUN SERPL-MCNC: 36 MG/DL (ref 6–23)
CALCIUM SERPL-MCNC: 9.1 MG/DL (ref 8.6–10.3)
CHLORIDE SERPL-SCNC: 109 MMOL/L (ref 98–107)
CHOLEST SERPL-MCNC: 125 MG/DL (ref 0–199)
CHOLESTEROL/HDL RATIO: 3.1
CO2 SERPL-SCNC: 24 MMOL/L (ref 21–32)
CREAT SERPL-MCNC: 0.97 MG/DL (ref 0.5–1.05)
EGFRCR SERPLBLD CKD-EPI 2021: 64 ML/MIN/1.73M*2
EOSINOPHIL # BLD AUTO: 0.15 X10*3/UL (ref 0–0.7)
EOSINOPHIL NFR BLD AUTO: 2.2 %
ERYTHROCYTE [DISTWIDTH] IN BLOOD BY AUTOMATED COUNT: 13.5 % (ref 11.5–14.5)
GLUCOSE SERPL-MCNC: 91 MG/DL (ref 74–99)
HCT VFR BLD AUTO: 37.7 % (ref 36–46)
HDLC SERPL-MCNC: 40.9 MG/DL
HGB BLD-MCNC: 12.4 G/DL (ref 12–16)
IMM GRANULOCYTES # BLD AUTO: 0.01 X10*3/UL (ref 0–0.7)
IMM GRANULOCYTES NFR BLD AUTO: 0.1 % (ref 0–0.9)
LDLC SERPL CALC-MCNC: 65 MG/DL
LYMPHOCYTES # BLD AUTO: 1.99 X10*3/UL (ref 1.2–4.8)
LYMPHOCYTES NFR BLD AUTO: 29 %
MCH RBC QN AUTO: 31.1 PG (ref 26–34)
MCHC RBC AUTO-ENTMCNC: 32.9 G/DL (ref 32–36)
MCV RBC AUTO: 95 FL (ref 80–100)
MONOCYTES # BLD AUTO: 0.48 X10*3/UL (ref 0.1–1)
MONOCYTES NFR BLD AUTO: 7 %
NEUTROPHILS # BLD AUTO: 4.2 X10*3/UL (ref 1.2–7.7)
NEUTROPHILS NFR BLD AUTO: 61.3 %
NON HDL CHOLESTEROL: 84 MG/DL (ref 0–149)
NRBC BLD-RTO: 0 /100 WBCS (ref 0–0)
PLATELET # BLD AUTO: 201 X10*3/UL (ref 150–450)
POTASSIUM SERPL-SCNC: 4.1 MMOL/L (ref 3.5–5.3)
PROT SERPL-MCNC: 6.6 G/DL (ref 6.4–8.2)
RBC # BLD AUTO: 3.99 X10*6/UL (ref 4–5.2)
SODIUM SERPL-SCNC: 141 MMOL/L (ref 136–145)
T4 FREE SERPL-MCNC: 0.68 NG/DL (ref 0.61–1.12)
TRIGL SERPL-MCNC: 98 MG/DL (ref 0–149)
TSH SERPL-ACNC: 1.96 MIU/L (ref 0.44–3.98)
VLDL: 20 MG/DL (ref 0–40)
WBC # BLD AUTO: 6.9 X10*3/UL (ref 4.4–11.3)

## 2024-05-07 PROCEDURE — 1170F FXNL STATUS ASSESSED: CPT | Performed by: FAMILY MEDICINE

## 2024-05-07 PROCEDURE — 3008F BODY MASS INDEX DOCD: CPT | Performed by: FAMILY MEDICINE

## 2024-05-07 PROCEDURE — 84439 ASSAY OF FREE THYROXINE: CPT

## 2024-05-07 PROCEDURE — 99497 ADVNCD CARE PLAN 30 MIN: CPT | Performed by: FAMILY MEDICINE

## 2024-05-07 PROCEDURE — 85025 COMPLETE CBC W/AUTO DIFF WBC: CPT

## 2024-05-07 PROCEDURE — G0447 BEHAVIOR COUNSEL OBESITY 15M: HCPCS | Performed by: FAMILY MEDICINE

## 2024-05-07 PROCEDURE — 84443 ASSAY THYROID STIM HORMONE: CPT

## 2024-05-07 PROCEDURE — 36415 COLL VENOUS BLD VENIPUNCTURE: CPT

## 2024-05-07 PROCEDURE — G0444 DEPRESSION SCREEN ANNUAL: HCPCS | Performed by: FAMILY MEDICINE

## 2024-05-07 PROCEDURE — 1036F TOBACCO NON-USER: CPT | Performed by: FAMILY MEDICINE

## 2024-05-07 PROCEDURE — 1159F MED LIST DOCD IN RCRD: CPT | Performed by: FAMILY MEDICINE

## 2024-05-07 PROCEDURE — 99397 PER PM REEVAL EST PAT 65+ YR: CPT | Performed by: FAMILY MEDICINE

## 2024-05-07 PROCEDURE — 83036 HEMOGLOBIN GLYCOSYLATED A1C: CPT

## 2024-05-07 PROCEDURE — G0439 PPPS, SUBSEQ VISIT: HCPCS | Performed by: FAMILY MEDICINE

## 2024-05-07 PROCEDURE — 80061 LIPID PANEL: CPT

## 2024-05-07 PROCEDURE — 80053 COMPREHEN METABOLIC PANEL: CPT

## 2024-05-07 PROCEDURE — G0446 INTENS BEHAVE THER CARDIO DX: HCPCS | Performed by: FAMILY MEDICINE

## 2024-05-07 RX ORDER — TRAZODONE HYDROCHLORIDE 50 MG/1
50 TABLET ORAL NIGHTLY PRN
Qty: 90 TABLET | Refills: 1 | Status: SHIPPED | OUTPATIENT
Start: 2024-05-07

## 2024-05-07 RX ORDER — SERTRALINE HYDROCHLORIDE 100 MG/1
100 TABLET, FILM COATED ORAL DAILY
Qty: 90 TABLET | Refills: 1 | Status: SHIPPED | OUTPATIENT
Start: 2024-05-07

## 2024-05-07 ASSESSMENT — ENCOUNTER SYMPTOMS
DEPRESSION: 0
OCCASIONAL FEELINGS OF UNSTEADINESS: 0
LOSS OF SENSATION IN FEET: 0

## 2024-05-07 ASSESSMENT — PATIENT HEALTH QUESTIONNAIRE - PHQ9
SUM OF ALL RESPONSES TO PHQ9 QUESTIONS 1 AND 2: 0
1. LITTLE INTEREST OR PLEASURE IN DOING THINGS: NOT AT ALL
2. FEELING DOWN, DEPRESSED OR HOPELESS: NOT AT ALL

## 2024-05-07 ASSESSMENT — ACTIVITIES OF DAILY LIVING (ADL)
DOING_HOUSEWORK: INDEPENDENT
DRESSING: INDEPENDENT
TAKING_MEDICATION: INDEPENDENT
BATHING: INDEPENDENT
MANAGING_FINANCES: INDEPENDENT
GROCERY_SHOPPING: INDEPENDENT

## 2024-05-07 NOTE — PROGRESS NOTES
Covid vax: UTD  Flu: UTD  Pneumo: UTD  RSV: UTD  Shingles: UTD    CRC: due 2026  Mammogram: 6/2023-ordered  Pap: 5/2023  Lmp: n/a

## 2024-05-07 NOTE — PROGRESS NOTES
Subjective   Reason for Visit: Mikaela Cárdenas is a 68 y.o. female here for a Medicare Wellness visit.   Covid vax: UTD  Flu: UTD  Pneumo: UTD  RSV: UTD  Shingles: UTD     CRC: due 2026  Mammogram: 6/2023-ordered  Pap: 5/2023  Lmp: n/a  CHECKLIST REVIEWED AND COMPLETE FOR [facility] Medicare Annual Wellness Visit Subsequent and Hyperlipidemia    Past Medical, Surgical, and Family History reviewed and updated in chart.    Reviewed all medications by prescribing practitioner or clinical pharmacist (such as prescriptions, OTCs, herbal therapies and supplements) and documented in the medical record.    HPI    Patient Self Assessment of Health Status  Patient Self Assessment: Good    Nutrition and Exercise:    Current Diet: HEALTHY Diet always best, minimizing excess carbs,   weight reduction advised if BMI not WNL, please maintain a NORMAL BMI 18.5-24.9    Adequate Fluid Intake: Yes  Caffeine: -aware to minimize intake, <300mg best to even take in LESS  Exercise Frequency: Regularly advised, weight bearing, strengthening, aerobic    Functional Ability/Level of Safety:  Home safety addressed: no active new concerns,   fall risk addressed  NO new hearing issues or concerns  Worth in ADLs addressed and areas of assistance if present -noted    ANY Cognitive Impairment Observed: No cognitive impairment observed    Home Safety Risk Factors: None    Patient Care Team:  Daija Spencer MD as PCP - General    HPI  Patient Active Problem List   Diagnosis    3-vessel CAD    Bruit of right carotid artery    Frequent UTI    Hematuria    Status post coronary artery bypass graft    History of ventricular fibrillation    Hyperlipidemia    Joint stiffness    Left flank pain    Uric acid nephrolithiasis    Obesity (BMI 35.0-39.9 without comorbidity)    History of MI (myocardial infarction)    Grief reaction    Obesity, morbid (Multi)      Past Surgical History:   Procedure Laterality Date    CERVICAL BIOPSY  2021    CERVICAL  "CONIZATION   W/ LASER  2021    COLONOSCOPY  2016    due 2026 (-)    CORONARY ANGIOPLASTY  2017    CORONARY STENT PLACEMENT  2017    x 4    EYE MUSCLE SURGERY  1960    x 2    FIBULA FRACTURE SURGERY Left 2012    TIBIA FRACTURE SURGERY Left 2012         PHQ2(-) No active depressed mood or not in crisis    Review of Systems:    NO Seizures  + CAD  NO CVA  Still grieving dtrs loss  No depression-just grief at present    This patient has   NO history of recent Covid nor flu symptoms,  NO Fever nor chills,  NO Chest pain, shortness of breath nor paroxysmal nocturnal dyspnea,  NO Nausea, vomiting, nor diarrhea,  NO Hematochezia nor melena,  NO Dysuria, hematuria, nor new incontinence issues  NO new severe headaches nor neurological complaints,  NO new issues with anxiety nor depression nor new psychiatric complaints,  NO suicidal nor homicidal ideations.     OBJECTIVE:  /70   Pulse 68   Temp 36.3 °C (97.3 °F) (Temporal)   Resp 16   Ht 1.6 m (5' 3\")   Wt 87.5 kg (193 lb)   LMP  (LMP Unknown)   SpO2 95%   BMI 34.19 kg/m²      General:  alert, oriented, no acute distress.  No obvious skin rashes noted.   No gait disturbance noted.    Mood is pleasant, not tearful, no signs of emotional distress.  Not appearing intoxicated or altered.   No voiced delusions,   Normal, appropriate behavior.    HEENT: Normocephalic, atraumatic,   Pupils round, reactive to light  Extraocular motions intact and wnl  Tympanic membranes normal    Neck: no nuchal rigidity  No masses palpable.  No carotid bruits.  No thyromegaly.    Respiratory: Equal breath sounds  No wheezes,    rales,    nor rhonchi  No respiratory distress.    Heart: Regular rate and rhythm, no    murmurs  no rubs/gallops    Abdomen: no masses palpable, no rebound nor guarding, no rebound nor guarding. overwt    Extremities: NO cyanosis noted, no clubbing.   No edema noted.  2+dorsalis pedis pulses.    Normal-not antalgic, steady gait.    No visits with results within " 3 Month(s) from this visit.   Latest known visit with results is:   Office Visit on 06/22/2023   Component Date Value Ref Range Status    POC Rapid Influenza A 06/22/2023 Negative  Negative Final    POC Rapid Influenza B 06/22/2023 Negative  Negative Final    SARS-CoV-2 Result 06/22/2023 NOT DETECTED  Not Detected Final    Comment: .  This assay is designed to detect the ORF1ab and/or S genes of SARS-CoV-2 via   nucleic acid amplification. A Not Detected result does not preclude   2019-nCoV infection since the adequacy of sample collection and/or low viral   burden may result in presence of viral nucleic acids below the clinical   sensitivity of this test method.     Fact sheet for providers: www.fda.gov/media/914218/download  Fact sheet for patients: www.fda.gov/media/751335/download    This test has received FDA Emergency Use Authorization (EUA) and has been   verified by [facility] ([facility]). This test   is only authorized for the duration of time that circumstances exist to   justify the authorization of the emergency use of in vitro diagnostic tests   for the detection of SARS-CoV-2 virus and/or diagnosis of COVID-19 infection   under section 564(b)(1) of the Act, 21 U.S.C. 360bbb-3(b)(1), unless the   authorization is terminated or revoked sooner.      [facility] is certified under CLIA-88 as   qualified to perform high complexity testing. Testing is performed in the   [facility] laboratories located at [address] [place] [address].        Assessment/Plan     Problem List Items Addressed This Visit       3-vessel CAD    Relevant Orders    CBC and Auto Differential    Comprehensive Metabolic Panel    Hemoglobin A1C    Lipid Panel    Thyroid Stimulating Hormone    Thyroxine, Free    Hyperlipidemia    Relevant Orders    CBC and Auto Differential    Comprehensive Metabolic Panel    Hemoglobin A1C    Lipid Panel     Thyroid Stimulating Hormone    Thyroxine, Free    Grief reaction    Relevant Medications    sertraline (Zoloft) 100 mg tablet    Other Relevant Orders    CBC and Auto Differential    Comprehensive Metabolic Panel    Hemoglobin A1C    Lipid Panel    Thyroid Stimulating Hormone    Thyroxine, Free     Other Visit Diagnoses       BMI 34.0-34.9,adult    -  Primary    Relevant Orders    CBC and Auto Differential    Comprehensive Metabolic Panel    Hemoglobin A1C    Lipid Panel    Thyroid Stimulating Hormone    Thyroxine, Free    Encounter for Medicare annual wellness exam        Relevant Orders    CBC and Auto Differential    Comprehensive Metabolic Panel    Hemoglobin A1C    Lipid Panel    Thyroid Stimulating Hormone    Thyroxine, Free    Encounter for screening mammogram for malignant neoplasm of breast        Relevant Orders    BI mammo bilateral screening tomosynthesis    CBC and Auto Differential    Comprehensive Metabolic Panel    Hemoglobin A1C    Lipid Panel    Thyroid Stimulating Hormone    Thyroxine, Free    Primary insomnia        Relevant Medications    traZODone (Desyrel) 50 mg tablet    Other Relevant Orders    CBC and Auto Differential    Comprehensive Metabolic Panel    Hemoglobin A1C    Lipid Panel    Thyroid Stimulating Hormone    Thyroxine, Free    Multiphasic screening        Relevant Orders    CBC and Auto Differential    Comprehensive Metabolic Panel    Hemoglobin A1C    Lipid Panel    Thyroid Stimulating Hormone    Thyroxine, Free    Early intervention counseling        Relevant Orders    CBC and Auto Differential    Comprehensive Metabolic Panel    Hemoglobin A1C    Lipid Panel    Thyroid Stimulating Hormone    Thyroxine, Free    Hyperglycemia        Relevant Orders    Hemoglobin A1C          Advance Care Planning Note   Discussion Date: 05/07/24   Discussion Participants: patient    The patient wishes to discuss Advance Care Planning today and the following is a brief summary of our discussion.      Patient has capacity to make their own medical decisions: Yes  Health Care Agent/Surrogate Decision Maker documented in chart: Yes    Documents on file and valid:  Advance Directive/Living Will: Yes   Health Care Power of : Yes   Communication of Medical Status/Prognosis:   yes   Communication of Treatment Goals/Options:   yes  Treatment Decisions/involved with patient today  yes  Time Statement: Total face to face time spent on advance care planning was <30 minutes with <30 minutes spent in counseling, including the explanation.    SEE ME AT NEXT REGULARLY SCHEDULED VISIT-sooner if condition deteriorates or new problems arise.    PATIENT WOULD LIKE TO BE A FULL CODE    NO uncontrolled DEPRESSION noted    NO EVIDENCE OF SIGNIFICANT DEMENTIA    Signs and symptoms of concern with depression-if in crisis -(no current HI/SI) will let us know and proceed to ER   Signs/symptoms of concern with dementia-and need to contact us if they occur discussed.    ASCVD est high  %   addressed and risk reduction conversation took place  Sees dr boggs in June  Sees cardio  Rare use xanax only   All above counseling approximately >15-20 minutes in conversation/documentation etc    QUESTIONS answered  Some minor issues rare-1-3/yr when diarrhea it leaks-advised bulking agent-fiber-offered GI  Declines for now    Next visit addressed -regular visit as scheduled and follow up sooner if condition deterioration or new problems arise.    This completes Mikaela Cárdenas ANNUAL MEDICARE WELLNESS VISIT today.  If no other follow ups discussed: Please follow up in 1 year for NEXT ANNUAL MEDICARE WELLNESS VISIT.    Daija Spencer MD    This documentation is subject to inadvertent typing and other similar clerical/grammatic errors etc.

## 2024-05-08 LAB
EST. AVERAGE GLUCOSE BLD GHB EST-MCNC: 103 MG/DL
HBA1C MFR BLD: 5.2 %

## 2024-05-17 ENCOUNTER — APPOINTMENT (OUTPATIENT)
Dept: CARDIOLOGY | Facility: CLINIC | Age: 69
End: 2024-05-17
Payer: MEDICARE

## 2024-05-24 DIAGNOSIS — I25.10 3-VESSEL CAD: ICD-10-CM

## 2024-05-24 DIAGNOSIS — I25.2 HISTORY OF MI (MYOCARDIAL INFARCTION): ICD-10-CM

## 2024-05-24 RX ORDER — METOPROLOL SUCCINATE 25 MG/1
25 TABLET, EXTENDED RELEASE ORAL DAILY
Qty: 90 TABLET | Refills: 0 | Status: SHIPPED | OUTPATIENT
Start: 2024-05-24

## 2024-06-07 PROBLEM — E66.01 OBESITY, MORBID (MULTI): Status: RESOLVED | Noted: 2023-10-09 | Resolved: 2024-06-07

## 2024-06-07 PROBLEM — Z01.419 NORMAL GYNECOLOGIC EXAMINATION: Status: ACTIVE | Noted: 2024-06-07

## 2024-06-07 PROBLEM — M85.852 OSTEOPENIA OF NECKS OF BOTH FEMURS: Status: ACTIVE | Noted: 2024-06-07

## 2024-06-07 PROBLEM — R31.9 HEMATURIA: Status: RESOLVED | Noted: 2023-03-31 | Resolved: 2024-06-07

## 2024-06-07 PROBLEM — M85.851 OSTEOPENIA OF NECKS OF BOTH FEMURS: Status: ACTIVE | Noted: 2024-06-07

## 2024-06-10 ENCOUNTER — OFFICE VISIT (OUTPATIENT)
Dept: OBSTETRICS AND GYNECOLOGY | Facility: CLINIC | Age: 69
End: 2024-06-10
Payer: MEDICARE

## 2024-06-10 VITALS
WEIGHT: 193 LBS | DIASTOLIC BLOOD PRESSURE: 90 MMHG | SYSTOLIC BLOOD PRESSURE: 148 MMHG | BODY MASS INDEX: 34.2 KG/M2 | HEIGHT: 63 IN

## 2024-06-10 DIAGNOSIS — D06.9 HIGH GRADE SQUAMOUS INTRAEPITHELIAL LESION (HGSIL), GRADE 3 CIN, ON BIOPSY OF CERVIX: ICD-10-CM

## 2024-06-10 DIAGNOSIS — M85.852 OSTEOPENIA OF NECKS OF BOTH FEMURS: ICD-10-CM

## 2024-06-10 DIAGNOSIS — Z91.89 GYN EXAM FOR HIGH-RISK MEDICARE PATIENT: Primary | ICD-10-CM

## 2024-06-10 DIAGNOSIS — M85.851 OSTEOPENIA OF NECKS OF BOTH FEMURS: ICD-10-CM

## 2024-06-10 PROCEDURE — 99397 PER PM REEVAL EST PAT 65+ YR: CPT | Performed by: OBSTETRICS & GYNECOLOGY

## 2024-06-10 PROCEDURE — 1159F MED LIST DOCD IN RCRD: CPT | Performed by: OBSTETRICS & GYNECOLOGY

## 2024-06-10 PROCEDURE — 87624 HPV HI-RISK TYP POOLED RSLT: CPT

## 2024-06-10 PROCEDURE — 3008F BODY MASS INDEX DOCD: CPT | Performed by: OBSTETRICS & GYNECOLOGY

## 2024-06-10 PROCEDURE — 1036F TOBACCO NON-USER: CPT | Performed by: OBSTETRICS & GYNECOLOGY

## 2024-06-10 NOTE — PROGRESS NOTES
"GYNECOLOGY PROGRESS NOTE      CC:    Chief Complaint   Patient presents with    Annual Exam     Est patient - annual exam with pap test  Pap 5/2023 neg w/HPVneg  Mamm 6/2023 scat fibro tissue - has another one scheduled  Dexa 6/2023 osteopenia  Colon 2016  Chaperone luisa alicia ma        HPI:  Mikaela Cárdenas is here for a routine GYN examination.  No GYN c/o, no AUB.        Depression screen:  negative  Fall screen:  negative  DV screen:  negative      ROS:  GI - no blood in BMs  URO - no hematuria  GYN - no AUB or vaginal discharge  PSYCH - mood OK        PHYSICAL EXAM:  /90 (BP Location: Left arm, Patient Position: Sitting)   Ht 1.6 m (5' 3\")   Wt 87.5 kg (193 lb)   LMP  (LMP Unknown)   BMI 34.19 kg/m²   GEN:  A&O, NAD  ABD:  NT/ND, soft, no palpable masses  URO:  atrophic urethral meatus, no bladder TTP  GYN:    -atrophic vulva w/o lesions or ulcers  -atrophic vagina without discharge or lesions  -normal cervix without lesions or discharge or CMT, + CKC scarring  -uterus NT/NE  -adnexa mobile and NT/NE  -perineum w/o lesions or ulcers  -rectal  no external hemorrhoids or lesions, internal exam deferred  BREAST:  no masses or TTP, no skin lesions or nipple discharge  PSYCH:  normal affect, non-anxious        IMPRESSION/PLAN:  Problem List Items Addressed This Visit       High grade squamous intraepithelial lesion (HGSIL), grade 3 SYED, on biopsy of cervix    Relevant Orders    THINPREP PAP TEST    Normal gynecologic examination - Primary    Osteopenia of necks of both femurs        HGSIL:  S/P LEEP for HGSIL/CIN3 4/2022 .  Pap/HPV wnl 11/2022 andf 5/2023.  Per ASCCP guidelines post-LEEP surveillance plan is repeat Pap/HPV testing yearly for 3 years (until 2025) then every 3 years after HGSIL diagnosis (2027) 6 months.  Repeat pap/HPV done today.  Smoking cessation:  n/a.  Next pap/HPV due in 1 year.    Mammogram up to date and normal.  Density is scattered.  Elects for screening mammograms yearly.    Colon " CA screening:  wnl in 2016.    DEXA scan in 2023 with osteopenia.  Osteoporosis risk factors= postmenopausal fracture.  Osteopenia:  2023 DEXA results with osteopenia reviewed with patient previously, T-score -2.1.  Osteoporosis risk factors= history of postmenopausal fracture.  FRAX calculator results=3.1% risk of hip fracture in next 10 years and 17.1% risk of any fracture in the next 10 years.  On Rx Fosamax as parents would not cover Prolia (per patient).  Repeat DEXA planned in 3 years--due in 2026.  If repeat DEXA shows osteoporosis or if has osteopenia with a FRAX calculator > 3%/20% then change in treatment is indicated--most likely to Prolia due to its bone-building properties.  Weight-bearing exercise, and avoidance of hazards around the home discussed.  Lack of beneficial effects for calcium/vitamin D supplementation discussed.      Follow-up in 1 year for next Medicare GYN examination, patient is high- risk and DOES have indication for annual GYN examination until 2025.        Jt Odell, DO

## 2024-06-24 LAB
CYTOLOGY CMNT CVX/VAG CYTO-IMP: NORMAL
HPV HR 12 DNA GENITAL QL NAA+PROBE: NEGATIVE
HPV HR GENOTYPES PNL CVX NAA+PROBE: NEGATIVE
HPV16 DNA SPEC QL NAA+PROBE: NEGATIVE
HPV18 DNA SPEC QL NAA+PROBE: NEGATIVE
LAB AP HPV GENOTYPE QUESTION: YES
LAB AP HPV HR: NORMAL
LAB AP PREVIOUS ABNORMAL HISTORY: NORMAL
LAB AP TREATMENT HISTORY: NORMAL
LABORATORY COMMENT REPORT: NORMAL
LMP START DATE: NORMAL
PATH REPORT.TOTAL CANCER: NORMAL

## 2024-07-08 ENCOUNTER — APPOINTMENT (OUTPATIENT)
Dept: RADIOLOGY | Facility: HOSPITAL | Age: 69
End: 2024-07-08
Payer: MEDICARE

## 2024-07-10 ENCOUNTER — HOSPITAL ENCOUNTER (OUTPATIENT)
Dept: RADIOLOGY | Facility: HOSPITAL | Age: 69
Discharge: HOME | End: 2024-07-10
Payer: MEDICARE

## 2024-07-10 VITALS — BODY MASS INDEX: 34.2 KG/M2 | HEIGHT: 63 IN | WEIGHT: 193 LBS

## 2024-07-10 DIAGNOSIS — Z12.31 ENCOUNTER FOR SCREENING MAMMOGRAM FOR MALIGNANT NEOPLASM OF BREAST: ICD-10-CM

## 2024-07-10 PROCEDURE — 77067 SCR MAMMO BI INCL CAD: CPT

## 2024-08-06 ENCOUNTER — TELEPHONE (OUTPATIENT)
Dept: PHARMACY | Facility: HOSPITAL | Age: 69
End: 2024-08-06
Payer: MEDICARE

## 2024-08-06 NOTE — TELEPHONE ENCOUNTER
Population Health: Outreach by Ambulatory Pharmacy Team    Payor: [employer] [place]  Reason: Adherence  Medication: Atorvastatin 80mg tablets  Outcome: Left Voicemail    Gladys James

## 2024-08-06 NOTE — TELEPHONE ENCOUNTER
I reviewed the telephone encounter and agree with the student’s findings and plans as written. Case discussed with student.    Francheska Chaves, PharmD

## 2024-08-16 ENCOUNTER — OFFICE VISIT (OUTPATIENT)
Dept: CARDIOLOGY | Facility: CLINIC | Age: 69
End: 2024-08-16
Payer: MEDICARE

## 2024-08-16 VITALS
BODY MASS INDEX: 33.66 KG/M2 | WEIGHT: 190 LBS | SYSTOLIC BLOOD PRESSURE: 108 MMHG | DIASTOLIC BLOOD PRESSURE: 70 MMHG | HEIGHT: 63 IN | HEART RATE: 60 BPM

## 2024-08-16 DIAGNOSIS — E78.2 MIXED HYPERLIPIDEMIA: ICD-10-CM

## 2024-08-16 DIAGNOSIS — I25.10 ASHD (ARTERIOSCLEROTIC HEART DISEASE): ICD-10-CM

## 2024-08-16 DIAGNOSIS — I25.10 3-VESSEL CAD: ICD-10-CM

## 2024-08-16 DIAGNOSIS — I25.2 HISTORY OF MI (MYOCARDIAL INFARCTION): ICD-10-CM

## 2024-08-16 PROCEDURE — 1159F MED LIST DOCD IN RCRD: CPT | Performed by: INTERNAL MEDICINE

## 2024-08-16 PROCEDURE — 1160F RVW MEDS BY RX/DR IN RCRD: CPT | Performed by: INTERNAL MEDICINE

## 2024-08-16 PROCEDURE — 3008F BODY MASS INDEX DOCD: CPT | Performed by: INTERNAL MEDICINE

## 2024-08-16 PROCEDURE — 1036F TOBACCO NON-USER: CPT | Performed by: INTERNAL MEDICINE

## 2024-08-16 PROCEDURE — 99213 OFFICE O/P EST LOW 20 MIN: CPT | Performed by: INTERNAL MEDICINE

## 2024-08-16 RX ORDER — LISINOPRIL 5 MG/1
5 TABLET ORAL DAILY
Qty: 90 TABLET | Refills: 3 | Status: SHIPPED | OUTPATIENT
Start: 2024-08-16 | End: 2025-08-16

## 2024-08-16 RX ORDER — ATORVASTATIN CALCIUM 80 MG/1
80 TABLET, FILM COATED ORAL DAILY
Qty: 90 TABLET | Refills: 3 | Status: SHIPPED | OUTPATIENT
Start: 2024-08-16

## 2024-08-16 RX ORDER — METOPROLOL SUCCINATE 25 MG/1
25 TABLET, EXTENDED RELEASE ORAL DAILY
Qty: 90 TABLET | Refills: 3 | Status: SHIPPED | OUTPATIENT
Start: 2024-08-16 | End: 2025-08-16

## 2024-08-16 NOTE — LETTER
August 16, 2024     Daija Spencer MD  [address]  [facility] [address]    Patient: Mikaela Cárdenas   YOB: 1955   Date of Visit: 8/16/2024       Dear Dr. Daija Spencer MD:    Thank you for referring Mikaela Cárdenas to me for evaluation. Below are my notes for this consultation.  If you have questions, please do not hesitate to call me. I look forward to following your patient along with you.       Sincerely,     Victor Manuel Lazaro, DO      CC: No Recipients  ______________________________________________________________________________________    Subjective   Mikaela Cárdenas is a 69 y.o. female       Chief Complaint    Annual Exam          69-year-old female here for annual follow-up and doing extremely well.  She denies any angina, nitrate use used, hospitalizations or recurrent cardiovascular events    Last year she underwent stress perfusion imaging that was completely normal with normal ejection fraction no evidence of scar or ischemia    Patient herself has sustained previous anterior MI with V-fib arrest in 2018 with primary revascularization of the LAD diagonal branch, and staged interventions of the first and second marginal branches and RCA details of which are reviewed. LV function is intact    She is now on weight watchers with good success, losing weight, she is hemodynamically stable and remains on appropriate GDMT.  Her primary care physician has ordered labs and will try to acquire her lipid panel otherwise follow-up in 1 year    She now lives in [place]; her primary care physician is at the new  primary care office on [address].  She is asking about consideration for changing cardiologist and we have given her a couple names of our  partners in [place].         Review of Systems   All other systems reviewed and are negative.           Vitals:    08/16/24 1433   BP: 108/70   BP Location: Left arm   Patient Position: Sitting   Pulse: 60   Weight: 86.2 kg (190 lb)  "  Height: 1.6 m (5' 3\")        Objective   Physical Exam  Constitutional:       Appearance: Normal appearance.   HENT:      Nose: Nose normal.   Neck:      Vascular: No carotid bruit.   Cardiovascular:      Rate and Rhythm: Normal rate.      Pulses: Normal pulses.      Heart sounds: Normal heart sounds.   Pulmonary:      Effort: Pulmonary effort is normal.   Abdominal:      General: Bowel sounds are normal.      Palpations: Abdomen is soft.   Musculoskeletal:         General: Normal range of motion.      Cervical back: Normal range of motion.      Right lower leg: No edema.      Left lower leg: No edema.   Skin:     General: Skin is warm and dry.   Neurological:      General: No focal deficit present.      Mental Status: She is alert.   Psychiatric:         Mood and Affect: Mood normal.         Behavior: Behavior normal.         Thought Content: Thought content normal.         Judgment: Judgment normal.         Allergies  Patient has no known allergies.     Current Medications    Current Outpatient Medications:   •  alendronate (Fosamax) 70 mg tablet, TAKE ONE TABLET BY MOUTH ONCE A WEEK 30 to 60 minutes before breakfast on an empty stomach, Disp: , Rfl:   •  ALPRAZolam (Xanax) 0.5 mg tablet, Take 1 tablet (0.5 mg) by mouth 2 times a day as needed for sleep., Disp: 30 tablet, Rfl: 1  •  aspirin 81 mg EC tablet, Take 1 tablet (81 mg) by mouth once daily., Disp: , Rfl:   •  atorvastatin (Lipitor) 80 mg tablet, TAKE ONE TABLET BY MOUTH EVERY DAY, Disp: 90 tablet, Rfl: 3  •  lisinopril 5 mg tablet, TAKE ONE TABLET BY MOUTH EVERY DAY, Disp: 90 tablet, Rfl: 0  •  metoprolol succinate XL (Toprol-XL) 25 mg 24 hr tablet, TAKE ONE TABLET BY MOUTH once DAILY, Disp: 90 tablet, Rfl: 0  •  nitroglycerin (Nitrostat) 0.4 mg SL tablet, Place 1 tablet (0.4 mg) under the tongue every 5 minutes if needed for chest pain. FOR CHEST PAIN, Disp: 25 tablet, Rfl: 0  •  sertraline (Zoloft) 100 mg tablet, Take 1 tablet (100 mg) by mouth once " daily., Disp: 90 tablet, Rfl: 1  •  traZODone (Desyrel) 50 mg tablet, Take 1 tablet (50 mg) by mouth as needed at bedtime for sleep., Disp: 90 tablet, Rfl: 1                     Assessment/Plan   1. 3-vessel CAD        2. History of MI (myocardial infarction)        3. BMI 33.0-33.9,adult        4. ASHD (arteriosclerotic heart disease)                 Scribe Attestation  By signing my name below, IJohanne LPN   , Scribe   attest that this documentation has been prepared under the direction and in the presence of Victor Manuel Lzaaro DO.     Provider Attestation - Scribe documentation    All medical record entries made by the Scribe were at my direction and personally dictated by me. I have reviewed the chart and agree that the record accurately reflects my personal performance of the history, physical exam, discussion and plan.

## 2024-08-16 NOTE — PROGRESS NOTES
"Subjective   Mikaela Cárdenas is a 69 y.o. female       Chief Complaint    Annual Exam          69-year-old female here for annual follow-up and doing extremely well.  She denies any angina, nitrate use used, hospitalizations or recurrent cardiovascular events    Last year she underwent stress perfusion imaging that was completely normal with normal ejection fraction no evidence of scar or ischemia    Patient herself has sustained previous anterior MI with V-fib arrest in 2018 with primary revascularization of the LAD diagonal branch, and staged interventions of the first and second marginal branches and RCA details of which are reviewed. LV function is intact    She is now on weight watchers with good success, losing weight, she is hemodynamically stable and remains on appropriate GDMT.  Her primary care physician has ordered labs and will try to acquire her lipid panel otherwise follow-up in 1 year    She now lives in [place]; her primary care physician is at the new  primary care office on [address].  She is asking about consideration for changing cardiologist and we have given her a couple names of our  partners in [place].         Review of Systems   All other systems reviewed and are negative.           Vitals:    08/16/24 1433   BP: 108/70   BP Location: Left arm   Patient Position: Sitting   Pulse: 60   Weight: 86.2 kg (190 lb)   Height: 1.6 m (5' 3\")        Objective   Physical Exam  Constitutional:       Appearance: Normal appearance.   HENT:      Nose: Nose normal.   Neck:      Vascular: No carotid bruit.   Cardiovascular:      Rate and Rhythm: Normal rate.      Pulses: Normal pulses.      Heart sounds: Normal heart sounds.   Pulmonary:      Effort: Pulmonary effort is normal.   Abdominal:      General: Bowel sounds are normal.      Palpations: Abdomen is soft.   Musculoskeletal:         General: Normal range of motion.      Cervical back: Normal range of motion.      Right lower leg: No edema.      " Left lower leg: No edema.   Skin:     General: Skin is warm and dry.   Neurological:      General: No focal deficit present.      Mental Status: She is alert.   Psychiatric:         Mood and Affect: Mood normal.         Behavior: Behavior normal.         Thought Content: Thought content normal.         Judgment: Judgment normal.         Allergies  Patient has no known allergies.     Current Medications    Current Outpatient Medications:     alendronate (Fosamax) 70 mg tablet, TAKE ONE TABLET BY MOUTH ONCE A WEEK 30 to 60 minutes before breakfast on an empty stomach, Disp: , Rfl:     ALPRAZolam (Xanax) 0.5 mg tablet, Take 1 tablet (0.5 mg) by mouth 2 times a day as needed for sleep., Disp: 30 tablet, Rfl: 1    aspirin 81 mg EC tablet, Take 1 tablet (81 mg) by mouth once daily., Disp: , Rfl:     atorvastatin (Lipitor) 80 mg tablet, TAKE ONE TABLET BY MOUTH EVERY DAY, Disp: 90 tablet, Rfl: 3    lisinopril 5 mg tablet, TAKE ONE TABLET BY MOUTH EVERY DAY, Disp: 90 tablet, Rfl: 0    metoprolol succinate XL (Toprol-XL) 25 mg 24 hr tablet, TAKE ONE TABLET BY MOUTH once DAILY, Disp: 90 tablet, Rfl: 0    nitroglycerin (Nitrostat) 0.4 mg SL tablet, Place 1 tablet (0.4 mg) under the tongue every 5 minutes if needed for chest pain. FOR CHEST PAIN, Disp: 25 tablet, Rfl: 0    sertraline (Zoloft) 100 mg tablet, Take 1 tablet (100 mg) by mouth once daily., Disp: 90 tablet, Rfl: 1    traZODone (Desyrel) 50 mg tablet, Take 1 tablet (50 mg) by mouth as needed at bedtime for sleep., Disp: 90 tablet, Rfl: 1                     Assessment/Plan   1. 3-vessel CAD        2. History of MI (myocardial infarction)        3. BMI 33.0-33.9,adult        4. ASHD (arteriosclerotic heart disease)                 Scribe Attestation  By signing my name below, Johanne LOMELI LPN   , Scribe   attest that this documentation has been prepared under the direction and in the presence of Victor Manuel Lazaro DO.     Provider Attestation - Scribe documentation    All  medical record entries made by the Scribe were at my direction and personally dictated by me. I have reviewed the chart and agree that the record accurately reflects my personal performance of the history, physical exam, discussion and plan.

## 2024-08-20 ENCOUNTER — APPOINTMENT (OUTPATIENT)
Dept: CARDIOLOGY | Facility: CLINIC | Age: 69
End: 2024-08-20
Payer: MEDICARE

## 2024-08-26 ENCOUNTER — TELEPHONE (OUTPATIENT)
Dept: PRIMARY CARE | Facility: CLINIC | Age: 69
End: 2024-08-26
Payer: MEDICARE

## 2024-08-26 NOTE — TELEPHONE ENCOUNTER
Patient would like a call back she said she tested positive for covid on 08/15/24.  She said all her symptoms are gone except for low grade fever.  She wants to ask MA question about it.

## 2024-10-09 ENCOUNTER — TELEPHONE (OUTPATIENT)
Dept: PRIMARY CARE | Facility: CLINIC | Age: 69
End: 2024-10-09
Payer: MEDICARE

## 2024-10-09 ENCOUNTER — OFFICE VISIT (OUTPATIENT)
Dept: URGENT CARE | Age: 69
End: 2024-10-09
Payer: MEDICARE

## 2024-10-09 VITALS
TEMPERATURE: 98.2 F | OXYGEN SATURATION: 99 % | RESPIRATION RATE: 18 BRPM | WEIGHT: 182 LBS | SYSTOLIC BLOOD PRESSURE: 120 MMHG | HEART RATE: 96 BPM | BODY MASS INDEX: 32.24 KG/M2 | DIASTOLIC BLOOD PRESSURE: 65 MMHG

## 2024-10-09 DIAGNOSIS — R50.9 FEVER, UNSPECIFIED FEVER CAUSE: ICD-10-CM

## 2024-10-09 LAB
POC RAPID INFLUENZA A: NEGATIVE
POC RAPID INFLUENZA B: NEGATIVE
POC SARS-COV-2 AG BINAX: NORMAL

## 2024-10-09 ASSESSMENT — ENCOUNTER SYMPTOMS
SINUS PRESSURE: 0
COUGH: 0
CHILLS: 1
EYE DISCHARGE: 0
FEVER: 1
SORE THROAT: 0
HEADACHES: 1
EYE REDNESS: 0
SINUS PAIN: 0
SHORTNESS OF BREATH: 0
CHEST TIGHTNESS: 0
WHEEZING: 0
EYE PAIN: 0

## 2024-10-09 NOTE — PROGRESS NOTES
Subjective   Patient ID: Mikaela Cárdenas is a 69 y.o. female. They present today with a chief complaint of Illness (X1 low grade fever, felt hot, then 104 fever, headache.).    History of Present Illness  Patient reports that she has had a fever with a Tmax of 103.4 F and chills that began yesterday afternoon.  Patient complains of a generalized HA.      History provided by:  Patient   used: No    Illness  Associated symptoms: fever and headaches    Associated symptoms: no chest pain, no cough, no ear pain, no shortness of breath, no sore throat and no wheezing        Past Medical History  Allergies as of 10/09/2024    (No Known Allergies)       (Not in a hospital admission)       Past Medical History:   Diagnosis Date    Distal radius fracture     History of cervical dysplasia 2021    [facility] for HGSIL/CIN3    History of mammogram 06/06/2023    cat 1    History of radius fracture     Old myocardial infarction     Pap test, as part of routine gynecological examination 05/2023    wnl, hpv neg--11/2022 wnl, hpv neg       Past Surgical History:   Procedure Laterality Date    CERVICAL BIOPSY  2021    CERVICAL CONIZATION   W/ LASER  2021    NOT LASER    COLONOSCOPY  2016    due 2026 (-)    COLPOSCOPY  2021    CORONARY ANGIOPLASTY  2017    CORONARY STENT PLACEMENT  2017    x 4    EYE MUSCLE SURGERY  1960    x 2    FIBULA FRACTURE SURGERY Left 2012    TIBIA FRACTURE SURGERY Left 2012        reports that she has never smoked. She has never been exposed to tobacco smoke. She has never used smokeless tobacco. She reports current alcohol use. She reports that she does not use drugs.    Review of Systems  Review of Systems   Constitutional:  Positive for chills and fever.   HENT:  Negative for ear pain, sinus pressure, sinus pain and sore throat.    Eyes:  Negative for pain, discharge and redness.   Respiratory:  Negative for cough, chest tightness, shortness of breath and wheezing.    Cardiovascular:  Negative  for chest pain.   Neurological:  Positive for headaches.                                  Objective    Vitals:    10/09/24 1146   BP: 120/65   Pulse: 96   Resp: 18   Temp: 36.8 °C (98.2 °F)   SpO2: 99%   Weight: 82.6 kg (182 lb)     No LMP recorded (lmp unknown). Patient is postmenopausal.    Physical Exam  Vitals reviewed.   Constitutional:       General: She is not in acute distress.     Appearance: She is not ill-appearing.   HENT:      Right Ear: Tympanic membrane and ear canal normal.      Left Ear: Tympanic membrane and ear canal normal.      Nose: Nose normal.      Mouth/Throat:      Mouth: Mucous membranes are moist.      Pharynx: No posterior oropharyngeal erythema.   Eyes:      Extraocular Movements: Extraocular movements intact.      Conjunctiva/sclera: Conjunctivae normal.      Pupils: Pupils are equal, round, and reactive to light.   Cardiovascular:      Rate and Rhythm: Normal rate and regular rhythm.      Heart sounds: No murmur heard.     No friction rub.   Pulmonary:      Effort: Pulmonary effort is normal. No respiratory distress.      Breath sounds: No wheezing, rhonchi or rales.   Lymphadenopathy:      Cervical: No cervical adenopathy.   Neurological:      Mental Status: She is alert.         Procedures    Point of Care Test & Imaging Results from this visit  Results for orders placed or performed in visit on 10/09/24   POCT Covid-19 Rapid Antigen   Result Value Ref Range    POC MICHA-COV-2 AG  Presumptive negative test for SARS-CoV-2 (no antigen detected)     Presumptive negative test for SARS-CoV-2 (no antigen detected)   POCT Influenza A/B manually resulted   Result Value Ref Range    POC Rapid Influenza A Negative Negative    POC Rapid Influenza B Negative Negative      No results found.    Diagnostic study results (if any) were reviewed by Willard Quintero DO.    Assessment/Plan   Allergies, medications, history, and pertinent labs/EKGs/Imaging reviewed by Willard Quintero DO.     Orders and  Diagnoses  Diagnoses and all orders for this visit:  Fever, unspecified fever cause  -     POCT Covid-19 Rapid Antigen  -     POCT Influenza A/B manually resulted      Medical Admin Record      Patient disposition: Home    Electronically signed by Willard Quintero DO  12:28 PM

## 2024-10-09 NOTE — TELEPHONE ENCOUNTER
Pt reports that her fever is climbing again. I let her know that we recommend ER. I also explained that a high fever can cause damage to the body. Pt wanted to know what she was sick with, and I explained that we have no way of knowing and a fever is indicative of the body fighting something, an illness, a virus, a bacteria, an infection but the ER will likely do blood work and other testing. Pt agreed to go to ER.

## 2024-10-10 ENCOUNTER — TELEPHONE (OUTPATIENT)
Dept: OBSTETRICS AND GYNECOLOGY | Facility: CLINIC | Age: 69
End: 2024-10-10
Payer: MEDICARE

## 2024-10-10 DIAGNOSIS — M85.852 OSTEOPENIA OF NECKS OF BOTH FEMURS: ICD-10-CM

## 2024-10-10 DIAGNOSIS — M85.851 OSTEOPENIA OF NECKS OF BOTH FEMURS: ICD-10-CM

## 2024-10-11 RX ORDER — ALENDRONATE SODIUM 70 MG/1
70 TABLET ORAL
Qty: 30 TABLET | Refills: 3 | Status: SHIPPED | OUTPATIENT
Start: 2024-10-11

## 2024-10-13 ENCOUNTER — OFFICE VISIT (OUTPATIENT)
Dept: URGENT CARE | Age: 69
End: 2024-10-13
Payer: MEDICARE

## 2024-10-13 VITALS
BODY MASS INDEX: 31.54 KG/M2 | RESPIRATION RATE: 16 BRPM | HEIGHT: 63 IN | HEART RATE: 87 BPM | WEIGHT: 178 LBS | OXYGEN SATURATION: 98 % | SYSTOLIC BLOOD PRESSURE: 148 MMHG | DIASTOLIC BLOOD PRESSURE: 74 MMHG | TEMPERATURE: 98.7 F

## 2024-10-13 DIAGNOSIS — N30.01 ACUTE CYSTITIS WITH HEMATURIA: Primary | ICD-10-CM

## 2024-10-13 DIAGNOSIS — R35.0 URINARY FREQUENCY: ICD-10-CM

## 2024-10-13 LAB
POC APPEARANCE, URINE: ABNORMAL
POC BILIRUBIN, URINE: NEGATIVE
POC BLOOD, URINE: ABNORMAL
POC COLOR, URINE: YELLOW
POC GLUCOSE, URINE: NEGATIVE MG/DL
POC KETONES, URINE: NEGATIVE MG/DL
POC LEUKOCYTES, URINE: ABNORMAL
POC NITRITE,URINE: POSITIVE
POC PH, URINE: 6 PH
POC PROTEIN, URINE: ABNORMAL MG/DL
POC SPECIFIC GRAVITY, URINE: 1.02
POC UROBILINOGEN, URINE: 0.2 EU/DL

## 2024-10-13 PROCEDURE — 81003 URINALYSIS AUTO W/O SCOPE: CPT | Performed by: PHYSICIAN ASSISTANT

## 2024-10-13 PROCEDURE — 87186 SC STD MICRODIL/AGAR DIL: CPT

## 2024-10-13 PROCEDURE — 1126F AMNT PAIN NOTED NONE PRSNT: CPT | Performed by: PHYSICIAN ASSISTANT

## 2024-10-13 PROCEDURE — 3008F BODY MASS INDEX DOCD: CPT | Performed by: PHYSICIAN ASSISTANT

## 2024-10-13 PROCEDURE — 99214 OFFICE O/P EST MOD 30 MIN: CPT | Performed by: PHYSICIAN ASSISTANT

## 2024-10-13 PROCEDURE — 87086 URINE CULTURE/COLONY COUNT: CPT

## 2024-10-13 RX ORDER — SULFAMETHOXAZOLE AND TRIMETHOPRIM 800; 160 MG/1; MG/1
1 TABLET ORAL 2 TIMES DAILY
Qty: 14 TABLET | Refills: 0 | Status: SHIPPED | OUTPATIENT
Start: 2024-10-13 | End: 2024-10-20

## 2024-10-13 ASSESSMENT — PAIN SCALES - GENERAL: PAINLEVEL: 0-NO PAIN

## 2024-10-13 NOTE — PROGRESS NOTES
"Subjective   Patient ID: Mikaela Cárdenas is a 69 y.o. female who presents for UTI, Fever, Headache, and Nausea (X 5 days).  HPI  Patient presents for evaluation of multiple symptoms.  Patient reports about 5 days of bodyaches, malaise, and eventual fever with a headache.  Patient was seen in a different urgent care several days ago and tested for flu and COVID.  Both were negative.  With further discussion, patient does admit to change in urine color with odor as well as increased urinary frequency.  No vomiting or other constitutional signs and symptoms.  No other complaints.    Review of Systems    Constitutional:  See HPI    Gastrointestinal: See HPI  Genitourinary: See HPI  Neurologic:  Alert and oriented X4, No numbness, No tingling.    All other systems are negative     Objective     /74 (BP Location: Left arm, Patient Position: Sitting)   Pulse 87   Temp 37.1 °C (98.7 °F) (Oral)   Resp 16   Ht 1.6 m (5' 3\")   Wt 80.7 kg (178 lb)   LMP  (LMP Unknown)   SpO2 98%   BMI 31.53 kg/m²     Physical Exam    General:  Alert and oriented, No acute distress.    Eye:  Pupils are equal, round and reactive to light, Normal conjunctiva.    HENT:  Normocephalic,   Neck:  Supple    Respiratory: Respirations are non-labored   Musculoskeletal: Normal ROM and strength  Integumentary:  Warm, Dry, Intact, No pallor, No rash.    Neurologic:  Alert, Oriented, Normal sensory, Cranial Nerves II-XII are grossly intact  Psychiatric:  Cooperative, Appropriate mood & affect.    Assessment/Plan   Urinalysis with blood, leukocyte esterase, and nitrites consistent with a UTI.  Prescription for Bactrim.  Urine sent for culture.  Patient's clinical presentation is otherwise unremarkable at this time. Patient is discharged with instructions to follow-up with primary care or seek emergency medical attention for worsening symptoms or any new concerns.  Problem List Items Addressed This Visit    None  Visit Diagnoses       Acute " cystitis with hematuria    -  Primary    Relevant Medications    sulfamethoxazole-trimethoprim (Bactrim DS) 800-160 mg tablet    Urinary frequency        Relevant Orders    POCT UA Automated manually resulted (Completed)            Final diagnoses:   [R35.0] Urinary frequency   [N30.01] Acute cystitis with hematuria

## 2024-10-15 LAB — BACTERIA UR CULT: ABNORMAL

## 2024-11-11 ENCOUNTER — APPOINTMENT (OUTPATIENT)
Dept: PRIMARY CARE | Facility: CLINIC | Age: 69
End: 2024-11-11
Payer: MEDICARE

## 2024-11-11 ENCOUNTER — LAB (OUTPATIENT)
Dept: LAB | Facility: LAB | Age: 69
End: 2024-11-11
Payer: MEDICARE

## 2024-11-11 VITALS
DIASTOLIC BLOOD PRESSURE: 74 MMHG | RESPIRATION RATE: 16 BRPM | SYSTOLIC BLOOD PRESSURE: 124 MMHG | WEIGHT: 184 LBS | OXYGEN SATURATION: 96 % | BODY MASS INDEX: 32.6 KG/M2 | HEART RATE: 68 BPM | TEMPERATURE: 97 F | HEIGHT: 63 IN

## 2024-11-11 DIAGNOSIS — E78.5 HYPERLIPIDEMIA, UNSPECIFIED HYPERLIPIDEMIA TYPE: ICD-10-CM

## 2024-11-11 DIAGNOSIS — I25.10 3-VESSEL CAD: ICD-10-CM

## 2024-11-11 DIAGNOSIS — R73.9 HYPERGLYCEMIA: ICD-10-CM

## 2024-11-11 DIAGNOSIS — F32.1 CURRENT MODERATE EPISODE OF MAJOR DEPRESSIVE DISORDER, UNSPECIFIED WHETHER RECURRENT (MULTI): ICD-10-CM

## 2024-11-11 DIAGNOSIS — F51.01 PRIMARY INSOMNIA: ICD-10-CM

## 2024-11-11 DIAGNOSIS — F43.21 GRIEF REACTION: ICD-10-CM

## 2024-11-11 DIAGNOSIS — Z86.79 HISTORY OF VENTRICULAR FIBRILLATION: ICD-10-CM

## 2024-11-11 DIAGNOSIS — R31.9 HEMATURIA, UNSPECIFIED TYPE: ICD-10-CM

## 2024-11-11 DIAGNOSIS — R31.9 HEMATURIA, UNSPECIFIED TYPE: Primary | ICD-10-CM

## 2024-11-11 LAB
ALBUMIN SERPL BCP-MCNC: 4.1 G/DL (ref 3.4–5)
ALP SERPL-CCNC: 91 U/L (ref 33–136)
ALT SERPL W P-5'-P-CCNC: 21 U/L (ref 7–45)
ANION GAP SERPL CALC-SCNC: 8 MMOL/L (ref 10–20)
AST SERPL W P-5'-P-CCNC: 15 U/L (ref 9–39)
BILIRUB SERPL-MCNC: 0.4 MG/DL (ref 0–1.2)
BUN SERPL-MCNC: 29 MG/DL (ref 6–23)
CALCIUM SERPL-MCNC: 8.8 MG/DL (ref 8.6–10.3)
CHLORIDE SERPL-SCNC: 111 MMOL/L (ref 98–107)
CO2 SERPL-SCNC: 28 MMOL/L (ref 21–32)
CREAT SERPL-MCNC: 0.94 MG/DL (ref 0.5–1.05)
EGFRCR SERPLBLD CKD-EPI 2021: 66 ML/MIN/1.73M*2
EST. AVERAGE GLUCOSE BLD GHB EST-MCNC: 108 MG/DL
GLUCOSE SERPL-MCNC: 93 MG/DL (ref 74–99)
HBA1C MFR BLD: 5.4 %
POC APPEARANCE, URINE: CLEAR
POC BILIRUBIN, URINE: NEGATIVE
POC BLOOD, URINE: ABNORMAL
POC COLOR, URINE: YELLOW
POC GLUCOSE, URINE: NEGATIVE MG/DL
POC KETONES, URINE: NEGATIVE MG/DL
POC LEUKOCYTES, URINE: ABNORMAL
POC NITRITE,URINE: POSITIVE
POC PH, URINE: 5.5 PH
POC PROTEIN, URINE: ABNORMAL MG/DL
POC SPECIFIC GRAVITY, URINE: 1.02
POC UROBILINOGEN, URINE: 0.2 EU/DL
POTASSIUM SERPL-SCNC: 4 MMOL/L (ref 3.5–5.3)
PROT SERPL-MCNC: 6.7 G/DL (ref 6.4–8.2)
PTH-INTACT SERPL-MCNC: 61.7 PG/ML (ref 18.5–88)
SODIUM SERPL-SCNC: 143 MMOL/L (ref 136–145)

## 2024-11-11 PROCEDURE — 36415 COLL VENOUS BLD VENIPUNCTURE: CPT

## 2024-11-11 PROCEDURE — 1159F MED LIST DOCD IN RCRD: CPT | Performed by: FAMILY MEDICINE

## 2024-11-11 PROCEDURE — 83036 HEMOGLOBIN GLYCOSYLATED A1C: CPT

## 2024-11-11 PROCEDURE — 3008F BODY MASS INDEX DOCD: CPT | Performed by: FAMILY MEDICINE

## 2024-11-11 PROCEDURE — 1036F TOBACCO NON-USER: CPT | Performed by: FAMILY MEDICINE

## 2024-11-11 PROCEDURE — 80053 COMPREHEN METABOLIC PANEL: CPT

## 2024-11-11 PROCEDURE — 99214 OFFICE O/P EST MOD 30 MIN: CPT | Performed by: FAMILY MEDICINE

## 2024-11-11 PROCEDURE — 83970 ASSAY OF PARATHORMONE: CPT

## 2024-11-11 PROCEDURE — 87186 SC STD MICRODIL/AGAR DIL: CPT

## 2024-11-11 PROCEDURE — 87086 URINE CULTURE/COLONY COUNT: CPT

## 2024-11-11 RX ORDER — NITROFURANTOIN 25; 75 MG/1; MG/1
100 CAPSULE ORAL 2 TIMES DAILY
Qty: 14 CAPSULE | Refills: 0 | Status: SHIPPED | OUTPATIENT
Start: 2024-11-11 | End: 2024-11-18

## 2024-11-11 RX ORDER — TRAZODONE HYDROCHLORIDE 50 MG/1
50 TABLET ORAL NIGHTLY PRN
Qty: 90 TABLET | Refills: 1 | Status: SHIPPED | OUTPATIENT
Start: 2024-11-11

## 2024-11-11 RX ORDER — SERTRALINE HYDROCHLORIDE 100 MG/1
100 TABLET, FILM COATED ORAL DAILY
Qty: 90 TABLET | Refills: 1 | Status: SHIPPED | OUTPATIENT
Start: 2024-11-11

## 2024-11-11 NOTE — PROGRESS NOTES
"Subjective   Patient ID: Mikaela Cárdenas is a 69 y.o. female who presents for Hyperlipidemia, Depression, and Anxiety.  Covid vax: x 4  Flu: advised  Pneumo: UTD  RSV: UTD  Shingles: UTD     CRC: due 2026  Mammogram: 7/2024  Pap: 5/2023  Lmp: n/a     HPI  Patient Active Problem List   Diagnosis    3-vessel CAD    Bruit of right carotid artery    Frequent UTI    Status post coronary artery bypass graft    History of ventricular fibrillation    Hyperlipidemia    Joint stiffness    Left flank pain    Uric acid nephrolithiasis    BMI 33.0-33.9,adult    History of MI (myocardial infarction)    Grief reaction    Current moderate episode of major depressive disorder, unspecified whether recurrent (Multi)    Normal gynecologic examination    Osteopenia of necks of both femurs    High grade squamous intraepithelial lesion (HGSIL), grade 3 SYED, on biopsy of cervix    ASHD (arteriosclerotic heart disease)       Past Surgical History:   Procedure Laterality Date    CERVICAL BIOPSY  2021    CERVICAL CONIZATION   W/ LASER  2021    NOT LASER    COLONOSCOPY  2016    due 2026 (-)    COLPOSCOPY  2021    CORONARY ANGIOPLASTY  2017    CORONARY STENT PLACEMENT  2017    x 4    EYE MUSCLE SURGERY  1960    x 2    FIBULA FRACTURE SURGERY Left 2012    TIBIA FRACTURE SURGERY Left 2012       Review of Systems  This patient has  NO history of seizures/ +CAD no CVA    NO history of recent Covid nor flu symptoms,  NO Fever nor chills,  NO Chest pain, shortness of breath nor paroxysmal nocturnal dyspnea,  NO Nausea, vomiting, nor diarrhea,  NO Hematochezia nor melena,  NO Dysuria, hematuria, nor new incontinence issues  NO new severe headaches nor neurological complaints,  NO new issues with anxiety nor depression nor new psychiatric complaints,  NO suicidal nor homicidal ideations.   Is going away for holidays  Still grieving    OBJECTIVE:  /74   Pulse 68   Temp 36.1 °C (97 °F) (Temporal)   Resp 16   Ht 1.6 m (5' 3\")   Wt 83.5 kg (184 " lb)   LMP  (LMP Unknown)   SpO2 96%   BMI 32.59 kg/m²      General:  alert, oriented, no acute distress.  No obvious skin rashes noted.   No gait disturbance noted.    Mood is pleasant,  no signs of emotional distress.   Not appearing intoxicated or altered.   No voiced delusions,   Normal, appropriate behavior.    HEENT: Normocephalic, atraumatic,   Pupils round, reactive to light  Extraocular motions intact and wnl  Tympanic membranes normal    Neck: no nuchal rigidity  No masses palpable.  No carotid bruits.  No thyromegaly.    Respiratory: Equal breath sounds  No wheezes,    rales,    nor rhonchi  No respiratory distress.    Heart: Regular rate and rhythm, no    murmurs  no rubs/gallops    Abdomen: no masses palpable, nontender, no rebound nor guarding.    Extremities: NO cyanosis noted, no clubbing.   No edema noted.  2+dorsalis pedis pulses.    Normal-not antalgic, steady gait.    Office Visit on 10/13/2024   Component Date Value Ref Range Status    POC Color, Urine 10/13/2024 Yellow  Straw, Yellow, Light-Yellow Final    POC Appearance, Urine 10/13/2024 Cloudy (A)  Clear Final    POC Glucose, Urine 10/13/2024 NEGATIVE  NEGATIVE mg/dl Final    POC Bilirubin, Urine 10/13/2024 NEGATIVE  NEGATIVE Final    POC Ketones, Urine 10/13/2024 NEGATIVE  NEGATIVE mg/dl Final    POC Specific Gravity, Urine 10/13/2024 1.025  1.005 - 1.035 Final    POC Blood, Urine 10/13/2024 LARGE (3+) (A)  NEGATIVE Final    POC PH, Urine 10/13/2024 6.0  No Reference Range Established PH Final    POC Protein, Urine 10/13/2024 30 (1+)  NEGATIVE, 30 (1+) mg/dl Final    POC Urobilinogen, Urine 10/13/2024 0.2  0.2, 1.0 EU/DL Final    Poc Nitrite, Urine 10/13/2024 POSITIVE (A)  NEGATIVE Final    POC Leukocytes, Urine 10/13/2024 LARGE (3+) (A)  NEGATIVE Final    Urine Culture 10/13/2024 >100,000 Escherichia coli (A)   Final   Office Visit on 10/09/2024   Component Date Value Ref Range Status    POC MICHA-COV-2 AG 10/09/2024 Presumptive negative  test for SARS-CoV-2 (no antigen detected)  Presumptive negative test for SARS-CoV-2 (no antigen detected) Final    POC Rapid Influenza A 10/09/2024 Negative  Negative Final    POC Rapid Influenza B 10/09/2024 Negative  Negative Final        Assessment/Plan     Problem List Items Addressed This Visit       3-vessel CAD    History of ventricular fibrillation    Hyperlipidemia    Grief reaction    Relevant Medications    sertraline (Zoloft) 100 mg tablet    Current moderate episode of major depressive disorder, unspecified whether recurrent (Multi)     Other Visit Diagnoses       Primary insomnia        Relevant Medications    traZODone (Desyrel) 50 mg tablet          She relates had illness sxs and thinks it was uti-not dxd until 2nd visit    Follow up at next scheduled visit -as planned  Labs due in spring  Mood is stable  No hi/si  Is not overly anxious  Sleeping well  Reqs wegovy  Will discuss w savanna  Also had hematuria  Will check ua  Admits never followed w dr gaspar-dtr  -stress etc  Return to uro  Bw today  The patient is aware that results will be forthcoming of ALL planned labs and or tests. If no results are received on my chart or by letter within 1 - 3 weeks, the patient is aware they need to call to obtain results, as this is not usual. Also, if any new conditions arise, or current condition worsens, it is understood that sooner appointment should be made or urgent care/convenient care or emergency room treatment should be sought depending on severity. Otherwise follow up for recheck at regular intervals as we have discussed, at least yearly.

## 2024-11-11 NOTE — PROGRESS NOTES
Covid vax: x 4  Flu: advised  Pneumo: UTD  RSV: UTD  Shingles: UTD    CRC: due 2026  Mammogram: 7/2024  Pap: 5/2023  Lmp: n/a

## 2024-11-14 LAB — BACTERIA UR CULT: ABNORMAL

## 2024-11-19 ENCOUNTER — APPOINTMENT (OUTPATIENT)
Dept: PHARMACY | Facility: HOSPITAL | Age: 69
End: 2024-11-19
Payer: MEDICARE

## 2024-11-19 RX ORDER — SEMAGLUTIDE 0.25 MG/.5ML
0.25 INJECTION, SOLUTION SUBCUTANEOUS WEEKLY
Qty: 2 ML | Refills: 0 | Status: SHIPPED | OUTPATIENT
Start: 2024-11-19 | End: 2024-12-11

## 2024-11-19 NOTE — ASSESSMENT & PLAN NOTE
Current regimen includes N/a. Patient's current weight reported as 184 pounds on 11.11.2024. Given BMI above goal and previous MI, will plan to start Wegovy today. Patient also eligible for  PAP, therefore will bring financial documents to PCP's office next week.    Medication Changes:  START  Wegovy 0.25 mg subcutaneously once weekly. Prescription sent to  [facility] pharmacy for  PAP.    Future Considerations:  Titrate GLP1a as needed    Monitoring and Education:   Patient Assistance Screening (VAF)  Patient verbally reports monthly or yearly income which is less than 400% federal poverty level  Application for program has been submitted for the following medications:   Wegovy  Patient aware this process may take up to 2 weeks once income documents have been sent to the team.  If approved, medication must be filled through [facility] pharmacy and may be picked up or mailed to patient.   If approved, medication will be billed through insurance, and patient assistance team will pay the copay. This will result in a $0 copay for the patient.  Counseled patient on mechanism of action, side effects, contraindications, and what to do if the patient misses a dose. All patients questions were answered.    Counseled patient on relevant MOA, expectations, side effects, duration of therapy, contraindications, administration, and monitoring parameters.  All questions and concerns addressed. Contact pharmacist with any further questions or concerns prior to next appointment.

## 2024-11-19 NOTE — PROGRESS NOTES
Clinical Pharmacy Appointment    Patient ID: Mikaela Cárdenas is a 69 y.o. female who presents for Weight Loss.    Pt is here for First appointment.     Referring Provider: Daija Spencer, *  PCP: Daija Spencer MD   Last visit with PCP: 11.11.2024   Next visit with PCP: Not scheduled    Subjective   HPI  WEIGHT LOSS  BMI Readings from Last 3 Encounters:   11/11/24 32.59 kg/m²   10/13/24 31.53 kg/m²   10/09/24 32.24 kg/m²      Starting weight: 184 lbs. (11.11.2024)  Current weight: Not assessed today    Lifestyle  Diet: Using Weight Watchers  Has lost about 20 pounds    Non-Pharmacological Therapy  Weight loss techniques attempted:  Self-directed dieting: Weight Watchers    Pharmacological Therapy  Current Medications: N/a  Adverse Effects: N/a    Insurance coverage of weight-loss medications? Yes, Medicare covers for reduction of cardiovascular disease   Eligible for [employer] cards/programs? No, government funded insurance  Eligible for [facility]? Yes, reports income <400% FPL    Drug Interactions  No relevant drug interactions were noted.    Objective   No Known Allergies  Social History     Social History Narrative    Not on file      Medication Review  Current Outpatient Medications   Medication Instructions    alendronate (FOSAMAX) 70 mg, oral, Every 7 days, Take in the morning with a full glass of water, on an empty stomach, and do not take anything else by mouth or lie down for the next 30 min.    ALPRAZolam (XANAX) 0.5 mg, oral, 2 times daily PRN    aspirin 81 mg EC tablet 1 tablet, Daily    atorvastatin (LIPITOR) 80 mg, oral, Daily    lisinopril 5 mg, oral, Daily    metoprolol succinate XL (TOPROL-XL) 25 mg, oral, Daily    nitroglycerin (NITROSTAT) 0.4 mg, sublingual, Every 5 min PRN, FOR CHEST PAIN    sertraline (ZOLOFT) 100 mg, oral, Daily    traZODone (DESYREL) 50 mg, oral, Nightly PRN      Vitals  BP Readings from Last 2 Encounters:   11/11/24 124/74   10/13/24 148/74     BMI Readings from Last 1  "Encounters:   11/11/24 32.59 kg/m²      Labs  A1C  Lab Results   Component Value Date    HGBA1C 5.4 11/11/2024    HGBA1C 5.2 05/07/2024    HGBA1C 5.4 05/09/2023     BMP  Lab Results   Component Value Date    CALCIUM 8.8 11/11/2024     11/11/2024    K 4.0 11/11/2024    CO2 28 11/11/2024     (H) 11/11/2024    BUN 29 (H) 11/11/2024    CREATININE 0.94 11/11/2024    EGFR 66 11/11/2024     LFTs  Lab Results   Component Value Date    ALT 21 11/11/2024    AST 15 11/11/2024    ALKPHOS 91 11/11/2024    BILITOT 0.4 11/11/2024     FLP  Lab Results   Component Value Date    TRIG 98 05/07/2024    CHOL 125 05/07/2024    LDLF 66 05/09/2023    LDLCALC 65 05/07/2024    HDL 40.9 05/07/2024     Urine Microalbumin  No results found for: \"MICROALBCREA\"  Weight Management  Wt Readings from Last 3 Encounters:   11/11/24 83.5 kg (184 lb)   10/13/24 80.7 kg (178 lb)   10/09/24 82.6 kg (182 lb)      There is no height or weight on file to calculate BMI.     Assessment/Plan   Problem List Items Addressed This Visit       BMI 33.0-33.9,adult     Current regimen includes N/a. Patient's current weight reported as 184 pounds on 11.11.2024. Given BMI above goal and previous MI, will plan to start Wegovy today. Patient also eligible for  PAP, therefore will bring financial documents to PCP's office next week.    Medication Changes:  START  Wegovy 0.25 mg subcutaneously once weekly. Prescription sent to  [facility] pharmacy for  PAP.    Future Considerations:  Titrate GLP1a as needed    Monitoring and Education:   Patient Assistance Screening (VAF)  Patient verbally reports monthly or yearly income which is less than 400% federal poverty level  Application for program has been submitted for the following medications:   Wegovy  Patient aware this process may take up to 2 weeks once income documents have been sent to the team.  If approved, medication must be filled through [facility] pharmacy and may be picked up or mailed to patient. "   If approved, medication will be billed through insurance, and patient assistance team will pay the copay. This will result in a $0 copay for the patient.  Counseled patient on mechanism of action, side effects, contraindications, and what to do if the patient misses a dose. All patients questions were answered.    Counseled patient on relevant MOA, expectations, side effects, duration of therapy, contraindications, administration, and monitoring parameters.  All questions and concerns addressed. Contact pharmacist with any further questions or concerns prior to next appointment.           Clinical Pharmacist follow-up: 12.2.2024 - 11.30 am, In-Person visit    Continue all meds under the continuation of care with the referring provider and clinical pharmacy team.    Thank you,  Prachi Gutierrez, PharmD  Clinical Pharmacist  [phone]    Verbal consent to manage patient's drug therapy was obtained from the patient. They were informed they may decline to participate or withdraw from participation in pharmacy services at any time.

## 2024-11-25 ENCOUNTER — LAB (OUTPATIENT)
Dept: LAB | Facility: LAB | Age: 69
End: 2024-11-25
Payer: MEDICARE

## 2024-11-25 DIAGNOSIS — N39.0 FREQUENT UTI: ICD-10-CM

## 2024-11-25 LAB
APPEARANCE UR: CLEAR
BACTERIA #/AREA URNS AUTO: ABNORMAL /HPF
BILIRUB UR STRIP.AUTO-MCNC: NEGATIVE MG/DL
COLOR UR: YELLOW
GLUCOSE UR STRIP.AUTO-MCNC: NORMAL MG/DL
KETONES UR STRIP.AUTO-MCNC: NEGATIVE MG/DL
LEUKOCYTE ESTERASE UR QL STRIP.AUTO: ABNORMAL
MUCOUS THREADS #/AREA URNS AUTO: ABNORMAL /LPF
NITRITE UR QL STRIP.AUTO: NEGATIVE
PH UR STRIP.AUTO: 5 [PH]
PROT UR STRIP.AUTO-MCNC: NEGATIVE MG/DL
RBC # UR STRIP.AUTO: ABNORMAL /UL
RBC #/AREA URNS AUTO: >20 /HPF
SP GR UR STRIP.AUTO: 1.02
SQUAMOUS #/AREA URNS AUTO: ABNORMAL /HPF
UROBILINOGEN UR STRIP.AUTO-MCNC: NORMAL MG/DL
WBC #/AREA URNS AUTO: ABNORMAL /HPF

## 2024-11-25 PROCEDURE — 81001 URINALYSIS AUTO W/SCOPE: CPT

## 2024-11-25 PROCEDURE — 87086 URINE CULTURE/COLONY COUNT: CPT

## 2024-11-26 LAB — BACTERIA UR CULT: NO GROWTH

## 2024-12-02 ENCOUNTER — APPOINTMENT (OUTPATIENT)
Dept: PRIMARY CARE | Facility: CLINIC | Age: 69
End: 2024-12-02
Payer: MEDICARE

## 2024-12-02 PROCEDURE — RXMED WILLOW AMBULATORY MEDICATION CHARGE

## 2024-12-05 ENCOUNTER — PHARMACY VISIT (OUTPATIENT)
Dept: PHARMACY | Facility: CLINIC | Age: 69
End: 2024-12-05
Payer: COMMERCIAL

## 2024-12-09 ENCOUNTER — APPOINTMENT (OUTPATIENT)
Dept: PRIMARY CARE | Facility: CLINIC | Age: 69
End: 2024-12-09
Payer: MEDICARE

## 2024-12-09 NOTE — PROGRESS NOTES
Clinical Pharmacy Appointment    Patient ID: Mikaela Cárdenas is a 69 y.o. female who presents for Weight Loss.    Patient presents to [facility] for Follow Up appointment and for Wegovy administration education.    Referring Provider: Daija Spencer, *  PCP: Daija Spencer MD   Last visit with PCP: 11.11.2024   Next visit with PCP: Not scheduled     Patient Assistance for Wegovy approved through 12/2/2025. Will have to be renewed prior to that date to prevent lapse in coverage. Medication(s) will be received at no cost to patient from [facility].     Subjective   Interval History:  Approved for  PAP for Wegovy  Has not yet started taking Wegovy  Going to [place] 12/22-12/30    HPI  WEIGHT LOSS  BMI Readings from Last 3 Encounters:   11/11/24 32.59 kg/m²   10/13/24 31.53 kg/m²   10/09/24 32.24 kg/m²      Starting weight: 184 lbs. (11.11.2024)  Current weight: Not assessed today    Lifestyle  Diet: Using Weight Watchers  Has lost about 20 pounds    Non-Pharmacological Therapy  Weight loss techniques attempted:  Self-directed dieting: Weight Watchers    Pharmacological Therapy  Current Medications: N/a  Adverse Effects: N/a    Insurance coverage of weight-loss medications? Yes, Medicare covers for reduction of cardiovascular disease   Eligible for copay cards/programs? No, government funded insurance  Eligible for  PAP? Yes, reports income <400% FPL    Drug Interactions  No relevant drug interactions were noted.    Objective   No Known Allergies  Social History     Social History Narrative    Not on file      Medication Review  Current Outpatient Medications   Medication Instructions    alendronate (FOSAMAX) 70 mg, oral, Every 7 days, Take in the morning with a full glass of water, on an empty stomach, and do not take anything else by mouth or lie down for the next 30 min.    ALPRAZolam (XANAX) 0.5 mg, oral, 2 times daily PRN    aspirin 81 mg EC tablet 1 tablet, Daily     "atorvastatin (LIPITOR) 80 mg, oral, Daily    lisinopril 5 mg, oral, Daily    metoprolol succinate XL (TOPROL-XL) 25 mg, oral, Daily    nitroglycerin (NITROSTAT) 0.4 mg, sublingual, Every 5 min PRN, FOR CHEST PAIN    sertraline (ZOLOFT) 100 mg, oral, Daily    traZODone (DESYREL) 50 mg, oral, Nightly PRN    Wegovy 0.25 mg, subcutaneous, Weekly      Vitals  BP Readings from Last 2 Encounters:   11/11/24 124/74   10/13/24 148/74     BMI Readings from Last 1 Encounters:   11/11/24 32.59 kg/m²      Labs  A1C  Lab Results   Component Value Date    HGBA1C 5.4 11/11/2024    HGBA1C 5.2 05/07/2024    HGBA1C 5.4 05/09/2023     BMP  Lab Results   Component Value Date    CALCIUM 8.8 11/11/2024     11/11/2024    K 4.0 11/11/2024    CO2 28 11/11/2024     (H) 11/11/2024    BUN 29 (H) 11/11/2024    CREATININE 0.94 11/11/2024    EGFR 66 11/11/2024     LFTs  Lab Results   Component Value Date    ALT 21 11/11/2024    AST 15 11/11/2024    ALKPHOS 91 11/11/2024    BILITOT 0.4 11/11/2024     FLP  Lab Results   Component Value Date    TRIG 98 05/07/2024    CHOL 125 05/07/2024    LDLF 66 05/09/2023    LDLCALC 65 05/07/2024    HDL 40.9 05/07/2024     Urine Microalbumin  No results found for: \"MICROALBCREA\"  Weight Management  Wt Readings from Last 3 Encounters:   11/11/24 83.5 kg (184 lb)   10/13/24 80.7 kg (178 lb)   10/09/24 82.6 kg (182 lb)      There is no height or weight on file to calculate BMI.     Assessment/Plan   Problem List Items Addressed This Visit       BMI 33.0-33.9,adult     Current regimen includes N/a. Patient's current weight reported as 184 pounds on 11.11.2024. Given BMI above goal and previous MI, will start Wegovy today. Patient completed first injection with [facility].     Medication Changes:  START  Wegovy 0.25 mg subcutaneously once weekly.     Future Considerations:  Titrate GLP1a as needed    Monitoring and Education:  Provided detailed dosing and administration counseling to ensure proper technique. "   Reviewed Wegovy titration schedule, starting with 2.5 mg once weekly to a goal of 2.4 mg once weekly if tolerated  Counseled patient on the benefits of GLP-1ra glycemic control and weight loss  Reviewed storage requirements of Wegovy when not in use, and when to administer the medication if a dose is missed.  Advised patient that they may experience improved satiety after meals and portion sizes of meals may be reduced as doses of Wegovy increase.  Counseled patient on relevant MOA, expectations, side effects, duration of therapy, contraindications, administration, and monitoring parameters.  All questions and concerns addressed. Contact pharmacist with any further questions or concerns prior to next appointment.           Clinical Pharmacist follow-up: 12.24.2024 - 11 am, Telehealth visit    Continue all meds under the continuation of care with the referring provider and clinical pharmacy team.    Thank you,  Prachi Gutierrez, PharmD  Clinical Pharmacist  [phone]    Verbal consent to manage patient's drug therapy was obtained from the patient. They were informed they may decline to participate or withdraw from participation in pharmacy services at any time.

## 2024-12-09 NOTE — ASSESSMENT & PLAN NOTE
Current regimen includes N/a. Patient's current weight reported as 184 pounds on 11.11.2024. Given BMI above goal and previous MI, will start Wegovy today. Patient completed first injection with [facility].     Medication Changes:  START  Wegovy 0.25 mg subcutaneously once weekly.     Future Considerations:  Titrate GLP1a as needed    Monitoring and Education:  Provided detailed dosing and administration counseling to ensure proper technique.   Reviewed Wegovy titration schedule, starting with 2.5 mg once weekly to a goal of 2.4 mg once weekly if tolerated  Counseled patient on the benefits of GLP-1ra glycemic control and weight loss  Reviewed storage requirements of Wegovy when not in use, and when to administer the medication if a dose is missed.  Advised patient that they may experience improved satiety after meals and portion sizes of meals may be reduced as doses of Wegovy increase.  Counseled patient on relevant MOA, expectations, side effects, duration of therapy, contraindications, administration, and monitoring parameters.  All questions and concerns addressed. Contact pharmacist with any further questions or concerns prior to next appointment.

## 2024-12-24 ENCOUNTER — APPOINTMENT (OUTPATIENT)
Dept: PHARMACY | Facility: HOSPITAL | Age: 69
End: 2024-12-24
Payer: MEDICARE

## 2024-12-24 PROCEDURE — RXMED WILLOW AMBULATORY MEDICATION CHARGE

## 2024-12-24 RX ORDER — SEMAGLUTIDE 0.25 MG/.5ML
0.25 INJECTION, SOLUTION SUBCUTANEOUS WEEKLY
Qty: 2 ML | Refills: 0 | Status: SHIPPED | OUTPATIENT
Start: 2024-12-24 | End: 2025-01-25

## 2024-12-24 NOTE — ASSESSMENT & PLAN NOTE
Current regimen includes Wegovy 0.25 mg subcutaneously once weekly. Patient's current weight reported as 178 pounds on 12.24.24. Has lost about 6 pounds since starting therapy (~3.26% change in total body weight). Given side effects, will continue Wegovy dose today and plan to increase dose at follow-up.     Medication Changes:  CONTINUE  Wegovy 0.25 mg subcutaneously once weekly. Prescription sent to [facility] pharmacy for delivery.    Future Considerations:  Titrate GLP1a as needed    Monitoring and Education:  Provided detailed dosing and administration counseling to ensure proper technique.   Reviewed Wegovy titration schedule, starting with 2.5 mg once weekly to a goal of 2.4 mg once weekly if tolerated  Counseled patient on the benefits of GLP-1ra glycemic control and weight loss  Reviewed storage requirements of Wegovy when not in use, and when to administer the medication if a dose is missed.  Advised patient that they may experience improved satiety after meals and portion sizes of meals may be reduced as doses of Wegovy increase.  Counseled patient on relevant MOA, expectations, side effects, duration of therapy, contraindications, administration, and monitoring parameters.  All questions and concerns addressed. Contact pharmacist with any further questions or concerns prior to next appointment.

## 2024-12-24 NOTE — PROGRESS NOTES
Clinical Pharmacy Appointment    Patient ID: Mikaela Cárdenas is a 69 y.o. female who presents for Weight Loss.    Referring Provider: Daija Spencer, *  PCP: Daija Spencer MD   Last visit with PCP: 11.11.2024   Next visit with PCP: Not scheduled     Patient Assistance for Wegovy approved through 12/2/2025. Will have to be renewed prior to that date to prevent lapse in coverage. Medication(s) will be received at no cost to patient from [facility].     Subjective   Interval History:  Started taking Wegovy  Lost about 6 pounds  Some diarrhea x1 episode, took Imodium  Likely diet related  At [place] 12/22-12/30    HPI  WEIGHT LOSS  BMI Readings from Last 3 Encounters:   11/11/24 32.59 kg/m²   10/13/24 31.53 kg/m²   10/09/24 32.24 kg/m²      Starting weight: 184 lbs. (11.11.2024)  Current weight: 178 lbs. (12.24.2024)    Lifestyle  Diet: Using Weight Watchers  Has lost about 20 pounds    Non-Pharmacological Therapy  Weight loss techniques attempted:  Self-directed dieting: Weight Watchers    Pharmacological Therapy  Current Medications: Wegovy 0.25 mg subcutaneously once weekly  Adverse Effects: diarrhea x1    Insurance coverage of weight-loss medications? Yes, Medicare covers for reduction of cardiovascular disease   Eligible for copay cards/programs? No, government funded insurance  Eligible for  PAP? Yes, reports income <400% FPL    Drug Interactions  No relevant drug interactions were noted.    Objective   No Known Allergies  Social History     Social History Narrative    Not on file      Medication Review  Current Outpatient Medications   Medication Instructions    alendronate (FOSAMAX) 70 mg, oral, Every 7 days, Take in the morning with a full glass of water, on an empty stomach, and do not take anything else by mouth or lie down for the next 30 min.    ALPRAZolam (XANAX) 0.5 mg, oral, 2 times daily PRN    aspirin 81 mg EC tablet 1 tablet, Daily    atorvastatin (LIPITOR) 80 mg,  "oral, Daily    lisinopril 5 mg, oral, Daily    metoprolol succinate XL (TOPROL-XL) 25 mg, oral, Daily    nitroglycerin (NITROSTAT) 0.4 mg, sublingual, Every 5 min PRN, FOR CHEST PAIN    sertraline (ZOLOFT) 100 mg, oral, Daily    traZODone (DESYREL) 50 mg, oral, Nightly PRN    Wegovy 0.25 mg, subcutaneous, Weekly      Vitals  BP Readings from Last 2 Encounters:   11/11/24 124/74   10/13/24 148/74     BMI Readings from Last 1 Encounters:   11/11/24 32.59 kg/m²      Labs  A1C  Lab Results   Component Value Date    HGBA1C 5.4 11/11/2024    HGBA1C 5.2 05/07/2024    HGBA1C 5.4 05/09/2023     BMP  Lab Results   Component Value Date    CALCIUM 8.8 11/11/2024     11/11/2024    K 4.0 11/11/2024    CO2 28 11/11/2024     (H) 11/11/2024    BUN 29 (H) 11/11/2024    CREATININE 0.94 11/11/2024    EGFR 66 11/11/2024     LFTs  Lab Results   Component Value Date    ALT 21 11/11/2024    AST 15 11/11/2024    ALKPHOS 91 11/11/2024    BILITOT 0.4 11/11/2024     FLP  Lab Results   Component Value Date    TRIG 98 05/07/2024    CHOL 125 05/07/2024    LDLF 66 05/09/2023    LDLCALC 65 05/07/2024    HDL 40.9 05/07/2024     Urine Microalbumin  No results found for: \"MICROALBCREA\"  Weight Management  Wt Readings from Last 3 Encounters:   11/11/24 83.5 kg (184 lb)   10/13/24 80.7 kg (178 lb)   10/09/24 82.6 kg (182 lb)      There is no height or weight on file to calculate BMI.     Assessment/Plan   Problem List Items Addressed This Visit       BMI 33.0-33.9,adult     Current regimen includes Wegovy 0.25 mg subcutaneously once weekly. Patient's current weight reported as 178 pounds on 12.24.24. Has lost about 6 pounds since starting therapy (~3.26% change in total body weight). Given side effects, will continue Wegovy dose today and plan to increase dose at follow-up.     Medication Changes:  CONTINUE  Wegovy 0.25 mg subcutaneously once weekly. Prescription sent to [facility] pharmacy for delivery.    Future Considerations:  Titrate " GLP1a as needed    Monitoring and Education:  Provided detailed dosing and administration counseling to ensure proper technique.   Reviewed Wegovy titration schedule, starting with 2.5 mg once weekly to a goal of 2.4 mg once weekly if tolerated  Counseled patient on the benefits of GLP-1ra glycemic control and weight loss  Reviewed storage requirements of Wegovy when not in use, and when to administer the medication if a dose is missed.  Advised patient that they may experience improved satiety after meals and portion sizes of meals may be reduced as doses of Wegovy increase.  Counseled patient on relevant MOA, expectations, side effects, duration of therapy, contraindications, administration, and monitoring parameters.  All questions and concerns addressed. Contact pharmacist with any further questions or concerns prior to next appointment.           Clinical Pharmacist follow-up: 1.14.2025 - 11 am, Telehealth visit    Continue all meds under the continuation of care with the referring provider and clinical pharmacy team.    Thank you,  Prachi Gutierrez, PharmD  Clinical Pharmacist  [phone]    Verbal consent to manage patient's drug therapy was obtained from the patient. They were informed they may decline to participate or withdraw from participation in pharmacy services at any time.

## 2024-12-30 ENCOUNTER — PHARMACY VISIT (OUTPATIENT)
Dept: PHARMACY | Facility: CLINIC | Age: 69
End: 2024-12-30
Payer: COMMERCIAL

## 2025-01-14 ENCOUNTER — APPOINTMENT (OUTPATIENT)
Dept: PHARMACY | Facility: HOSPITAL | Age: 70
End: 2025-01-14
Payer: MEDICARE

## 2025-01-14 PROCEDURE — RXMED WILLOW AMBULATORY MEDICATION CHARGE

## 2025-01-14 RX ORDER — SEMAGLUTIDE 0.5 MG/.5ML
0.5 INJECTION, SOLUTION SUBCUTANEOUS WEEKLY
Qty: 2 ML | Refills: 0 | Status: SHIPPED | OUTPATIENT
Start: 2025-01-14 | End: 2025-02-13

## 2025-01-14 NOTE — PROGRESS NOTES
Clinical Pharmacy Appointment    Patient ID: Mkiaela Cárdenas is a 69 y.o. female who presents for Weight Loss.    Referring Provider: Daija Spencer, *  PCP: Daija Spencer MD   Last visit with PCP: 11.11.2024   Next visit with PCP: Not scheduled     Patient Assistance for Wegovy approved through 12/2/2025. Will have to be renewed prior to that date to prevent lapse in coverage. Medication(s) will be received at no cost to patient from [facility].     Subjective   Interval History:  Continues with Wegovy  Lost about 4 additional pounds since last appt  About 10 pounds total since last appt  GI symptoms resolved    HPI  WEIGHT LOSS  BMI Readings from Last 3 Encounters:   11/11/24 32.59 kg/m²   10/13/24 31.53 kg/m²   10/09/24 32.24 kg/m²      Starting weight: 184 lbs. (11.11.2024)  December weight: 178 lbs. (12.24.2024)  Current weight: 174 lbs (1.14.2025)    Lifestyle  Diet: Using Weight Watchers currently  Reports decreased portions/unable finish food on plate    Non-Pharmacological Therapy  Weight loss techniques attempted:  Self-directed dieting: Weight Watchers    Pharmacological Therapy  Current Medications: Wegovy 0.25 mg subcutaneously once weekly  Adverse Effects: Denies    Insurance coverage of weight-loss medications? Yes, Medicare covers for reduction of cardiovascular disease   Eligible for copay cards/programs? No, government funded insurance  Eligible for  PAP? Yes, reports income <400% FPL    Drug Interactions  No relevant drug interactions were noted.    Objective   No Known Allergies  Social History     Social History Narrative    Not on file      Medication Review  Current Outpatient Medications   Medication Instructions    alendronate (FOSAMAX) 70 mg, oral, Every 7 days, Take in the morning with a full glass of water, on an empty stomach, and do not take anything else by mouth or lie down for the next 30 min.    ALPRAZolam (XANAX) 0.5 mg, oral, 2 times daily PRN     "aspirin 81 mg EC tablet 1 tablet, Daily    atorvastatin (LIPITOR) 80 mg, oral, Daily    lisinopril 5 mg, oral, Daily    metoprolol succinate XL (TOPROL-XL) 25 mg, oral, Daily    nitroglycerin (NITROSTAT) 0.4 mg, sublingual, Every 5 min PRN, FOR CHEST PAIN    sertraline (ZOLOFT) 100 mg, oral, Daily    traZODone (DESYREL) 50 mg, oral, Nightly PRN    Wegovy 0.25 mg, subcutaneous, Weekly      Vitals  BP Readings from Last 2 Encounters:   11/11/24 124/74   10/13/24 148/74     BMI Readings from Last 1 Encounters:   11/11/24 32.59 kg/m²      Labs  A1C  Lab Results   Component Value Date    HGBA1C 5.4 11/11/2024    HGBA1C 5.2 05/07/2024    HGBA1C 5.4 05/09/2023     BMP  Lab Results   Component Value Date    CALCIUM 8.8 11/11/2024     11/11/2024    K 4.0 11/11/2024    CO2 28 11/11/2024     (H) 11/11/2024    BUN 29 (H) 11/11/2024    CREATININE 0.94 11/11/2024    EGFR 66 11/11/2024     LFTs  Lab Results   Component Value Date    ALT 21 11/11/2024    AST 15 11/11/2024    ALKPHOS 91 11/11/2024    BILITOT 0.4 11/11/2024     FLP  Lab Results   Component Value Date    TRIG 98 05/07/2024    CHOL 125 05/07/2024    LDLF 66 05/09/2023    LDLCALC 65 05/07/2024    HDL 40.9 05/07/2024     Urine Microalbumin  No results found for: \"MICROALBCREA\"  Weight Management  Wt Readings from Last 3 Encounters:   11/11/24 83.5 kg (184 lb)   10/13/24 80.7 kg (178 lb)   10/09/24 82.6 kg (182 lb)      There is no height or weight on file to calculate BMI.     Assessment/Plan   Problem List Items Addressed This Visit       BMI 33.0-33.9,adult     Current regimen includes Wegovy 0.25 mg subcutaneously once weekly. Patient's current weight reported as 174 pounds on 1.14.25. Has lost about 10 pounds since starting therapy (~5.43% change in total body weight). Given BMI still above goal, will plan to begin titration of Wegovy with goal to be to reach maintenance dose.     Medication Changes:  INCREASE  Wegovy 0.5 mg subcutaneously once weekly. " Prescription sent to [facility] pharmacy for delivery.    Future Considerations:  Titrate GLP1a as needed    Monitoring and Education:  Provided detailed dosing and administration counseling to ensure proper technique.   Reviewed Wegovy titration schedule, starting with 2.5 mg once weekly to a goal of 2.4 mg once weekly if tolerated  Counseled patient on the benefits of GLP-1ra glycemic control and weight loss  Reviewed storage requirements of Wegovy when not in use, and when to administer the medication if a dose is missed.  Advised patient that they may experience improved satiety after meals and portion sizes of meals may be reduced as doses of Wegovy increase.  Counseled patient on relevant MOA, expectations, side effects, duration of therapy, contraindications, administration, and monitoring parameters.  All questions and concerns addressed. Contact pharmacist with any further questions or concerns prior to next appointment.           Clinical Pharmacist follow-up: 2.11.2025 - 11 am, Telehealth visit    Continue all meds under the continuation of care with the referring provider and clinical pharmacy team.    Thank you,  Prachi Gutierrez, PharmD  Clinical Pharmacist  [phone]    Verbal consent to manage patient's drug therapy was obtained from the patient. They were informed they may decline to participate or withdraw from participation in pharmacy services at any time.

## 2025-01-14 NOTE — ASSESSMENT & PLAN NOTE
Current regimen includes Wegovy 0.25 mg subcutaneously once weekly. Patient's current weight reported as 174 pounds on 1.14.25. Has lost about 10 pounds since starting therapy (~5.43% change in total body weight). Given BMI still above goal, will plan to begin titration of Wegovy with goal to be to reach maintenance dose.     Medication Changes:  INCREASE  Wegovy 0.5 mg subcutaneously once weekly. Prescription sent to [facility] pharmacy for delivery.    Future Considerations:  Titrate GLP1a as needed    Monitoring and Education:  Provided detailed dosing and administration counseling to ensure proper technique.   Reviewed Wegovy titration schedule, starting with 2.5 mg once weekly to a goal of 2.4 mg once weekly if tolerated  Counseled patient on the benefits of GLP-1ra glycemic control and weight loss  Reviewed storage requirements of Wegovy when not in use, and when to administer the medication if a dose is missed.  Advised patient that they may experience improved satiety after meals and portion sizes of meals may be reduced as doses of Wegovy increase.  Counseled patient on relevant MOA, expectations, side effects, duration of therapy, contraindications, administration, and monitoring parameters.  All questions and concerns addressed. Contact pharmacist with any further questions or concerns prior to next appointment.

## 2025-01-16 ENCOUNTER — PHARMACY VISIT (OUTPATIENT)
Dept: PHARMACY | Facility: CLINIC | Age: 70
End: 2025-01-16
Payer: COMMERCIAL

## 2025-01-20 ENCOUNTER — APPOINTMENT (OUTPATIENT)
Dept: UROLOGY | Facility: CLINIC | Age: 70
End: 2025-01-20
Payer: MEDICARE

## 2025-01-20 VITALS
DIASTOLIC BLOOD PRESSURE: 69 MMHG | HEIGHT: 63 IN | SYSTOLIC BLOOD PRESSURE: 109 MMHG | WEIGHT: 177 LBS | BODY MASS INDEX: 31.36 KG/M2 | HEART RATE: 79 BPM

## 2025-01-20 DIAGNOSIS — R39.15 URGENCY OF URINATION: ICD-10-CM

## 2025-01-20 DIAGNOSIS — N20.0 NEPHROLITHIASIS: ICD-10-CM

## 2025-01-20 DIAGNOSIS — N30.00 ACUTE CYSTITIS WITHOUT HEMATURIA: ICD-10-CM

## 2025-01-20 DIAGNOSIS — R31.9 HEMATURIA, UNSPECIFIED TYPE: Primary | ICD-10-CM

## 2025-01-20 DIAGNOSIS — R31.0 GROSS HEMATURIA: ICD-10-CM

## 2025-01-20 LAB
APPEARANCE UR: ABNORMAL
BACTERIA #/AREA URNS AUTO: ABNORMAL /HPF
BILIRUB UR STRIP.AUTO-MCNC: NEGATIVE MG/DL
COLOR UR: ABNORMAL
GLUCOSE UR STRIP.AUTO-MCNC: NORMAL MG/DL
KETONES UR STRIP.AUTO-MCNC: NEGATIVE MG/DL
LEUKOCYTE ESTERASE UR QL STRIP.AUTO: ABNORMAL
MUCOUS THREADS #/AREA URNS AUTO: ABNORMAL /LPF
NITRITE UR QL STRIP.AUTO: ABNORMAL
PH UR STRIP.AUTO: 5.5 [PH]
POC APPEARANCE, URINE: ABNORMAL
POC BILIRUBIN, URINE: NEGATIVE
POC BLOOD, URINE: ABNORMAL
POC COLOR, URINE: ABNORMAL
POC GLUCOSE, URINE: NEGATIVE MG/DL
POC KETONES, URINE: NEGATIVE MG/DL
POC LEUKOCYTES, URINE: ABNORMAL
POC NITRITE,URINE: POSITIVE
POC PH, URINE: 5.5 PH
POC PROTEIN, URINE: ABNORMAL MG/DL
POC SPECIFIC GRAVITY, URINE: 1.02
POC UROBILINOGEN, URINE: 0.2 EU/DL
PROT UR STRIP.AUTO-MCNC: ABNORMAL MG/DL
RBC # UR STRIP.AUTO: ABNORMAL /UL
RBC #/AREA URNS AUTO: >20 /HPF
SP GR UR STRIP.AUTO: 1.01
SQUAMOUS #/AREA URNS AUTO: ABNORMAL /HPF
UROBILINOGEN UR STRIP.AUTO-MCNC: NORMAL MG/DL
WBC #/AREA URNS AUTO: >50 /HPF
WBC CLUMPS #/AREA URNS AUTO: ABNORMAL /HPF

## 2025-01-20 PROCEDURE — 81001 URINALYSIS AUTO W/SCOPE: CPT

## 2025-01-20 PROCEDURE — 1036F TOBACCO NON-USER: CPT | Performed by: NURSE PRACTITIONER

## 2025-01-20 PROCEDURE — 1159F MED LIST DOCD IN RCRD: CPT | Performed by: NURSE PRACTITIONER

## 2025-01-20 PROCEDURE — G2211 COMPLEX E/M VISIT ADD ON: HCPCS | Performed by: NURSE PRACTITIONER

## 2025-01-20 PROCEDURE — 3008F BODY MASS INDEX DOCD: CPT | Performed by: NURSE PRACTITIONER

## 2025-01-20 PROCEDURE — 81003 URINALYSIS AUTO W/O SCOPE: CPT | Performed by: NURSE PRACTITIONER

## 2025-01-20 PROCEDURE — 88112 CYTOPATH CELL ENHANCE TECH: CPT | Performed by: PATHOLOGY

## 2025-01-20 PROCEDURE — 87186 SC STD MICRODIL/AGAR DIL: CPT

## 2025-01-20 PROCEDURE — 87086 URINE CULTURE/COLONY COUNT: CPT

## 2025-01-20 PROCEDURE — 99214 OFFICE O/P EST MOD 30 MIN: CPT | Performed by: NURSE PRACTITIONER

## 2025-01-20 PROCEDURE — 88112 CYTOPATH CELL ENHANCE TECH: CPT

## 2025-01-20 RX ORDER — KETOROLAC TROMETHAMINE 10 MG/1
10 TABLET, FILM COATED ORAL EVERY 6 HOURS PRN
Qty: 20 TABLET | Refills: 0 | Status: SHIPPED | OUTPATIENT
Start: 2025-01-20 | End: 2025-01-25

## 2025-01-20 NOTE — PROGRESS NOTES
Subjective   Patient ID: Mikaela Cárdenas is a 69 y.o. female who presents for Blood in Urine.  Patient presents for evaluation of gross hematuria. Patient states she had an episode of gross hematuria within the last few months, she believes in the context of stone passage. History of nephrolithiasis, previously seeing Dr. Perera last in 2022. She has been told she has uric acid stones, doing well with potassium citrate for dissolution however she did not follow up as her daughter passed away. Returns today to establish care.     Believes she has UTI at present, moderate dysuria. Last UTI in Nov 2024.     Never smoked         Review of Systems   All other systems reviewed and are negative.      Objective   Physical Exam  Vitals reviewed.     Constitutional: Patient generally appears stated age. Good nutrition. No deformities. Good attention to grooming.  Neck: No neck masses. Good symmetry. No crepitus. Normal thyroid size and consistency with no masses.  Respiratory: Normal respiratory effort. No intercostal retractions. No use of accessory muscles.  Cardiovascular: Examination of the peripheral vascular system reveals no swelling or varicosities with good pulses. Normal extremity temperature, no edema or tenderness.  Abdomen: Examination of the abdomen reveals no masses or tenderness. No hernias detected. Normal liver and spleen size.   Lymphatic: No palpable lymph nodes in the neck, axilla, groin or other locations  Skin: Inspection of the skin reveals no rashes, lesions, ulcers.  Neurologic/Psychiatric: Oriented to time, place and person. Normal mood and affect with no depression, anxiety, or agitation.    Office Visit on 01/20/2025   Component Date Value Ref Range Status    POC Color, Urine 01/20/2025 [place] (A)  Straw, Yellow, Light-Yellow Final    POC Appearance, Urine 01/20/2025 Cloudy (A)  Clear Final    POC Glucose, Urine 01/20/2025 NEGATIVE  NEGATIVE mg/dl Final    POC Bilirubin, Urine 01/20/2025 NEGATIVE   NEGATIVE Final    POC Ketones, Urine 01/20/2025 NEGATIVE  NEGATIVE mg/dl Final    POC Specific Gravity, Urine 01/20/2025 1.020  1.005 - 1.035 Final    POC Blood, Urine 01/20/2025 LARGE (3+) (A)  NEGATIVE Final    POC PH, Urine 01/20/2025 5.5  No Reference Range Established PH Final    POC Protein, Urine 01/20/2025 100 (2+) (A)  NEGATIVE mg/dl Final    POC Urobilinogen, Urine 01/20/2025 0.2  0.2, 1.0 EU/DL Final    Poc Nitrite, Urine 01/20/2025 POSITIVE (A)  NEGATIVE Final    POC Leukocytes, Urine 01/20/2025 LARGE (3+) (A)  NEGATIVE Final   Lab on 11/25/2024   Component Date Value Ref Range Status    Color, Urine 11/25/2024 Yellow  Light-Yellow, Yellow, Dark-Yellow Final    Appearance, Urine 11/25/2024 Clear  Clear Final    Specific Gravity, Urine 11/25/2024 1.021  1.005 - 1.035 Final    pH, Urine 11/25/2024 5.0  5.0, 5.5, 6.0, 6.5, 7.0, 7.5, 8.0 Final    Protein, Urine 11/25/2024 NEGATIVE  NEGATIVE, 10 (TRACE), 20 (TRACE) mg/dL Final    Glucose, Urine 11/25/2024 Normal  Normal mg/dL Final    Blood, Urine 11/25/2024 1.0 (3+) (A)  NEGATIVE Final    Ketones, Urine 11/25/2024 NEGATIVE  NEGATIVE mg/dL Final    Bilirubin, Urine 11/25/2024 NEGATIVE  NEGATIVE Final    Urobilinogen, Urine 11/25/2024 Normal  Normal mg/dL Final    Nitrite, Urine 11/25/2024 NEGATIVE  NEGATIVE Final    Leukocyte Esterase, Urine 11/25/2024 75 Arnol/µL (A)  NEGATIVE Final    Urine Culture 11/25/2024 No growth   Final    WBC, Urine 11/25/2024 6-10 (A)  1-5, NONE /HPF Final    RBC, Urine 11/25/2024 >20 (A)  NONE, 1-2, 3-5 /HPF Final    Squamous Epithelial Cells, Urine 11/25/2024 1-9 (SPARSE)  Reference range not established. /HPF Final    Bacteria, Urine 11/25/2024 1+ (A)  NONE SEEN /HPF Final    Mucus, Urine 11/25/2024 FEW  Reference range not established. /LPF Final         Assessment/Plan   Diagnoses and all orders for this visit:  Hematuria, unspecified type  -     Referral to Urology  -     POCT UA Automated manually resulted  -     Post-Void  Residual  Urgency of urination  -     Post-Void Residual  Acute cystitis without hematuria  -     Urinalysis with Reflex Culture and Microscopic; Future  -     CT urography w 3D volume rendered imaging; Future  -     Cystourethroscopy; Future  -     ketorolac (Toradol) 10 mg tablet; Take 1 tablet (10 mg) by mouth every 6 hours if needed for moderate pain (4 - 6) for up to 5 days.  Gross hematuria  -     CT urography w 3D volume rendered imaging; Future  -     Cystourethroscopy; Future  -     ketorolac (Toradol) 10 mg tablet; Take 1 tablet (10 mg) by mouth every 6 hours if needed for moderate pain (4 - 6) for up to 5 days.  -     Non-gynecologic cytology; Future  -     Basic metabolic panel; Future  Nephrolithiasis  -     CT urography w 3D volume rendered imaging; Future  -     Cystourethroscopy; Future  -     ketorolac (Toradol) 10 mg tablet; Take 1 tablet (10 mg) by mouth every 6 hours if needed for moderate pain (4 - 6) for up to 5 days.  -     Non-gynecologic cytology; Future  -     Basic metabolic panel; Future    We reviewed AUA guidelines for further evaluation of gross hematuria.  She may have recently passed a stone however I do think it is prudent to proceed with a evaluation per AUA guidelines.  This was reviewed in detail with the patient and she is in agreement with plan.  She will be scheduled accordingly.  Of note she is agreeable to restart potassium citrate for dissolution if CT confirms persistent nephrolithiasis.       Elizabeth Wasserman, KRISTAN-CNP 01/20/25 11:20 AM

## 2025-01-22 ENCOUNTER — TELEPHONE (OUTPATIENT)
Dept: UROLOGY | Facility: CLINIC | Age: 70
End: 2025-01-22
Payer: MEDICARE

## 2025-01-22 DIAGNOSIS — N39.0 FREQUENT UTI: Primary | ICD-10-CM

## 2025-01-22 RX ORDER — NITROFURANTOIN 25; 75 MG/1; MG/1
100 CAPSULE ORAL 2 TIMES DAILY
Qty: 14 CAPSULE | Refills: 0 | Status: SHIPPED | OUTPATIENT
Start: 2025-01-22 | End: 2025-01-29

## 2025-01-23 LAB — BACTERIA UR CULT: ABNORMAL

## 2025-01-24 LAB
LABORATORY COMMENT REPORT: NORMAL
LABORATORY COMMENT REPORT: NORMAL
PATH REPORT.FINAL DX SPEC: NORMAL
PATH REPORT.GROSS SPEC: NORMAL
PATH REPORT.RELEVANT HX SPEC: NORMAL
PATH REPORT.TOTAL CANCER: NORMAL

## 2025-01-28 LAB
BUN SERPL-MCNC: 24 MG/DL (ref 7–25)
BUN/CREAT SERPL: NORMAL (CALC) (ref 6–22)
CALCIUM SERPL-MCNC: 9.4 MG/DL (ref 8.6–10.4)
CHLORIDE SERPL-SCNC: 104 MMOL/L (ref 98–110)
CO2 SERPL-SCNC: 27 MMOL/L (ref 20–32)
CREAT SERPL-MCNC: 0.79 MG/DL (ref 0.5–1.05)
EGFRCR SERPLBLD CKD-EPI 2021: 81 ML/MIN/1.73M2
GLUCOSE SERPL-MCNC: 91 MG/DL (ref 65–139)
POTASSIUM SERPL-SCNC: 4.5 MMOL/L (ref 3.5–5.3)
SODIUM SERPL-SCNC: 141 MMOL/L (ref 135–146)

## 2025-02-03 ENCOUNTER — HOSPITAL ENCOUNTER (OUTPATIENT)
Dept: RADIOLOGY | Facility: HOSPITAL | Age: 70
Discharge: HOME | End: 2025-02-03
Payer: MEDICARE

## 2025-02-03 DIAGNOSIS — N20.0 NEPHROLITHIASIS: ICD-10-CM

## 2025-02-03 DIAGNOSIS — R31.0 GROSS HEMATURIA: ICD-10-CM

## 2025-02-03 DIAGNOSIS — N30.00 ACUTE CYSTITIS WITHOUT HEMATURIA: ICD-10-CM

## 2025-02-03 PROCEDURE — 76377 3D RENDER W/INTRP POSTPROCES: CPT

## 2025-02-03 PROCEDURE — 76376 3D RENDER W/INTRP POSTPROCES: CPT | Performed by: RADIOLOGY

## 2025-02-03 PROCEDURE — 2550000001 HC RX 255 CONTRASTS: Performed by: NURSE PRACTITIONER

## 2025-02-03 PROCEDURE — 74178 CT ABD&PLV WO CNTR FLWD CNTR: CPT | Performed by: RADIOLOGY

## 2025-02-03 RX ADMIN — IOHEXOL 75 ML: 350 INJECTION, SOLUTION INTRAVENOUS at 10:19

## 2025-02-07 DIAGNOSIS — N20.0 NEPHROLITHIASIS: Primary | ICD-10-CM

## 2025-02-11 ENCOUNTER — APPOINTMENT (OUTPATIENT)
Dept: PHARMACY | Facility: HOSPITAL | Age: 70
End: 2025-02-11
Payer: MEDICARE

## 2025-02-11 PROCEDURE — RXMED WILLOW AMBULATORY MEDICATION CHARGE

## 2025-02-11 RX ORDER — SEMAGLUTIDE 1 MG/.5ML
1 INJECTION, SOLUTION SUBCUTANEOUS WEEKLY
Qty: 2 ML | Refills: 0 | Status: SHIPPED | OUTPATIENT
Start: 2025-02-11 | End: 2025-03-13

## 2025-02-11 NOTE — PROGRESS NOTES
Clinical Pharmacy Appointment    Patient ID: Mikaela Cárdenas is a 69 y.o. female who presents for Weight Loss.    Referring Provider: Daija Spencer, *  PCP: Daija Spencer MD   Last visit with PCP: 11.11.2024   Next visit with PCP: Not scheduled     Patient Assistance for Wegovy approved through 12/2/2025. Will have to be renewed prior to that date to prevent lapse in coverage. Medication(s) will be received at no cost to patient from [facility].    Subjective   Interval History:  UTI/kidney stones  Scope on 2/24; Xray coming up  UTI resolved now/finished antibiotic  Continues with Wegovy 0.5 mg  About 10 pounds of weight loss total since starting therapy  GI symptoms resolved  Reports appetite suppression decreasing a few days after injection    HPI  WEIGHT LOSS  BMI Readings from Last 3 Encounters:   01/20/25 31.35 kg/m²   11/11/24 32.59 kg/m²   10/13/24 31.53 kg/m²      Starting weight: 184 lbs. (11.11.2024)  December weight: 178 lbs. (12.24.2024)  Current weight: 174 lbs (1.14.2025)    Lifestyle  Diet: Using Weight Watchers currently    Non-Pharmacological Therapy  Weight loss techniques attempted:  Self-directed dieting: Weight Watchers    Pharmacological Therapy  Current Medications: Wegovy 0.5 mg subcutaneously once weekly  Adverse Effects: Denies    Insurance coverage of weight-loss medications? Yes, Medicare covers for reduction of cardiovascular disease   Eligible for copay cards/programs? No, government funded insurance  Eligible for  PAP? Yes, reports income <400% FPL    Drug Interactions  No relevant drug interactions were noted.    Objective   Allergies   Allergen Reactions    Bactrim [Sulfamethoxazole-Trimethoprim] Nausea/vomiting     Pt states she does not have an allergy.  It made her nauseous.     Social History     Social History Narrative    Not on file      Medication Review  Current Outpatient Medications   Medication Instructions    alendronate (FOSAMAX) 70 mg,  "oral, Every 7 days, Take in the morning with a full glass of water, on an empty stomach, and do not take anything else by mouth or lie down for the next 30 min.    ALPRAZolam (XANAX) 0.5 mg, oral, 2 times daily PRN    aspirin 81 mg EC tablet 1 tablet, Daily    atorvastatin (LIPITOR) 80 mg, oral, Daily    lisinopril 5 mg, oral, Daily    metoprolol succinate XL (TOPROL-XL) 25 mg, oral, Daily    nitroglycerin (NITROSTAT) 0.4 mg, sublingual, Every 5 min PRN, FOR CHEST PAIN    sertraline (ZOLOFT) 100 mg, oral, Daily    traZODone (DESYREL) 50 mg, oral, Nightly PRN    Wegovy 0.5 mg, subcutaneous, Weekly      Vitals  BP Readings from Last 2 Encounters:   01/20/25 109/69   11/11/24 124/74     BMI Readings from Last 1 Encounters:   01/20/25 31.35 kg/m²      Labs  A1C  Lab Results   Component Value Date    HGBA1C 5.4 11/11/2024    HGBA1C 5.2 05/07/2024    HGBA1C 5.4 05/09/2023     BMP  Lab Results   Component Value Date    CALCIUM 9.4 01/27/2025     01/27/2025    K 4.5 01/27/2025    CO2 27 01/27/2025     01/27/2025    BUN 24 01/27/2025    CREATININE 0.79 01/27/2025    EGFR 81 01/27/2025     LFTs  Lab Results   Component Value Date    ALT 21 11/11/2024    AST 15 11/11/2024    ALKPHOS 91 11/11/2024    BILITOT 0.4 11/11/2024     FLP  Lab Results   Component Value Date    TRIG 98 05/07/2024    CHOL 125 05/07/2024    LDLF 66 05/09/2023    LDLCALC 65 05/07/2024    HDL 40.9 05/07/2024     Urine Microalbumin  No results found for: \"MICROALBCREA\"  Weight Management  Wt Readings from Last 3 Encounters:   01/20/25 80.3 kg (177 lb)   11/11/24 83.5 kg (184 lb)   10/13/24 80.7 kg (178 lb)      There is no height or weight on file to calculate BMI.     Assessment/Plan   Problem List Items Addressed This Visit       BMI 33.0-33.9,adult     Current regimen includes Wegovy 0.5 mg subcutaneously once weekly. Patient's current weight reported as 174 pounds on 1.14.25. Has lost about 10 pounds since starting therapy (~5.43% change in " total body weight). Given BMI still above goal, will plan to increase Wegovy dose again today.    Medication Changes:  INCREASE  Wegovy 1 mg subcutaneously once weekly. Prescription sent to [facility] pharmacy for delivery.    Future Considerations:  Titrate GLP1a as needed    Monitoring and Education:  Provided detailed dosing and administration counseling to ensure proper technique.   Reviewed Wegovy titration schedule, starting with 2.5 mg once weekly to a goal of 2.4 mg once weekly if tolerated  Counseled patient on the benefits of GLP-1ra glycemic control and weight loss  Reviewed storage requirements of Wegovy when not in use, and when to administer the medication if a dose is missed.  Advised patient that they may experience improved satiety after meals and portion sizes of meals may be reduced as doses of Wegovy increase.  Counseled patient on relevant MOA, expectations, side effects, duration of therapy, contraindications, administration, and monitoring parameters.  All questions and concerns addressed. Contact pharmacist with any further questions or concerns prior to next appointment.           Clinical Pharmacist follow-up: 3.11.2025 - 11 am, Telehealth visit    Continue all meds under the continuation of care with the referring provider and clinical pharmacy team.    Thank you,  Prachi Gutierrez, PharmD  Clinical Pharmacist  [phone]    Verbal consent to manage patient's drug therapy was obtained from the patient. They were informed they may decline to participate or withdraw from participation in pharmacy services at any time.

## 2025-02-11 NOTE — ASSESSMENT & PLAN NOTE
Current regimen includes Wegovy 0.5 mg subcutaneously once weekly. Patient's current weight reported as 174 pounds on 1.14.25. Has lost about 10 pounds since starting therapy (~5.43% change in total body weight). Given BMI still above goal, will plan to increase Wegovy dose again today.    Medication Changes:  INCREASE  Wegovy 1 mg subcutaneously once weekly. Prescription sent to [facility] pharmacy for delivery.    Future Considerations:  Titrate GLP1a as needed    Monitoring and Education:  Provided detailed dosing and administration counseling to ensure proper technique.   Reviewed Wegovy titration schedule, starting with 2.5 mg once weekly to a goal of 2.4 mg once weekly if tolerated  Counseled patient on the benefits of GLP-1ra glycemic control and weight loss  Reviewed storage requirements of Wegovy when not in use, and when to administer the medication if a dose is missed.  Advised patient that they may experience improved satiety after meals and portion sizes of meals may be reduced as doses of Wegovy increase.  Counseled patient on relevant MOA, expectations, side effects, duration of therapy, contraindications, administration, and monitoring parameters.  All questions and concerns addressed. Contact pharmacist with any further questions or concerns prior to next appointment.

## 2025-02-13 ENCOUNTER — PHARMACY VISIT (OUTPATIENT)
Dept: PHARMACY | Facility: CLINIC | Age: 70
End: 2025-02-13
Payer: COMMERCIAL

## 2025-02-13 ENCOUNTER — HOSPITAL ENCOUNTER (OUTPATIENT)
Dept: RADIOLOGY | Facility: CLINIC | Age: 70
Discharge: HOME | End: 2025-02-13
Payer: MEDICARE

## 2025-02-13 DIAGNOSIS — N20.0 NEPHROLITHIASIS: ICD-10-CM

## 2025-02-13 PROCEDURE — 74018 RADEX ABDOMEN 1 VIEW: CPT

## 2025-02-17 DIAGNOSIS — N20.0 NEPHROLITHIASIS: Primary | ICD-10-CM

## 2025-02-17 RX ORDER — POTASSIUM CITRATE 15 MEQ/1
30 TABLET, EXTENDED RELEASE ORAL
Qty: 360 TABLET | Refills: 1 | Status: SHIPPED | OUTPATIENT
Start: 2025-02-17 | End: 2025-08-16

## 2025-02-20 ENCOUNTER — OFFICE VISIT (OUTPATIENT)
Dept: URGENT CARE | Age: 70
End: 2025-02-20
Payer: MEDICARE

## 2025-02-20 VITALS
HEART RATE: 91 BPM | WEIGHT: 166 LBS | OXYGEN SATURATION: 99 % | RESPIRATION RATE: 16 BRPM | HEIGHT: 63 IN | SYSTOLIC BLOOD PRESSURE: 106 MMHG | TEMPERATURE: 97.7 F | DIASTOLIC BLOOD PRESSURE: 75 MMHG | BODY MASS INDEX: 29.41 KG/M2

## 2025-02-20 DIAGNOSIS — R30.0 DYSURIA: Primary | ICD-10-CM

## 2025-02-20 DIAGNOSIS — K52.9 GASTROENTERITIS: ICD-10-CM

## 2025-02-20 LAB
POC APPEARANCE, URINE: ABNORMAL
POC BILIRUBIN, URINE: NEGATIVE
POC BLOOD, URINE: ABNORMAL
POC COLOR, URINE: ABNORMAL
POC GLUCOSE, URINE: NEGATIVE MG/DL
POC KETONES, URINE: NEGATIVE MG/DL
POC LEUKOCYTES, URINE: ABNORMAL
POC NITRITE,URINE: POSITIVE
POC PH, URINE: 6 PH
POC PROTEIN, URINE: ABNORMAL MG/DL
POC SPECIFIC GRAVITY, URINE: >=1.03
POC UROBILINOGEN, URINE: 0.2 EU/DL

## 2025-02-20 PROCEDURE — 1160F RVW MEDS BY RX/DR IN RCRD: CPT | Performed by: FAMILY MEDICINE

## 2025-02-20 PROCEDURE — 1159F MED LIST DOCD IN RCRD: CPT | Performed by: FAMILY MEDICINE

## 2025-02-20 PROCEDURE — 1036F TOBACCO NON-USER: CPT | Performed by: FAMILY MEDICINE

## 2025-02-20 PROCEDURE — 99213 OFFICE O/P EST LOW 20 MIN: CPT | Performed by: FAMILY MEDICINE

## 2025-02-20 PROCEDURE — 3008F BODY MASS INDEX DOCD: CPT | Performed by: FAMILY MEDICINE

## 2025-02-20 RX ORDER — NITROFURANTOIN 25; 75 MG/1; MG/1
100 CAPSULE ORAL 2 TIMES DAILY
Qty: 14 CAPSULE | Refills: 0 | Status: SHIPPED | OUTPATIENT
Start: 2025-02-20 | End: 2025-02-27

## 2025-02-20 RX ORDER — LIDOCAINE HYDROCHLORIDE 20 MG/ML
1 JELLY TOPICAL ONCE
Status: COMPLETED | OUTPATIENT
Start: 2025-02-24 | End: 2025-02-24

## 2025-02-20 ASSESSMENT — ENCOUNTER SYMPTOMS
CHEST TIGHTNESS: 0
FREQUENCY: 0
FEVER: 0
COUGH: 0
SHORTNESS OF BREATH: 0
ABDOMINAL PAIN: 0
VOMITING: 1
DIARRHEA: 1
NAUSEA: 1
DYSURIA: 0
CONSTIPATION: 0
CHILLS: 0
WHEEZING: 0

## 2025-02-20 NOTE — PROGRESS NOTES
Subjective   Patient ID: Mikaela Cárdenas is a 69 y.o. female. They present today with a chief complaint of Illness (X 6 days complains of diarrhea, nausea, vomiting, decreased appetite, body aches, chills, sweats, headache. Has not tried any OTC medication.) and Urinary Problem (Cloudy urine has tried increased fluids. Would like urine recheck.).    History of Present Illness  Patient denies any recent travel history, dietary changes or exposure to others with similar symptoms.  Patient is concerned about a possible UTI despite no UTI symptoms.      History provided by:  Patient   used: No    Vomiting  Severity: resolved.  Duration: 1 day from onset 2/14/25.  Number of daily episodes:  2  Onset of vomiting after eating: unassociated.  Progression:  Resolved  Chronicity:  New  Recent urination:  Normal  Ineffective treatments:  None tried  Associated symptoms: diarrhea    Associated symptoms: no abdominal pain, no chills, no cough and no fever    Diarrhea  Quality:  Watery  Severity: 0/10.  Onset quality:  Gradual  Number of episodes:  3-4/day  Duration:  7 days  Timing:  Intermittent  Progression:  Resolved  Ineffective treatments:  None tried  Associated symptoms: vomiting    Associated symptoms: no abdominal pain, no chills and no fever        Past Medical History  Allergies as of 02/20/2025 - Reviewed 02/20/2025   Allergen Reaction Noted    Bactrim [sulfamethoxazole-trimethoprim] Nausea/vomiting 01/20/2025       (Not in a hospital admission)       Past Medical History:   Diagnosis Date    Distal radius fracture     History of cervical dysplasia 2021    [facility] for HGSIL/CIN3    History of mammogram 07/10/2024    cat 1    History of radius fracture     Old myocardial infarction     Pap test, as part of routine gynecological examination 06/2024    wnl, hpv neg--5/2023 wnl, hpv neg--11/2022 wnl, hpv neg       Past Surgical History:   Procedure Laterality Date    CERVICAL BIOPSY  2021    CERVICAL  "CONIZATION   W/ LASER  2021    NOT LASER    COLONOSCOPY  2016    due 2026 (-)    COLPOSCOPY  2021    CORONARY ANGIOPLASTY  2017    CORONARY STENT PLACEMENT  2017    x 4    EYE MUSCLE SURGERY  1960    x 2    FIBULA FRACTURE SURGERY Left 2012    TIBIA FRACTURE SURGERY Left 2012        reports that she has never smoked. She has never been exposed to tobacco smoke. She has never used smokeless tobacco. She reports current alcohol use. She reports that she does not use drugs.    Review of Systems  Review of Systems   Constitutional:  Negative for chills and fever.   Respiratory:  Negative for cough, chest tightness, shortness of breath and wheezing.    Cardiovascular:  Negative for chest pain.   Gastrointestinal:  Positive for diarrhea, nausea and vomiting. Negative for abdominal pain and constipation.   Genitourinary:  Negative for dysuria, frequency and urgency.                                  Objective    Vitals:    02/20/25 0955   BP: 106/75   BP Location: Left arm   Patient Position: Sitting   BP Cuff Size: Small adult   Pulse: 91   Resp: 16   Temp: 36.5 °C (97.7 °F)   TempSrc: Temporal   SpO2: 99%   Weight: 75.3 kg (166 lb)   Height: 1.6 m (5' 3\")     No LMP recorded (lmp unknown). Patient is postmenopausal.    Physical Exam  Vitals reviewed.   Constitutional:       General: She is not in acute distress.     Appearance: She is not ill-appearing.   Cardiovascular:      Rate and Rhythm: Normal rate and regular rhythm.      Heart sounds: No murmur heard.     No friction rub.   Pulmonary:      Effort: Pulmonary effort is normal. No respiratory distress.      Breath sounds: No wheezing, rhonchi or rales.   Abdominal:      General: Abdomen is flat. Bowel sounds are normal.      Palpations: Abdomen is soft. There is no mass.      Tenderness: There is no abdominal tenderness. There is no right CVA tenderness or left CVA tenderness.   Neurological:      Mental Status: She is alert.         Procedures    Point of Care Test " & Imaging Results from this visit  No results found for this visit on 02/20/25.   No results found.    Diagnostic study results (if any) were reviewed by Willard Quintero DO.    Assessment/Plan   Allergies, medications, history, and pertinent labs/EKGs/Imaging reviewed by Willard Quintero DO.         Orders and Diagnoses  There are no diagnoses linked to this encounter.    Medical Admin Record      Patient disposition: Home    Electronically signed by Willard Quintero DO  10:05 AM

## 2025-02-20 NOTE — LETTER
February 20, 2025     Patient: Mikaela Cárdenas   YOB: 1955   Date of Visit: 2/20/2025       To Whom It May Concern:    Mikaela Cárdenas was seen in my clinic on 2/20/2025 at 9:45 am. Please excuse Mikaela for her absence from work on this day to make the appointment.    If you have any questions or concerns, please don't hesitate to call.         Sincerely,         Willard Quintero,         CC: No Recipients

## 2025-02-22 LAB — BACTERIA UR CULT: ABNORMAL

## 2025-02-24 ENCOUNTER — APPOINTMENT (OUTPATIENT)
Dept: UROLOGY | Facility: CLINIC | Age: 70
End: 2025-02-24
Payer: MEDICARE

## 2025-02-24 VITALS
DIASTOLIC BLOOD PRESSURE: 85 MMHG | WEIGHT: 166 LBS | BODY MASS INDEX: 29.41 KG/M2 | HEART RATE: 90 BPM | HEIGHT: 63 IN | SYSTOLIC BLOOD PRESSURE: 144 MMHG

## 2025-02-24 DIAGNOSIS — N20.0 NEPHROLITHIASIS: ICD-10-CM

## 2025-02-24 DIAGNOSIS — R82.90 ABNORMAL URINALYSIS: ICD-10-CM

## 2025-02-24 DIAGNOSIS — R31.9 HEMATURIA, UNSPECIFIED TYPE: Primary | ICD-10-CM

## 2025-02-24 DIAGNOSIS — R79.1 ABNORMAL COAGULATION PROFILE: ICD-10-CM

## 2025-02-24 LAB
POC APPEARANCE, URINE: CLEAR
POC BILIRUBIN, URINE: NEGATIVE
POC BLOOD, URINE: ABNORMAL
POC COLOR, URINE: YELLOW
POC GLUCOSE, URINE: NEGATIVE MG/DL
POC KETONES, URINE: NEGATIVE MG/DL
POC LEUKOCYTES, URINE: ABNORMAL
POC NITRITE,URINE: NEGATIVE
POC PH, URINE: 5.5 PH
POC PROTEIN, URINE: ABNORMAL MG/DL
POC SPECIFIC GRAVITY, URINE: 1.02
POC UROBILINOGEN, URINE: 0.2 EU/DL

## 2025-02-24 PROCEDURE — 99214 OFFICE O/P EST MOD 30 MIN: CPT | Performed by: UROLOGY

## 2025-02-24 PROCEDURE — 81003 URINALYSIS AUTO W/O SCOPE: CPT | Performed by: UROLOGY

## 2025-02-24 PROCEDURE — 52000 CYSTOURETHROSCOPY: CPT | Performed by: UROLOGY

## 2025-02-24 RX ORDER — CIPROFLOXACIN 500 MG/1
500 TABLET ORAL 2 TIMES DAILY
Qty: 14 TABLET | Refills: 0 | Status: SHIPPED | OUTPATIENT
Start: 2025-02-24 | End: 2025-03-03

## 2025-02-24 RX ORDER — ONDANSETRON 4 MG/1
4 TABLET, FILM COATED ORAL EVERY 8 HOURS PRN
Qty: 20 TABLET | Refills: 0 | Status: SHIPPED | OUTPATIENT
Start: 2025-02-24 | End: 2025-03-03

## 2025-02-24 RX ORDER — CEFAZOLIN SODIUM 2 G/100ML
2 INJECTION, SOLUTION INTRAVENOUS ONCE
OUTPATIENT
Start: 2025-02-24 | End: 2025-02-24

## 2025-02-24 RX ORDER — ACETAMINOPHEN 325 MG/1
975 TABLET ORAL ONCE
OUTPATIENT
Start: 2025-02-24 | End: 2025-02-24

## 2025-02-24 RX ADMIN — LIDOCAINE HYDROCHLORIDE 1 APPLICATION: 20 JELLY TOPICAL at 09:15

## 2025-02-24 NOTE — PROGRESS NOTES
CHIEF COMPLAINT:  1. Gross hematuria  2. Recurrent UTIs  3. Bilateral nephrolithiasis    HPI TODAY: 02/24/2025:  Presents with episode of gross hematuria within past 2-3 months, believed to be associated with stone passage. History of uric acid stones previously managed with potassium citrate. Recent urine culture on 02/20/2025 positive for E. coli, with previous culture in February also showing E. coli. UA with microscopy showed RBCs and WBCs. Cytology negative.    Nephrolithiasis:  - CT urogram on 02/03/2025 shows bilateral non-obstructing stones  - Left: 8mm midpole stone and 1.7cm branching lower pole stone  - Right: Multiple smaller non-obstructing stones  - Stone size increased from previous 5mm to current 20mm  - Associated with polycystic kidney disease and polycystic liver disease  - Previously responded to potassium citrate therapy  - Currently symptomatic with recurrent UTIs and hematuria    PAST MEDICAL HISTORY:  - Polycystic kidney disease  - Polycystic liver disease  - History of uric acid stones  - No smoking history  - Current medications: sertraline, semaglutide (Wegovy)    PHYSICAL EXAMINATION:  Gen: NAD  Pulm: No increased WOB on RA  Cards: [facility]    Patient ID: Mikaela Cárdenas is a 69 y.o. female.    Cystoscopy    Date/Time: 2/24/2025 9:48 AM    Performed by: Stewart Reese MD  Authorized by: Stewart Reese MD      Comments:      The R/B/A to the following procedure were discussed and an informed consent was signed. A time-out was performed confirming the correct procedure, laterality (if applicable), and plan.    The patient was prepped and draped in the standard sterile fashion. A 16 K[place]z flexible cystoscope was inserted through the urethra. The following finding were noted:    Urethra -normal  Bladder mucosa-cystitis cystica, otherwise normal without tumors or stones  Ureteral orifices -visualized in orthotopic position    The patient tolerated the procedure  well.          ASSESSMENT/PLAN:    Left Nephrolithiasis (N20.0)  - Assessment: Symptomatic left renal stones with 1.7cm branching lower pole stone and 8mm midpole stone, complicated by recurrent UTIs  - Plan: Proceed with left percutaneous nephrolithotomy (PCNL). Pre-operative testing to include CBC, BMP. Hold semaglutide one week prior to surgery. Prescribe Zofran for current nausea. Resume potassium citrate post-operatively for stone prevention. Consider vaginal estrogen therapy post-operatively for UTI prevention  - Counseling: Risks, benefits, and alternatives were discussed including but not limited to bleeding requiring transfusion, injury to surrounding structures including lung and bowel, infection. Post-operative restrictions include no lifting >30 pounds for one month, return to work in 1 week. Temporary ureteral stent placement discussed including associated symptoms    Recurrent UTIs (N39.0)  - Assessment: Recurrent E. coli UTIs likely related to stone burden  - Plan: Continue current antibiotic course. Definitive management with stone removal  - Cipro x 7d pre-op  - Zofran 4mg po prn, discussed SE w/ SSRIs    The patient provided verbal consent for the audio recording of today's encounter using AI.

## 2025-03-11 ENCOUNTER — APPOINTMENT (OUTPATIENT)
Dept: PHARMACY | Facility: HOSPITAL | Age: 70
End: 2025-03-11
Payer: MEDICARE

## 2025-03-11 PROCEDURE — RXMED WILLOW AMBULATORY MEDICATION CHARGE

## 2025-03-11 RX ORDER — SEMAGLUTIDE 1 MG/.5ML
1 INJECTION, SOLUTION SUBCUTANEOUS WEEKLY
Qty: 2 ML | Refills: 1 | Status: SHIPPED | OUTPATIENT
Start: 2025-03-11 | End: 2025-04-30

## 2025-03-11 NOTE — PROGRESS NOTES
Clinical Pharmacy Appointment    Patient ID: Mikaela Cárdenas is a 69 y.o. female who presents for Weight Loss.    Referring Provider: Daija Spencer, *  PCP: Daija Spencer MD   Last visit with PCP: 11.11.2024   Next visit with PCP: Not scheduled     Patient Assistance for Wegovy approved through 12/2/2025. Will have to be renewed prior to that date to prevent lapse in coverage. Medication(s) will be received at no cost to patient from [facility].    Subjective   Interval History:  4/4 - having surgery: nephrolithotomy  Will need to stop Wegovy 1 week prior to surgery  Continues with Wegovy 1 mg  Has lost an additional 6 pounds since last RPH appt  GI symptoms resolved  Reports appetite suppression continues  Just bought new pants - applauded patient for continued efforts    HPI  WEIGHT LOSS  BMI Readings from Last 3 Encounters:   02/24/25 29.41 kg/m²   02/20/25 29.41 kg/m²   01/20/25 31.35 kg/m²      Starting weight: 184 lbs. (11.11.2024)  December weight: 178 lbs. (12.24.2024)  January weight: 174 lbs (1.14.2025)  Current weight: 168 lbs. (3.11.2025)    Lifestyle  Diet: Using Weight Watchers currently    Non-Pharmacological Therapy  Weight loss techniques attempted:  Self-directed dieting: Weight Watchers    Pharmacological Therapy  Current Medications: Wegovy 1 mg subcutaneously once weekly  Adverse Effects: Denies    Insurance coverage of weight-loss medications? Yes, Medicare covers for reduction of cardiovascular disease   Eligible for copay cards/programs? No, government funded insurance  Eligible for  PAP? Yes, reports income <400% FPL    Drug Interactions  No relevant drug interactions were noted.    Objective   Allergies   Allergen Reactions    Bactrim [Sulfamethoxazole-Trimethoprim] Nausea/vomiting     Pt states she does not have an allergy.  It made her nauseous.     Social History     Social History Narrative    Not on file      Medication Review  Current Outpatient  "Medications   Medication Instructions    alendronate (FOSAMAX) 70 mg, oral, Every 7 days, Take in the morning with a full glass of water, on an empty stomach, and do not take anything else by mouth or lie down for the next 30 min.    ALPRAZolam (XANAX) 0.5 mg, oral, 2 times daily PRN    aspirin 81 mg EC tablet 1 tablet, Daily    atorvastatin (LIPITOR) 80 mg, oral, Daily    lisinopril 5 mg, oral, Daily    metoprolol succinate XL (TOPROL-XL) 25 mg, oral, Daily    nitroglycerin (NITROSTAT) 0.4 mg, sublingual, Every 5 min PRN, FOR CHEST PAIN    potassium citrate CR (Urocit-K-15) 15 mEq ER tablet 30 mEq, oral, 2 times daily (morning and late afternoon)    sertraline (ZOLOFT) 100 mg, oral, Daily    traZODone (DESYREL) 50 mg, oral, Nightly PRN    Wegovy 1 mg, subcutaneous, Weekly      Vitals  BP Readings from Last 2 Encounters:   02/24/25 144/85   02/20/25 106/75     BMI Readings from Last 1 Encounters:   02/24/25 29.41 kg/m²      Labs  A1C  Lab Results   Component Value Date    HGBA1C 5.4 11/11/2024    HGBA1C 5.2 05/07/2024    HGBA1C 5.4 05/09/2023     BMP  Lab Results   Component Value Date    CALCIUM 9.4 01/27/2025     01/27/2025    K 4.5 01/27/2025    CO2 27 01/27/2025     01/27/2025    BUN 24 01/27/2025    CREATININE 0.79 01/27/2025    EGFR 81 01/27/2025     LFTs  Lab Results   Component Value Date    ALT 21 11/11/2024    AST 15 11/11/2024    ALKPHOS 91 11/11/2024    BILITOT 0.4 11/11/2024     FLP  Lab Results   Component Value Date    TRIG 98 05/07/2024    CHOL 125 05/07/2024    LDLF 66 05/09/2023    LDLCALC 65 05/07/2024    HDL 40.9 05/07/2024     Urine Microalbumin  No results found for: \"MICROALBCREA\"  Weight Management  Wt Readings from Last 3 Encounters:   02/24/25 75.3 kg (166 lb)   02/20/25 75.3 kg (166 lb)   01/20/25 80.3 kg (177 lb)      There is no height or weight on file to calculate BMI.     Assessment/Plan   Problem List Items Addressed This Visit       BMI 33.0-33.9,adult     Current " regimen includes Wegovy 1 mg subcutaneously once weekly. Patient's current weight reported as 168 pounds on 3.11.25. Has lost about 16 pounds since starting therapy (~8.7% change in total body weight). Will plan to continue current therapy today as patient still losing weight on current dose and reporting appetite suppression. Will aim to reach maintenance dose at follow-up.    Medication Changes:  CONTINUE  Wegovy 1 mg subcutaneously once weekly. Prescription sent to [facility] pharmacy for delivery.    Future Considerations:  Titrate GLP1a as needed    Monitoring and Education:  Provided detailed dosing and administration counseling to ensure proper technique.   Reviewed Wegovy titration schedule, starting with 2.5 mg once weekly to a goal of 2.4 mg once weekly if tolerated  Counseled patient on the benefits of GLP-1ra glycemic control and weight loss  Reviewed storage requirements of Wegovy when not in use, and when to administer the medication if a dose is missed.  Advised patient that they may experience improved satiety after meals and portion sizes of meals may be reduced as doses of Wegovy increase.  Counseled patient on relevant MOA, expectations, side effects, duration of therapy, contraindications, administration, and monitoring parameters.  All questions and concerns addressed. Contact pharmacist with any further questions or concerns prior to next appointment.           Clinical Pharmacist follow-up: 4.15.2025 - 11 am, Telehealth visit    Continue all meds under the continuation of care with the referring provider and clinical pharmacy team.    Thank you,  Prachi Gutierrez, PharmD  Clinical Pharmacist  [phone]    Verbal consent to manage patient's drug therapy was obtained from the patient. They were informed they may decline to participate or withdraw from participation in pharmacy services at any time.

## 2025-03-11 NOTE — ASSESSMENT & PLAN NOTE
Current regimen includes Wegovy 1 mg subcutaneously once weekly. Patient's current weight reported as 168 pounds on 3.11.25. Has lost about 16 pounds since starting therapy (~8.7% change in total body weight). Will plan to continue current therapy today as patient still losing weight on current dose and reporting appetite suppression. Will aim to reach maintenance dose at follow-up.    Medication Changes:  CONTINUE  Wegovy 1 mg subcutaneously once weekly. Prescription sent to [facility] pharmacy for delivery.    Future Considerations:  Titrate GLP1a as needed    Monitoring and Education:  Provided detailed dosing and administration counseling to ensure proper technique.   Reviewed Wegovy titration schedule, starting with 2.5 mg once weekly to a goal of 2.4 mg once weekly if tolerated  Counseled patient on the benefits of GLP-1ra glycemic control and weight loss  Reviewed storage requirements of Wegovy when not in use, and when to administer the medication if a dose is missed.  Advised patient that they may experience improved satiety after meals and portion sizes of meals may be reduced as doses of Wegovy increase.  Counseled patient on relevant MOA, expectations, side effects, duration of therapy, contraindications, administration, and monitoring parameters.  All questions and concerns addressed. Contact pharmacist with any further questions or concerns prior to next appointment.

## 2025-03-13 ENCOUNTER — PHARMACY VISIT (OUTPATIENT)
Dept: PHARMACY | Facility: CLINIC | Age: 70
End: 2025-03-13
Payer: COMMERCIAL

## 2025-03-17 DIAGNOSIS — N20.0 NEPHROLITHIASIS: ICD-10-CM

## 2025-03-17 ASSESSMENT — LIFESTYLE VARIABLES: SMOKING_STATUS: NONSMOKER

## 2025-03-17 ASSESSMENT — DUKE ACTIVITY SCORE INDEX (DASI)
CAN YOU WALK A BLOCK OR TWO ON LEVEL GROUND: YES
CAN YOU DO YARD WORK LIKE RAKING LEAVES, WEEDING OR PUSHING A MOWER: YES
CAN YOU HAVE SEXUAL RELATIONS: YES
CAN YOU DO MODERATE WORK AROUND THE HOUSE LIKE VACUUMING, SWEEPING FLOORS OR CARRYING GROCERIES: YES
CAN YOU RUN A SHORT DISTANCE: NO
CAN YOU DO LIGHT WORK AROUND THE HOUSE LIKE DUSTING OR WASHING DISHES: YES
TOTAL_SCORE: 42.7
CAN YOU PARTICIPATE IN MODERATE RECREATIONAL ACTIVITIES LIKE GOLF, BOWLING, DANCING, DOUBLES TENNIS OR THROWING A BASEBALL OR FOOTBALL: YES
CAN YOU TAKE CARE OF YOURSELF (EAT, DRESS, BATHE, OR USE TOILET): YES
CAN YOU CLIMB A FLIGHT OF STAIRS OR WALK UP A HILL: YES
CAN YOU WALK INDOORS, SUCH AS AROUND YOUR HOUSE: YES
DASI METS SCORE: 8
CAN YOU DO HEAVY WORK AROUND THE HOUSE LIKE SCRUBBING FLOORS OR LIFTING AND MOVING HEAVY FURNITURE: YES
CAN YOU PARTICIPATE IN STRENOUS SPORTS LIKE SWIMMING, SINGLES TENNIS, FOOTBALL, BASKETBALL, OR SKIING: NO

## 2025-03-17 ASSESSMENT — ACTIVITIES OF DAILY LIVING (ADL): ADL_SCORE: 0

## 2025-03-18 ENCOUNTER — PRE-ADMISSION TESTING (OUTPATIENT)
Dept: PREADMISSION TESTING | Facility: HOSPITAL | Age: 70
End: 2025-03-18
Payer: MEDICARE

## 2025-03-18 ENCOUNTER — LAB (OUTPATIENT)
Dept: LAB | Facility: HOSPITAL | Age: 70
End: 2025-03-18
Payer: MEDICARE

## 2025-03-18 VITALS
SYSTOLIC BLOOD PRESSURE: 130 MMHG | WEIGHT: 171.08 LBS | RESPIRATION RATE: 18 BRPM | HEIGHT: 63 IN | BODY MASS INDEX: 30.31 KG/M2 | HEART RATE: 61 BPM | OXYGEN SATURATION: 97 % | DIASTOLIC BLOOD PRESSURE: 60 MMHG | TEMPERATURE: 97.7 F

## 2025-03-18 DIAGNOSIS — R82.90 ABNORMAL URINALYSIS: ICD-10-CM

## 2025-03-18 DIAGNOSIS — R79.1 ABNORMAL COAGULATION PROFILE: Primary | ICD-10-CM

## 2025-03-18 DIAGNOSIS — R82.90 UNSPECIFIED ABNORMAL FINDINGS IN URINE: ICD-10-CM

## 2025-03-18 DIAGNOSIS — R31.9 HEMATURIA, UNSPECIFIED TYPE: ICD-10-CM

## 2025-03-18 DIAGNOSIS — Z01.818 PREOP EXAMINATION: Primary | ICD-10-CM

## 2025-03-18 DIAGNOSIS — N20.0 CALCULUS OF KIDNEY: ICD-10-CM

## 2025-03-18 DIAGNOSIS — N20.0 NEPHROLITHIASIS: ICD-10-CM

## 2025-03-18 LAB
ANION GAP SERPL CALC-SCNC: 12 MMOL/L (ref 10–20)
APPEARANCE UR: ABNORMAL
APTT PPP: 29 SECONDS (ref 26–36)
ATRIAL RATE: 58 BPM
BACTERIA #/AREA URNS AUTO: ABNORMAL /HPF
BILIRUB UR STRIP.AUTO-MCNC: NEGATIVE MG/DL
BUN SERPL-MCNC: 22 MG/DL (ref 6–23)
CALCIUM SERPL-MCNC: 9.1 MG/DL (ref 8.6–10.3)
CHLORIDE SERPL-SCNC: 107 MMOL/L (ref 98–107)
CO2 SERPL-SCNC: 26 MMOL/L (ref 21–32)
COLOR UR: YELLOW
CREAT SERPL-MCNC: 0.83 MG/DL (ref 0.5–1.05)
EGFRCR SERPLBLD CKD-EPI 2021: 76 ML/MIN/1.73M*2
ERYTHROCYTE [DISTWIDTH] IN BLOOD BY AUTOMATED COUNT: 14.6 % (ref 11.5–14.5)
GLUCOSE SERPL-MCNC: 93 MG/DL (ref 74–99)
GLUCOSE UR STRIP.AUTO-MCNC: NORMAL MG/DL
HCT VFR BLD AUTO: 35.6 % (ref 36–46)
HGB BLD-MCNC: 12.2 G/DL (ref 12–16)
INR PPP: 1 (ref 0.9–1.1)
KETONES UR STRIP.AUTO-MCNC: NEGATIVE MG/DL
LEUKOCYTE ESTERASE UR QL STRIP.AUTO: ABNORMAL
MCH RBC QN AUTO: 31 PG (ref 26–34)
MCHC RBC AUTO-ENTMCNC: 34.3 G/DL (ref 32–36)
MCV RBC AUTO: 90 FL (ref 80–100)
MUCOUS THREADS #/AREA URNS AUTO: ABNORMAL /LPF
NITRITE UR QL STRIP.AUTO: ABNORMAL
NRBC BLD-RTO: 0 /100 WBCS (ref 0–0)
P AXIS: 49 DEGREES
P OFFSET: 177 MS
P ONSET: 121 MS
PH UR STRIP.AUTO: 5.5 [PH]
PLATELET # BLD AUTO: 169 X10*3/UL (ref 150–450)
POTASSIUM SERPL-SCNC: 4.1 MMOL/L (ref 3.5–5.3)
PR INTERVAL: 196 MS
PROT UR STRIP.AUTO-MCNC: ABNORMAL MG/DL
PROTHROMBIN TIME: 11.3 SECONDS (ref 9.8–12.4)
Q ONSET: 219 MS
QRS COUNT: 9 BEATS
QRS DURATION: 88 MS
QT INTERVAL: 424 MS
QTC CALCULATION(BAZETT): 416 MS
QTC FREDERICIA: 419 MS
R AXIS: -20 DEGREES
RBC # BLD AUTO: 3.94 X10*6/UL (ref 4–5.2)
RBC # UR STRIP.AUTO: ABNORMAL MG/DL
RBC #/AREA URNS AUTO: >20 /HPF
SODIUM SERPL-SCNC: 141 MMOL/L (ref 136–145)
SP GR UR STRIP.AUTO: 1.02
SQUAMOUS #/AREA URNS AUTO: ABNORMAL /HPF
T AXIS: 17 DEGREES
T OFFSET: 431 MS
UROBILINOGEN UR STRIP.AUTO-MCNC: NORMAL MG/DL
VENTRICULAR RATE: 58 BPM
WBC # BLD AUTO: 6.5 X10*3/UL (ref 4.4–11.3)
WBC #/AREA URNS AUTO: >50 /HPF
WBC CLUMPS #/AREA URNS AUTO: ABNORMAL /HPF

## 2025-03-18 PROCEDURE — 99202 OFFICE O/P NEW SF 15 MIN: CPT

## 2025-03-18 PROCEDURE — 81001 URINALYSIS AUTO W/SCOPE: CPT

## 2025-03-18 PROCEDURE — 87086 URINE CULTURE/COLONY COUNT: CPT | Mod: STJLAB | Performed by: UROLOGY

## 2025-03-18 PROCEDURE — 93005 ELECTROCARDIOGRAM TRACING: CPT

## 2025-03-18 ASSESSMENT — PAIN SCALES - GENERAL: PAINLEVEL_OUTOF10: 0 - NO PAIN

## 2025-03-18 ASSESSMENT — PAIN - FUNCTIONAL ASSESSMENT: PAIN_FUNCTIONAL_ASSESSMENT: 0-10

## 2025-03-18 NOTE — CPM/PAT H&P
CPM/PAT Evaluation       Name: Mikaela Cárdenas (Mikaela Cárdenas)  /Age: 1955/69 y.o.     In-Person       Chief Complaint: Kidney stone     HPI  Pleasant 68 y/o female presents with hematuria, unspecified type, nephrolithiasis scheduled for nephrolithotomy-miniPCNL, left on 25. States she was having UTIs and hematuria and this is how she found out about kidney stone. Denies current urinary symptoms or hematuria. Denies flank pain. Denies recent fever/illness/chills.     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    History of mammogram 07/10/2024    cat 1    History of radius fracture     Hypertension     Nephrolithiasis     Old myocardial infarction     Pap test, as part of routine gynecological examination 2024    wnl, hpv neg--2023 wnl, hpv neg--2022 wnl, hpv neg       Past Surgical History:   Procedure Laterality Date    CERVICAL BIOPSY      CERVICAL CONIZATION   W/ LASER      NOT LASER    COLONOSCOPY  2016    due  (-)    COLPOSCOPY      CORONARY ANGIOPLASTY  2017    CORONARY STENT PLACEMENT      x 4    EYE MUSCLE SURGERY  1960    x 2    FIBULA FRACTURE SURGERY Left 2012    TIBIA FRACTURE SURGERY Left 2012    WRIST SURGERY Right        Patient  has no history on file for sexual activity.    Family History   Problem Relation Name Age of Onset    Heart disease Mother      Other (cabg) Mother      Other (cardiac disorder) Mother      Heart disease Father      Other (cardiac disorder) Father      Heart disease Brother      Other (cabg) Brother         Allergies   Allergen Reactions    Bactrim [Sulfamethoxazole-Trimethoprim] Nausea/vomiting       Prior to Admission medications    Medication Sig Start Date End Date Taking? Authorizing Provider   alendronate (Fosamax) 70 mg tablet Take 1 tablet (70 mg) by mouth every 7 days. Take in the morning with a full glass of water, on an  empty stomach, and do not take anything else by mouth or lie down for the next 30 min. 10/11/24   Jt Odell, DO   ALPRAZolam (Xanax) 0.5 mg tablet Take 1 tablet (0.5 mg) by mouth 2 times a day as needed for sleep. 4/1/23   Daija Spencer MD   aspirin 81 mg EC tablet Take 1 tablet (81 mg) by mouth once daily. 4/14/22   Historical Provider, MD   atorvastatin (Lipitor) 80 mg tablet Take 1 tablet (80 mg) by mouth once daily. 8/16/24   Victor Manuel Lazaro,    lisinopril 5 mg tablet Take 1 tablet (5 mg) by mouth once daily. 8/16/24 8/16/25  Victor Manuel Lazaro, DO   metoprolol succinate XL (Toprol-XL) 25 mg 24 hr tablet Take 1 tablet (25 mg) by mouth once daily. 8/16/24 8/16/25  Victor Manuel Lazaro, DO   nitroglycerin (Nitrostat) 0.4 mg SL tablet Place 1 tablet (0.4 mg) under the tongue every 5 minutes if needed for chest pain. FOR CHEST PAIN 4/1/23   Daija Spencer MD   potassium citrate CR (Urocit-K-15) 15 mEq ER tablet Take 2 tablets (30 mEq) by mouth 2 times daily (morning and late afternoon). 2/17/25 8/16/25  Elizabeth Wasserman, APRN-CNP   semaglutide, weight loss, (Wegovy) 1 mg/0.5 mL pen injector Inject 1 mg under the skin 1 (one) time per week for 8 doses. 3/11/25 4/30/25  Daija Spencer MD   sertraline (Zoloft) 100 mg tablet Take 1 tablet (100 mg) by mouth once daily. 11/11/24   Daija Spencer MD   traZODone (Desyrel) 50 mg tablet Take 1 tablet (50 mg) by mouth as needed at bedtime for sleep. 11/11/24   Daija Spencer MD        Constitutional: Negative for fever, chills, or sweats   ENMT: Negative for nasal discharge, congestion, ear pain, mouth pain, throat pain. Positive for glasses. Positive for upper dentures.   Respiratory: Negative for cough, wheezing, shortness of breath   Cardiac: Negative for chest pain, dyspnea on exertion, palpitations   Gastrointestinal: Negative for nausea, vomiting, diarrhea, constipation, abdominal pain  Genitourinary: Negative for dysuria, flank  pain, frequency, hematuria. See HPI   Musculoskeletal: Negative for decreased ROM, pain, swelling, weakness   Neurological: Negative for dizziness, confusion, headache  Psychiatric: Negative for mood changes   Skin: Negative for itching, rash, ulcer    Hematologic/Lymph: Negative for bruising, easy bleeding  Allergic/Immunologic: Negative itching, sneezing, swelling      Physical Exam  Vitals reviewed.   Constitutional:       Appearance: Normal appearance.   HENT:      Head: Normocephalic.      Mouth/Throat:      Mouth: Mucous membranes are moist.      Pharynx: Oropharynx is clear.   Eyes:      Pupils: Pupils are equal, round, and reactive to light.   Cardiovascular:      Rate and Rhythm: Normal rate and regular rhythm.      Heart sounds: Normal heart sounds.   Pulmonary:      Effort: Pulmonary effort is normal.      Breath sounds: Normal breath sounds.   Abdominal:      General: Bowel sounds are normal.      Palpations: Abdomen is soft.   Musculoskeletal:         General: Normal range of motion.      Cervical back: Normal range of motion.   Skin:     General: Skin is warm and dry.   Neurological:      General: No focal deficit present.      Mental Status: She is alert and oriented to person, place, and time.   Psychiatric:         Mood and Affect: Mood normal.         Behavior: Behavior normal.          PAT AIRWAY:   Airway:     Mallampati::  II    Neck ROM::  Full  normal        Testing/Diagnostic:   Nuclear stress test: IMPRESSION:  Normal exercise Myoview cardiac perfusion stress test.  No evidence of ischemia or myocardial infarction by perfusion imaging.  Normal left ventricular systolic function, ejection fraction 65%.  No exercise provoked significant ischemic ECG changes or chest pain  symptoms.  When compared to a study from 2019, no significant interval changes  are seen.    Patient Specialist/PCP:   PCP: Dr. Spencer   Cardiology: Dr. Lazrao     Visit Vitals  /60   Pulse 61   Temp 36.5 °C (97.7  "°F) (Temporal)   Resp 18   Ht 1.6 m (5' 3\")   Wt 77.6 kg (171 lb 1.2 oz)   LMP  (LMP Unknown)   SpO2 97%   BMI 30.30 kg/m²   OB Status Postmenopausal   Smoking Status Never   BSA 1.86 m²       DASI Risk Score      Flowsheet Row Pre-Admission Testing from 3/18/2025 in [facility]   Can you take care of yourself (eat, dress, bathe, or use toilet)?  2.75 filed at 03/17/2025 1333   Can you walk indoors, such as around your house? 1.75 filed at 03/17/2025 1333   Can you walk a block or two on level ground?  2.75 filed at 03/17/2025 1333   Can you climb a flight of stairs or walk up a hill? 5.5 filed at 03/17/2025 1333   Can you run a short distance? 0 filed at 03/17/2025 1333   Can you do light work around the house like dusting or washing dishes? 2.7 filed at 03/17/2025 1333   Can you do moderate work around the house like vacuuming, sweeping floors or carrying groceries? 3.5 filed at 03/17/2025 1333   Can you do heavy work around the house like scrubbing floors or lifting and moving heavy furniture?  8 filed at 03/17/2025 1333   Can you do yard work like raking leaves, weeding or pushing a mower? 4.5 filed at 03/17/2025 1333   Can you have sexual relations? 5.25 filed at 03/17/2025 1333   Can you participate in moderate recreational activities like golf, bowling, dancing, doubles tennis or throwing a baseball or football? 6 filed at 03/17/2025 1333   Can you participate in strenous sports like swimming, singles tennis, football, basketball, or skiing? 0 filed at 03/17/2025 1333   DASI SCORE 42.7 filed at 03/17/2025 1333   METS Score (Will be calculated only when all the questions are answered) 8 filed at 03/17/2025 1333          Caprini DVT Assessment      Flowsheet Row Pre-Admission Testing from 3/18/2025 in [facility]   DVT Score (IF A SCORE IS NOT CALCULATING, MUST SELECT A BMI TO COMPLETE) 6 filed at 03/17/2025 1333   Medical Factors Acute myocardial infarction filed at 03/17/2025 " 1333   Surgical Factors Major surgery planned, including arthroscopic and laproscopic (1-2 hours) filed at 03/17/2025 1333   BMI (BMI MUST BE CHOSEN) 30 or less filed at 03/17/2025 1333          Modified Frailty Index      Flowsheet Row Pre-Admission Testing from 3/18/2025 in [facility]   Non-independent functional status (problems with dressing, bathing, personal grooming, or cooking) 0 filed at 03/17/2025 1334   History of diabetes mellitus  0 filed at 03/17/2025 1334   History of COPD 0 filed at 03/17/2025 1334   History of CHF No filed at 03/17/2025 1334   History of MI 0.0909 filed at 03/17/2025 1334   History of Percutaneous Coronary Intervention, Cardiac Surgery, or Angina No filed at 03/17/2025 1334   Hypertension requiring the use of medication  0.0909 filed at 03/17/2025 1334   Peripheral vascular disease 0 filed at 03/17/2025 1334   Impaired sensorium (cognitive impairement or loss, clouding, or delirium) 0 filed at 03/17/2025 1334   TIA or CVA withouy residual deficit 0 filed at 03/17/2025 1334   Cerebrovascular accident with deficit 0 filed at 03/17/2025 1334   Modified Frailty Index Calculator .1818 filed at 03/17/2025 1334          [id number] Stroke Risk Points  Current as of just now        N/A 0 to 9 Points:      Last Change: N/A          The [id number] risk score (Lip GH, et al. 2009. © 2010 American College [employer]) quantifies the risk of stroke for a patient with atrial fibrillation. For patients without atrial fibrillation or under the age of 18 this score appears as N/A. Higher score values generally indicate higher risk of stroke.        This score is not applicable to this patient. Components are not calculated.          Revised Cardiac Risk Index      Flowsheet Row Pre-Admission Testing from 3/18/2025 in [facility]   High-Risk Surgery (Intraperitoneal, Intrathoracic,Suprainguinal vascular) 0 filed at 03/17/2025 1334   History of ischemic heart  disease (History of MI, History of positive exercuse test, Current chest paint considered due to myocardial ischemia, Use of nitrate therapy, ECG with pathological Q Waves) 1 filed at 03/17/2025 1334   History of congestive heart failure (pulmonary edemia, bilateral rales or S3 gallop, Paroxysmal nocturnal dyspnea, CXR showing pulmonary vascular redistribution) 0 filed at 03/17/2025 1334   History of cerebrovascular disease (Prior TIA or stroke) 0 filed at 03/17/2025 1334   Pre-operative insulin treatment 0 filed at 03/17/2025 1334   Pre-operative creatinine>2 mg/dl 0 filed at 03/17/2025 1334   Revised Cardiac Risk Calculator 1 filed at 03/17/2025 1334          Apfel Simplified Score      Flowsheet Row Pre-Admission Testing from 3/18/2025 in [facility]   Smoking status 1 filed at 03/17/2025 1334   History of motion sickness or PONV  0 filed at 03/17/2025 1334   Use of postoperative opioids 1 filed at 03/17/2025 1334   Gender - Female 1=Yes filed at 03/17/2025 1334   Apfel Simplified Score Calculator 3 filed at 03/17/2025 1334          Risk Analysis Index Results This Encounter         3/17/2025  1334             Do you live in a place other than your own home?: 0    When did you begin living in the place you are currently residing?: Greater than one year ago    Any kidney failure, kidney not working well, or seeing a kidney doctor (nephrologist)? If yes, was this for kidney stones or another problem?: 1 Kidney stones    Any history of chronic (long-term) congestive heart failure (CHF)?: 0 No    Any shortness of breath when resting?: 0 No    In the past five years, have you been diagnosed with or treated for cancer?: No    During the last 3 months has it become difficult for you to remember things or organize your thoughts?: 0 No    Have you lost weight of 10 pounds or more in the past 3 months without trying?: 0 No    Do you have any loss of appetitie?: 0 No    Getting Around (Mobility): 0 Can get  around without help    Eatin Can plan and prepare own meals    Toiletin Can use toilet without any help    Personal Hygiene (Bathing, Hand Washing, Changing Clothes): 0 Can shower or bathe without any help    TONG Cancer History: Patient does not indicate history of cancer    Total Risk Analysis Index Score Without Cancer: 21    Total Risk Analysis Index Score: 21          Stop Bang Score      Flowsheet Row Pre-Admission Testing from 3/18/2025 in [facility]   Do you snore loudly? 0 filed at 2025 1333   Do you often feel tired or fatigued after your sleep? 0 filed at 2025 1333   Has anyone ever observed you stop breathing in your sleep? 0 filed at 2025 1333   Do you have or are you being treated for high blood pressure? 1 filed at 2025 1333   Recent BMI (Calculated) 29.4 filed at 2025 1333   Is BMI greater than 35 kg/m2? 0=No filed at 2025 1333   Age older than 50 years old? 1=Yes filed at 2025 1333   Is your neck circumference greater than 17 inches (Male) or 16 inches (Female)? 0 filed at 2025 1333   Gender - Male 0=No filed at 2025 1333   STOP-BANG Total Score 2 filed at 2025 1333          Prodigy: High Risk  Total Score: 8              Prodigy Age Score           ARISCAT Score for Postoperative Pulmonary Complications      Flowsheet Row Pre-Admission Testing from 3/18/2025 in [facility]   Age Calculated Score 3 filed at 2025 1334   Preoperative SpO2 0 filed at 2025 1334   Respiratory infection in the last month Either upper or lower (i.e., URI, bronchitis, pneumonia), with fever and antibiotic treatment 0 filed at 2025 1334   Preoperative anemia (Hgb less than 10 g/dl) 0 filed at 2025 1334   Surgical incision  0 filed at 2025 1334   Duration of surgery  0 filed at 2025 1334   Emergency Procedure  0 filed at 2025 1334   ARISCAT Total Score  3 filed at 2025 3504           Stiven Perioperative Risk for Myocardial Infarction or Cardiac Arrest (MIYA)      Flowsheet Row Pre-Admission Testing from 3/18/2025 in [facility]   Calculated Age Score 1.38 filed at 03/17/2025 1334   Functional Status  0 filed at 03/17/2025 1334   ASA Class  -3.29 filed at 03/17/2025 1334   Creatinine 0 filed at 03/17/2025 1334   Type of Procedure  1.13 filed at 03/17/2025 1334   MIYA Total Score  -6.03 filed at 03/17/2025 1334   MIYA % 0.24 filed at 03/17/2025 1334            Assessment and Plan:     Assessment and Plan:     Preop:   OR with Dr. Reese on 4/4 for nephrolithotomy-minipcnl   Labs ordered per Dr. Reese.   EKG obtained and enclosed. Sinus bradycardia. VR: 58 BPM. Comparable EKG on file      Neurologic:   The patient is at an increased risk for post operative delirium secondary to age >/=65 , polypharmacy , and type and duration of surgery . Preoperative brain exercise educational handout provided to patient.  The patient is at an increased risk for perioperative stroke secondary to cardiac disease, increased age, HTN, HLD, female sex , and general anesthesia.    Cardiac:  CAD: Previous anterior MI with V-fib arrest in 2018-primary revascularization of the LAD diagonal branch, and staged interventions of the first and second marginal branches and RCA. X5 stents placed. Has not taken her baby aspirin in some time-she forgets.    Hyperlipidemia: On atorvastatin   Hypertension: Controlled with medical management   [facility] Activity Status Index (DASI)  DASI Score: 42.7   MET Score: 8  RCRI  1 which is 6% 30 day risk of MACE (risk for cardiac death, nonfatal myocardial infarction, and nonfactal cardiac arrest)  MIYA score which indicates a   0.24% risk of intraoperative or 30-day postoperative MACE    Pulmonary:   STOP-BANG score of  2 . Low  risk of obstructive sleep apnea.   ARISCAT:  3  points which is a low (1.6%) risk of in-hospital post-op pulmonary complications     GI:  Apfel: 3  points 61% risk for post operative N/V    /Renal:   Nephrolithiasis: Reason for upcoming surgery     Neuro-muscular:   Osteoporosis: On alendronate   Osteoarthritis: No acute concerns     Hematologic:   Caprini score 6, patient at high risk for perioperative DVT. Patient provided with VTE education/handout.     Skin check: Patient was instructed to make surgeon aware of any skin changes/concerns prior to surgery.     Anesthesia: No history of anesthesia complications. No anesthesia concerns.      *See risk scores as previously documented

## 2025-03-18 NOTE — PREPROCEDURE INSTRUCTIONS
Thank you for visiting Preadmission Testing at [facility]. If you have any changes to your health condition, please call the SURGEON's office to alert them and give them details of your symptoms.  STOP all NSAIDS (Ibuprofen, Motrin, Aleve), vitamins, herbals, supplements, and all over the counter medications for 7 days prior to surgery  Tylenol is okay to take up until the morning of surgery for pain or headache if needed         Preoperative Brain Exercises    What are brain exercises?  A brain exercise is any activity that engages your thinking (cognitive) skills.    What types of activities are considered brain exercises?  Jigsaw puzzles, crossword puzzles, word jumble, memory games, word search, and many more.  Many can be found free online or on your phone via a mobile saadia.    Why should I do brain exercises before my surgery?  More recent research has shown brain exercise before surgery can lower the risk of postoperative delirium (confusion) which can be especially important for older adults.  Patients who did brain exercises for 5 to 10 hours the days before surgery, cut their risk of postoperative delirium in half up to 1 week after surgery.      Preoperative Deep Breathing Exercises    Why it is important to do deep breathing exercises before my surgery?  Deep breathing exercises strengthen your breathing muscles.  This helps you to recover after your surgery and decreases the chance of breathing complications.    How are the deep breathing exercises done?  Sit straight with your back supported.  Breathe in deeply and slowly through your nose. Your lower rib cage should expand and your abdomen may move forward.  Hold that breath for 3 to 5 seconds.  Breathe out through pursed lips, slowly and completely.  Rest and repeat 10 times every hour while awake.  Rest longer if you become dizzy or lightheaded.      Patient and Family Education   Ways You Can Help Prevent Blood Clots     This handout explains some simple  things you can do to help prevent blood clots.      Blood clots are blockages that can form in the body's veins. When a blood clot forms in your deep veins, it may be called a deep vein thrombosis, or DVT for short. Blood clots can happen in any part of the body where blood flows, but they are most common in the arms and legs. If a piece of a blood clot breaks free and travels to the lungs, it is called a pulmonary embolus (PE). A PE can be a very serious problem.      Being in the hospital or having surgery can raise your chances of getting a blood clot because you may not be well enough to move around as much as you normally do.      Ways you can help prevent blood clots in the hospital         Wearing SCDs. SCDs stands for Sequential Compression Devices.   SCDs are special sleeves that wrap around your legs  They attach to a pump that fills them with air to gently squeeze your legs every few minutes.   This helps return the blood in your legs to your heart.   SCDs should only be taken off when walking or bathing.   SCDs may not be comfortable, but they can help save your life.               Wearing compression stockings - if your doctor orders them. These special snug fitting stockings gently squeeze your legs to help blood flow.       Walking. Walking helps move the blood in your legs.   If your doctor says it is ok, try walking the halls at least   5 times a day. Ask us to help you get up, so you don't fall.      Taking any blood thinning medicines your doctor orders.          ©[facility]; 3/23        Ways you can help prevent blood clots at home       Wearing compression stockings - if your doctor orders them. ? Walking - to help move the blood in your legs.       Taking any blood thinning medicines your doctor orders.      Signs of a blood clot or PE      Tell your doctor or nurse know right away if you have of the problems listed below.    If you are at home, seek medical care right away. Call [phone]  for chest pain or problems breathing.          Signs of a blood clot (DVT) - such as pain,  swelling, redness or warmth in your arm or leg      Signs of a pulmonary embolism (PE) - such as chest     pain or feeling short of breath

## 2025-03-18 NOTE — PREPROCEDURE INSTRUCTIONS
Medication List            Accurate as of March 18, 2025 11:49 AM. Always use your most recent med list.                alendronate 70 mg tablet  Commonly known as: Fosamax  Take 1 tablet (70 mg) by mouth every 7 days. Take in the morning with a full glass of water, on an empty stomach, and do not take anything else by mouth or lie down for the next 30 min.  Medication Adjustments for Surgery: Do Not take on the morning of surgery     ALPRAZolam 0.5 mg tablet  Commonly known as: Xanax  Take 1 tablet (0.5 mg) by mouth 2 times a day as needed for sleep.  Medication Adjustments for Surgery: Take/Use as prescribed     aspirin 81 mg EC tablet  Additional Medication Adjustments for Surgery: Other (Comment)  Notes to patient: Coordinate with prescribing provider for further instructions on this medications prior to surgery.     atorvastatin 80 mg tablet  Commonly known as: Lipitor  Take 1 tablet (80 mg) by mouth once daily.  Medication Adjustments for Surgery: Take/Use as prescribed     lisinopril 5 mg tablet  Take 1 tablet (5 mg) by mouth once daily.  Additional Medication Adjustments for Surgery: Other (Comment)  Notes to patient: HOLD any evening dose the night before the day of surgery  HOLD the day of surgery     metoprolol succinate XL 25 mg 24 hr tablet  Commonly known as: Toprol-XL  Take 1 tablet (25 mg) by mouth once daily.  Medication Adjustments for Surgery: Take/Use as prescribed     nitroglycerin 0.4 mg SL tablet  Commonly known as: Nitrostat  Place 1 tablet (0.4 mg) under the tongue every 5 minutes if needed for chest pain. FOR CHEST PAIN  Medication Adjustments for Surgery: Take/Use as prescribed     potassium citrate CR 15 mEq ER tablet  Commonly known as: Urocit-K-15  Take 2 tablets (30 mEq) by mouth 2 times daily (morning and late afternoon).  Medication Adjustments for Surgery: Take/Use as prescribed     sertraline 100 mg tablet  Commonly known as: Zoloft  Take 1 tablet (100 mg) by mouth once  daily.  Medication Adjustments for Surgery: Take/Use as prescribed     traZODone 50 mg tablet  Commonly known as: Desyrel  Take 1 tablet (50 mg) by mouth as needed at bedtime for sleep.  Medication Adjustments for Surgery: Take/Use as prescribed     Wegovy 1 mg/0.5 mL pen injector  Generic drug: semaglutide (weight loss)  Inject 1 mg under the skin 1 (one) time per week for 8 doses.  Additional Medication Adjustments for Surgery: Take last dose 7 days before surgery                          PRE-OPERATIVE INSTRUCTIONS    You will receive notification one business day prior to your procedure to confirm your arrival time. It is important that you answer your phone and/or check your messages during this time. If you do not hear from the surgery center by 5 pm. the day before your procedure, please call [phone].     Please enter the building through the Outpatient entrance and take the elevator off the lobby to the 2nd floor then check in at the Outpatient Surgery desk on the 2nd floor.    INSTRUCTIONS:  Talk to your surgeon for instructions if you should stop your aspirin, blood thinner, or diabetes medicines.  DO NOT take any multivitamins or over the counter supplements for 7-10 days before surgery.  If not being admitted, you must have an adult immediately available to drive you home after surgery. We also highly recommend you have someone stay with you for the entire day and night of your surgery.  For children having surgery, a parent or legal guardian must accompany them to the surgery center. If this is not possible, please call [phone] to make additional arrangements.  For adults who are unable to consent or make medical decisions for themselves, a legal guardian or Power of  must accompany them to the surgery center. If this is not possible, please call [phone] to make additional arrangements.  Wear comfortable, loose fitting clothing.  All jewelry and piercings must be removed. If you  are unable to remove an item or have a dermal piercing, please be sure to tell the nurse when you arrive for surgery.  Nail polish and make-up must be removed.  Avoid smoking or consuming alcohol for 24 hours before surgery.  To help prevent infection, please take a shower/bath and wash your hair the night before and/or morning of surgery (or follow other specific bathing instructions provided).    Preoperative Fasting Guidelines    Why must I stop eating and drinking near surgery time?  With sedation, food or liquid in your stomach can enter your lungs causing serious complications  Increases nausea and vomiting    When do I need to stop eating and drinking before my surgery?  Do not eat any solid food after midnight the night before your surgery/procedure unless otherwise instructed by your surgeon.   You may have up to 13.5 ounces of clear liquid until TWO hours before your instructed arrival time to the hospital.  This includes water, black tea/coffee, (no milk or cream) apple juice, and electrolyte drinks (Gatorade).   You may chew gum until TWO hours before your surgery/procedure      If applicable, notify your surgeons office immediately of any new skin changes that occur to the surgical limb.      If you have any questions or concerns, please call Pre-Admission Testing at [phone].                   Home Preoperative Antibacterial Shower with Chlorhexidine gluconate (CHG)     What is a home preoperative antibacterial shower?  This shower is a way of cleaning the skin with a germ killing solution before surgery. The solution contains chlorhexidine gluconate, commonly known as CHG. CHG is a skin cleanser with germ killing ability. Let your doctor know if you are allergic to chlorhexidine.    Why do I need to take a preoperative antibacterial shower?  Skin is not sterile. It is best to try to make your skin as free of germs as possible before surgery. Proper cleansing with a germ killing soap before  surgery can lower the number of germs on your skin. This helps to reduce the risk of infection at the surgical site. Following the instructions listed below will help you prepare your skin for surgery.    How do I use the solution?  Begin using your CHG soap the night before and again the morning of your procedure.   Do not shave the day before or day of surgery.  Remove all jewelry until after surgery. Take off rings and take out all body-piercing jewelry.  Wash your face and hair with normal soap and shampoo before you use the CHG soap.  Apply the CHG solution to a clean wet washcloth. Move away from the water to avoid premature rinsing of the CHG soap as you are applying. Firmly lather your entire body from the neck down. Do not use CHG on your face, eyes, ears, or genitals.   Pay special attention to the area where your incisions will be located.  Do not scrub your skin too hard.  It is important to allow the CHG soap to sit on your skin for 3-5 minutes.  Rinse the solution off your body completely. Do not wash with your normal soap after the CHG soap solution.  Pat yourself dry with a clean, soft towel.  Do not apply powders, lotions or deodorants as these might block how the CHG soap works.   Dress in clean clothing.  Be sure to sleep with clean, freshly laundered sheets.  Be aware that CHG can cause stains on fabric. Rinse your washcloth and other linens that have contact with CHG completely. Use only non-chlorine detergents to launder the items used.

## 2025-03-18 NOTE — H&P (VIEW-ONLY)
CPM/PAT Evaluation       Name: Mikaela Cárdenas (Mikaela Cárdenas)  /Age: 1955/69 y.o.     In-Person       Chief Complaint: Kidney stone     HPI  Pleasant 70 y/o female presents with hematuria, unspecified type, nephrolithiasis scheduled for nephrolithotomy-miniPCNL, left on 25. States she was having UTIs and hematuria and this is how she found out about kidney stone. Denies current urinary symptoms or hematuria. Denies flank pain. Denies recent fever/illness/chills.     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    History of mammogram 07/10/2024    cat 1    History of radius fracture     Hypertension     Nephrolithiasis     Old myocardial infarction     Pap test, as part of routine gynecological examination 2024    wnl, hpv neg--2023 wnl, hpv neg--2022 wnl, hpv neg       Past Surgical History:   Procedure Laterality Date    CERVICAL BIOPSY      CERVICAL CONIZATION   W/ LASER      NOT LASER    COLONOSCOPY  2016    due  (-)    COLPOSCOPY      CORONARY ANGIOPLASTY  2017    CORONARY STENT PLACEMENT      x 4    EYE MUSCLE SURGERY  1960    x 2    FIBULA FRACTURE SURGERY Left 2012    TIBIA FRACTURE SURGERY Left 2012    WRIST SURGERY Right        Patient  has no history on file for sexual activity.    Family History   Problem Relation Name Age of Onset    Heart disease Mother      Other (cabg) Mother      Other (cardiac disorder) Mother      Heart disease Father      Other (cardiac disorder) Father      Heart disease Brother      Other (cabg) Brother         Allergies   Allergen Reactions    Bactrim [Sulfamethoxazole-Trimethoprim] Nausea/vomiting       Prior to Admission medications    Medication Sig Start Date End Date Taking? Authorizing Provider   alendronate (Fosamax) 70 mg tablet Take 1 tablet (70 mg) by mouth every 7 days. Take in the morning with a full glass of water, on an  empty stomach, and do not take anything else by mouth or lie down for the next 30 min. 10/11/24   Jt Odell, DO   ALPRAZolam (Xanax) 0.5 mg tablet Take 1 tablet (0.5 mg) by mouth 2 times a day as needed for sleep. 4/1/23   Daija Spencer MD   aspirin 81 mg EC tablet Take 1 tablet (81 mg) by mouth once daily. 4/14/22   Historical Provider, MD   atorvastatin (Lipitor) 80 mg tablet Take 1 tablet (80 mg) by mouth once daily. 8/16/24   Victor Manuel Lazaro,    lisinopril 5 mg tablet Take 1 tablet (5 mg) by mouth once daily. 8/16/24 8/16/25  Victor Manuel Lazaro, DO   metoprolol succinate XL (Toprol-XL) 25 mg 24 hr tablet Take 1 tablet (25 mg) by mouth once daily. 8/16/24 8/16/25  Victor Manuel Lazaro, DO   nitroglycerin (Nitrostat) 0.4 mg SL tablet Place 1 tablet (0.4 mg) under the tongue every 5 minutes if needed for chest pain. FOR CHEST PAIN 4/1/23   Daija Spencer MD   potassium citrate CR (Urocit-K-15) 15 mEq ER tablet Take 2 tablets (30 mEq) by mouth 2 times daily (morning and late afternoon). 2/17/25 8/16/25  Elizabeth Wasserman, APRN-CNP   semaglutide, weight loss, (Wegovy) 1 mg/0.5 mL pen injector Inject 1 mg under the skin 1 (one) time per week for 8 doses. 3/11/25 4/30/25  Daija Spencer MD   sertraline (Zoloft) 100 mg tablet Take 1 tablet (100 mg) by mouth once daily. 11/11/24   Daija Spencer MD   traZODone (Desyrel) 50 mg tablet Take 1 tablet (50 mg) by mouth as needed at bedtime for sleep. 11/11/24   Daija Spencer MD        Constitutional: Negative for fever, chills, or sweats   ENMT: Negative for nasal discharge, congestion, ear pain, mouth pain, throat pain. Positive for glasses. Positive for upper dentures.   Respiratory: Negative for cough, wheezing, shortness of breath   Cardiac: Negative for chest pain, dyspnea on exertion, palpitations   Gastrointestinal: Negative for nausea, vomiting, diarrhea, constipation, abdominal pain  Genitourinary: Negative for dysuria, flank  pain, frequency, hematuria. See HPI   Musculoskeletal: Negative for decreased ROM, pain, swelling, weakness   Neurological: Negative for dizziness, confusion, headache  Psychiatric: Negative for mood changes   Skin: Negative for itching, rash, ulcer    Hematologic/Lymph: Negative for bruising, easy bleeding  Allergic/Immunologic: Negative itching, sneezing, swelling      Physical Exam  Vitals reviewed.   Constitutional:       Appearance: Normal appearance.   HENT:      Head: Normocephalic.      Mouth/Throat:      Mouth: Mucous membranes are moist.      Pharynx: Oropharynx is clear.   Eyes:      Pupils: Pupils are equal, round, and reactive to light.   Cardiovascular:      Rate and Rhythm: Normal rate and regular rhythm.      Heart sounds: Normal heart sounds.   Pulmonary:      Effort: Pulmonary effort is normal.      Breath sounds: Normal breath sounds.   Abdominal:      General: Bowel sounds are normal.      Palpations: Abdomen is soft.   Musculoskeletal:         General: Normal range of motion.      Cervical back: Normal range of motion.   Skin:     General: Skin is warm and dry.   Neurological:      General: No focal deficit present.      Mental Status: She is alert and oriented to person, place, and time.   Psychiatric:         Mood and Affect: Mood normal.         Behavior: Behavior normal.          PAT AIRWAY:   Airway:     Mallampati::  II    Neck ROM::  Full  normal        Testing/Diagnostic:   Nuclear stress test: IMPRESSION:  Normal exercise Myoview cardiac perfusion stress test.  No evidence of ischemia or myocardial infarction by perfusion imaging.  Normal left ventricular systolic function, ejection fraction 65%.  No exercise provoked significant ischemic ECG changes or chest pain  symptoms.  When compared to a study from 2019, no significant interval changes  are seen.    Patient Specialist/PCP:   PCP: Dr. Spencer   Cardiology: Dr. Lazaro     Visit Vitals  /60   Pulse 61   Temp 36.5 °C (97.7  "°F) (Temporal)   Resp 18   Ht 1.6 m (5' 3\")   Wt 77.6 kg (171 lb 1.2 oz)   LMP  (LMP Unknown)   SpO2 97%   BMI 30.30 kg/m²   OB Status Postmenopausal   Smoking Status Never   BSA 1.86 m²       DASI Risk Score      Flowsheet Row Pre-Admission Testing from 3/18/2025 in [facility]   Can you take care of yourself (eat, dress, bathe, or use toilet)?  2.75 filed at 03/17/2025 1333   Can you walk indoors, such as around your house? 1.75 filed at 03/17/2025 1333   Can you walk a block or two on level ground?  2.75 filed at 03/17/2025 1333   Can you climb a flight of stairs or walk up a hill? 5.5 filed at 03/17/2025 1333   Can you run a short distance? 0 filed at 03/17/2025 1333   Can you do light work around the house like dusting or washing dishes? 2.7 filed at 03/17/2025 1333   Can you do moderate work around the house like vacuuming, sweeping floors or carrying groceries? 3.5 filed at 03/17/2025 1333   Can you do heavy work around the house like scrubbing floors or lifting and moving heavy furniture?  8 filed at 03/17/2025 1333   Can you do yard work like raking leaves, weeding or pushing a mower? 4.5 filed at 03/17/2025 1333   Can you have sexual relations? 5.25 filed at 03/17/2025 1333   Can you participate in moderate recreational activities like golf, bowling, dancing, doubles tennis or throwing a baseball or football? 6 filed at 03/17/2025 1333   Can you participate in strenous sports like swimming, singles tennis, football, basketball, or skiing? 0 filed at 03/17/2025 1333   DASI SCORE 42.7 filed at 03/17/2025 1333   METS Score (Will be calculated only when all the questions are answered) 8 filed at 03/17/2025 1333          Caprini DVT Assessment      Flowsheet Row Pre-Admission Testing from 3/18/2025 in [facility]   DVT Score (IF A SCORE IS NOT CALCULATING, MUST SELECT A BMI TO COMPLETE) 6 filed at 03/17/2025 1333   Medical Factors Acute myocardial infarction filed at 03/17/2025 " 1333   Surgical Factors Major surgery planned, including arthroscopic and laproscopic (1-2 hours) filed at 03/17/2025 1333   BMI (BMI MUST BE CHOSEN) 30 or less filed at 03/17/2025 1333          Modified Frailty Index      [place]sheet Row [place] [facility]   Non-independent functional status (problems with dressing, bathing, personal grooming, or cooking) 0 filed at 03/17/2025 1334   History of diabetes mellitus  0 filed at 03/17/2025 1334   History of COPD 0 filed at 03/17/2025 1334   History of CHF No filed at 03/17/2025 1334   History of MI 0.0909 filed at 03/17/2025 1334   History of Percutaneous Coronary Intervention, Cardiac Surgery, or Angina No filed at 03/17/2025 1334   Hypertension requiring the use of medication  0.0909 filed at 03/17/2025 1334   Peripheral vascular disease 0 filed at 03/17/2025 1334   Impaired sensorium (cognitive impairement or loss, clouding, or delirium) 0 filed at 03/17/2025 1334   TIA or CVA withouy residual deficit 0 filed at 03/17/2025 1334   Cerebrovascular accident with deficit 0 filed at 03/17/2025 1334   Modified Frailty Index Calculator .1818 filed at 03/17/2025 1334          [id number] Stroke Risk Points  Current as of just now        N/A 0 to 9 Points:      Last Change: N/A          The [id number] risk score (Lip GH, et al. 2009. © 2010 American College [employer]) quantifies the risk of stroke for a patient with atrial fibrillation. For patients without atrial fibrillation or under the age of 18 this score appears as N/A. Higher score values generally indicate higher risk of stroke.        This score is not applicable to this patient. Components are not calculated.          Revised Cardiac Risk Index      Flowsheet Row [place] [facility]   High-Risk Surgery (Intraperitoneal, Intrathoracic,Suprainguinal vascular) 0 filed at 03/17/2025 1334   History of ischemic heart  disease (History of MI, History of positive exercuse test, Current chest paint considered due to myocardial ischemia, Use of nitrate therapy, ECG with pathological Q Waves) 1 filed at 03/17/2025 1334   History of congestive heart failure (pulmonary edemia, bilateral rales or S3 gallop, Paroxysmal nocturnal dyspnea, CXR showing pulmonary vascular redistribution) 0 filed at 03/17/2025 1334   History of cerebrovascular disease (Prior TIA or stroke) 0 filed at 03/17/2025 1334   Pre-operative insulin treatment 0 filed at 03/17/2025 1334   Pre-operative creatinine>2 mg/dl 0 filed at 03/17/2025 1334   Revised Cardiac Risk Calculator 1 filed at 03/17/2025 1334          Apfel Simplified Score      Flowsheet Row Pre-Admission Testing from 3/18/2025 in [facility] [place]   Smoking status 1 filed at 03/17/2025 1334   History of motion sickness or PONV  0 filed at 03/17/2025 1334   Use of postoperative opioids 1 filed at 03/17/2025 1334   Gender - Female 1=Yes filed at 03/17/2025 1334   Apfel Simplified Score Calculator 3 filed at 03/17/2025 1334          Risk Analysis Index Results This Encounter         3/17/2025  1334             Do you live in a place other than your own home?: 0    When did you begin living in the place you are currently residing?: Greater than one year ago    Any kidney failure, kidney not working well, or seeing a kidney doctor (nephrologist)? If yes, was this for kidney stones or another problem?: 1 Kidney stones    Any history of chronic (long-term) congestive heart failure (CHF)?: 0 No    Any shortness of breath when resting?: 0 No    In the past five years, have you been diagnosed with or treated for cancer?: No    During the last 3 months has it become difficult for you to remember things or organize your thoughts?: 0 No    Have you lost weight of 10 pounds or more in the past 3 months without trying?: 0 No    Do you have any loss of appetitie?: 0 No    Getting Around (Mobility): 0 Can get  around without help    Eatin Can plan and prepare own meals    Toiletin Can use toilet without any help    Personal Hygiene (Bathing, Hand Washing, Changing Clothes): 0 Can shower or bathe without any help    TONG Cancer History: Patient does not indicate history of cancer    Total Risk Analysis Index Score Without Cancer: 21    Total Risk Analysis Index Score: 21          Stop Bang Score      Flowsheet Row Pre-Admission Testing from 3/18/2025 in [facility]   Do you snore loudly? 0 filed at 2025 1333   Do you often feel tired or fatigued after your sleep? 0 filed at 2025 1333   Has anyone ever observed you stop breathing in your sleep? 0 filed at 2025 1333   Do you have or are you being treated for high blood pressure? 1 filed at 2025 1333   Recent BMI (Calculated) 29.4 filed at 2025 1333   Is BMI greater than 35 kg/m2? 0=No filed at 2025 1333   Age older than 50 years old? 1=Yes filed at 2025 1333   Is your neck circumference greater than 17 inches (Male) or 16 inches (Female)? 0 filed at 2025 1333   Gender - Male 0=No filed at 2025 1333   STOP-BANG Total Score 2 filed at 2025 1333          Prodigy: High Risk  Total Score: 8              Prodigy Age Score           ARISCAT Score for Postoperative Pulmonary Complications      Flowsheet Row Pre-Admission Testing from 3/18/2025 in [facility]   Age Calculated Score 3 filed at 2025 1334   Preoperative SpO2 0 filed at 2025 1334   Respiratory infection in the last month Either upper or lower (i.e., URI, bronchitis, pneumonia), with fever and antibiotic treatment 0 filed at 2025 1334   Preoperative anemia (Hgb less than 10 g/dl) 0 filed at 2025 1334   Surgical incision  0 filed at 2025 1334   Duration of surgery  0 filed at 2025 1334   Emergency Procedure  0 filed at 2025 1334   ARISCAT Total Score  3 filed at 2025 6785           Stiven Perioperative Risk for Myocardial Infarction or Cardiac Arrest (MIYA)      Flowsheet Row Pre-Admission Testing from 3/18/2025 in [facility] [place]   Calculated Age Score 1.38 filed at 03/17/2025 1334   Functional Status  0 filed at 03/17/2025 1334   ASA Class  -3.29 filed at 03/17/2025 1334   Creatinine 0 filed at 03/17/2025 1334   Type of Procedure  1.13 filed at 03/17/2025 1334   MIYA Total Score  -6.03 filed at 03/17/2025 1334   MIYA % 0.24 filed at 03/17/2025 1334            Assessment and Plan:     Assessment and Plan:     Preop:   OR with Dr. Reese on 4/4 for nephrolithotomy-minipcnl   Labs ordered per Dr. Reese.   EKG obtained and enclosed. Sinus bradycardia. VR: 58 BPM. Comparable EKG on file      Neurologic:   The patient is at an increased risk for post operative delirium secondary to age >/=65 , polypharmacy , and type and duration of surgery . Preoperative brain exercise educational handout provided to patient.  The patient is at an increased risk for perioperative stroke secondary to cardiac disease, increased age, HTN, HLD, female sex , and general anesthesia.    Cardiac:  CAD: Previous anterior MI with V-fib arrest in 2018-primary revascularization of the LAD diagonal branch, and staged interventions of the first and second marginal branches and RCA. X5 stents placed. Has not taken her baby aspirin in some time-she forgets.    Hyperlipidemia: On atorvastatin   Hypertension: Controlled with medical management   Duke Activity Status Index (DASI)  DASI Score: 42.7   MET Score: 8  RCRI  1 which is 6% 30 day risk of MACE (risk for cardiac death, nonfatal myocardial infarction, and nonfactal cardiac arrest)  MIYA score which indicates a   0.24% risk of intraoperative or 30-day postoperative MACE    Pulmonary:   STOP-BANG score of  2 . Low  risk of obstructive sleep apnea.   ARISCAT:  3  points which is a low (1.6%) risk of in-hospital post-op pulmonary complications     GI:  Apfel: 3  points 61% risk for post operative N/V    /Renal:   Nephrolithiasis: Reason for upcoming surgery     Neuro-muscular:   Osteoporosis: On alendronate   Osteoarthritis: No acute concerns     Hematologic:   Caprini score 6, patient at high risk for perioperative DVT. Patient provided with VTE education/handout.     Skin check: Patient was instructed to make surgeon aware of any skin changes/concerns prior to surgery.     Anesthesia: No history of anesthesia complications. No anesthesia concerns.      *See risk scores as previously documented

## 2025-03-20 LAB — BACTERIA UR CULT: ABNORMAL

## 2025-03-25 LAB
ATRIAL RATE: 58 BPM
P AXIS: 49 DEGREES
P OFFSET: 177 MS
P ONSET: 121 MS
PR INTERVAL: 196 MS
Q ONSET: 219 MS
QRS COUNT: 9 BEATS
QRS DURATION: 88 MS
QT INTERVAL: 424 MS
QTC CALCULATION(BAZETT): 416 MS
QTC FREDERICIA: 419 MS
R AXIS: -20 DEGREES
T AXIS: 17 DEGREES
T OFFSET: 431 MS
VENTRICULAR RATE: 58 BPM

## 2025-03-26 ENCOUNTER — TELEPHONE (OUTPATIENT)
Dept: UROLOGY | Facility: CLINIC | Age: 70
End: 2025-03-26
Payer: MEDICARE

## 2025-03-26 NOTE — TELEPHONE ENCOUNTER
Patient notified of positive urine results, patient already has a prescription for Cipro to take for 7 days prior to her procedure.

## 2025-03-31 ENCOUNTER — TELEPHONE (OUTPATIENT)
Dept: CARDIOLOGY | Facility: CLINIC | Age: 70
End: 2025-03-31
Payer: MEDICARE

## 2025-03-31 NOTE — TELEPHONE ENCOUNTER
Pt phones that she is scheduled for nephrolithotomy 4/4 with Dr Reese. She is inquiring if it is okay to hold aspirin 1 week prior. Please advise    Pt last seen 8/2024. Will now be following with Dr Cannon as this location is closer for her.

## 2025-04-01 NOTE — TELEPHONE ENCOUNTER
Per Dr. Victor Manuel Lazaro, DO; okay to hold. Phoned patient with update. Verbalized understanding.

## 2025-04-03 ENCOUNTER — ANESTHESIA EVENT (OUTPATIENT)
Dept: OPERATING ROOM | Facility: HOSPITAL | Age: 70
End: 2025-04-03
Payer: MEDICARE

## 2025-04-04 ENCOUNTER — ANESTHESIA (OUTPATIENT)
Dept: OPERATING ROOM | Facility: HOSPITAL | Age: 70
End: 2025-04-04
Payer: MEDICARE

## 2025-04-04 ENCOUNTER — PHARMACY VISIT (OUTPATIENT)
Dept: PHARMACY | Facility: CLINIC | Age: 70
End: 2025-04-04
Payer: COMMERCIAL

## 2025-04-04 ENCOUNTER — HOSPITAL ENCOUNTER (OUTPATIENT)
Facility: HOSPITAL | Age: 70
Setting detail: OUTPATIENT SURGERY
Discharge: HOME | End: 2025-04-04
Attending: UROLOGY | Admitting: UROLOGY
Payer: MEDICARE

## 2025-04-04 ENCOUNTER — APPOINTMENT (OUTPATIENT)
Dept: RADIOLOGY | Facility: HOSPITAL | Age: 70
End: 2025-04-04
Payer: MEDICARE

## 2025-04-04 VITALS
HEIGHT: 63 IN | WEIGHT: 168 LBS | DIASTOLIC BLOOD PRESSURE: 52 MMHG | TEMPERATURE: 97.2 F | OXYGEN SATURATION: 99 % | BODY MASS INDEX: 29.77 KG/M2 | HEART RATE: 62 BPM | SYSTOLIC BLOOD PRESSURE: 110 MMHG | RESPIRATION RATE: 16 BRPM

## 2025-04-04 DIAGNOSIS — R31.9 HEMATURIA, UNSPECIFIED TYPE: ICD-10-CM

## 2025-04-04 DIAGNOSIS — N39.0 FREQUENT UTI: ICD-10-CM

## 2025-04-04 DIAGNOSIS — N20.0 NEPHROLITHIASIS: Primary | ICD-10-CM

## 2025-04-04 PROCEDURE — 3600000008 HC OR TIME - EACH INCREMENTAL 1 MINUTE - PROCEDURE LEVEL THREE: Performed by: UROLOGY

## 2025-04-04 PROCEDURE — C2617 STENT, NON-COR, TEM W/O DEL: HCPCS | Performed by: UROLOGY

## 2025-04-04 PROCEDURE — 2720000007 HC OR 272 NO HCPCS: Performed by: UROLOGY

## 2025-04-04 PROCEDURE — C1894 INTRO/SHEATH, NON-LASER: HCPCS | Performed by: UROLOGY

## 2025-04-04 PROCEDURE — 3700000001 HC GENERAL ANESTHESIA TIME - INITIAL BASE CHARGE: Performed by: UROLOGY

## 2025-04-04 PROCEDURE — 2500000001 HC RX 250 WO HCPCS SELF ADMINISTERED DRUGS (ALT 637 FOR MEDICARE OP): Performed by: ANESTHESIOLOGY

## 2025-04-04 PROCEDURE — 7100000010 HC PHASE TWO TIME - EACH INCREMENTAL 1 MINUTE: Performed by: UROLOGY

## 2025-04-04 PROCEDURE — 7100000009 HC PHASE TWO TIME - INITIAL BASE CHARGE: Performed by: UROLOGY

## 2025-04-04 PROCEDURE — 50437 DILAT XST TRC NEW ACCESS RCS: CPT | Performed by: UROLOGY

## 2025-04-04 PROCEDURE — 2780000003 HC OR 278 NO HCPCS: Performed by: UROLOGY

## 2025-04-04 PROCEDURE — 2500000005 HC RX 250 GENERAL PHARMACY W/O HCPCS: Performed by: ANESTHESIOLOGY

## 2025-04-04 PROCEDURE — 7100000001 HC RECOVERY ROOM TIME - INITIAL BASE CHARGE: Performed by: UROLOGY

## 2025-04-04 PROCEDURE — 52332 CYSTOSCOPY AND TREATMENT: CPT | Performed by: UROLOGY

## 2025-04-04 PROCEDURE — 3700000002 HC GENERAL ANESTHESIA TIME - EACH INCREMENTAL 1 MINUTE: Performed by: UROLOGY

## 2025-04-04 PROCEDURE — 7100000002 HC RECOVERY ROOM TIME - EACH INCREMENTAL 1 MINUTE: Performed by: UROLOGY

## 2025-04-04 PROCEDURE — C1769 GUIDE WIRE: HCPCS | Performed by: UROLOGY

## 2025-04-04 PROCEDURE — 2500000004 HC RX 250 GENERAL PHARMACY W/ HCPCS (ALT 636 FOR OP/ED): Performed by: NURSE ANESTHETIST, CERTIFIED REGISTERED

## 2025-04-04 PROCEDURE — 2500000001 HC RX 250 WO HCPCS SELF ADMINISTERED DRUGS (ALT 637 FOR MEDICARE OP): Performed by: UROLOGY

## 2025-04-04 PROCEDURE — 2500000004 HC RX 250 GENERAL PHARMACY W/ HCPCS (ALT 636 FOR OP/ED): Mod: JZ | Performed by: ANESTHESIOLOGY

## 2025-04-04 PROCEDURE — 3600000003 HC OR TIME - INITIAL BASE CHARGE - PROCEDURE LEVEL THREE: Performed by: UROLOGY

## 2025-04-04 PROCEDURE — 2500000004 HC RX 250 GENERAL PHARMACY W/ HCPCS (ALT 636 FOR OP/ED): Performed by: UROLOGY

## 2025-04-04 PROCEDURE — 2550000001 HC RX 255 CONTRASTS: Performed by: UROLOGY

## 2025-04-04 PROCEDURE — 50081 PERQ NL/PL LITHOTRP CPLX>2CM: CPT | Performed by: UROLOGY

## 2025-04-04 PROCEDURE — 82365 CALCULUS SPECTROSCOPY: CPT | Performed by: UROLOGY

## 2025-04-04 PROCEDURE — RXMED WILLOW AMBULATORY MEDICATION CHARGE

## 2025-04-04 DEVICE — URETERAL STENT
Type: IMPLANTABLE DEVICE | Site: URETER | Status: FUNCTIONAL
Brand: CONTOUR™

## 2025-04-04 RX ORDER — OXYCODONE HYDROCHLORIDE 5 MG/1
5 TABLET ORAL EVERY 6 HOURS PRN
Qty: 7 TABLET | Refills: 0 | Status: SHIPPED | OUTPATIENT
Start: 2025-04-04

## 2025-04-04 RX ORDER — CEFAZOLIN SODIUM 2 G/100ML
2 INJECTION, SOLUTION INTRAVENOUS ONCE
Status: COMPLETED | OUTPATIENT
Start: 2025-04-04 | End: 2025-04-04

## 2025-04-04 RX ORDER — GLYCOPYRROLATE 0.2 MG/ML
INJECTION INTRAMUSCULAR; INTRAVENOUS AS NEEDED
Status: DISCONTINUED | OUTPATIENT
Start: 2025-04-04 | End: 2025-04-04

## 2025-04-04 RX ORDER — HYDRALAZINE HYDROCHLORIDE 20 MG/ML
5 INJECTION INTRAMUSCULAR; INTRAVENOUS EVERY 30 MIN PRN
Status: DISCONTINUED | OUTPATIENT
Start: 2025-04-04 | End: 2025-04-04 | Stop reason: HOSPADM

## 2025-04-04 RX ORDER — OXYBUTYNIN CHLORIDE 10 MG/1
10 TABLET, EXTENDED RELEASE ORAL DAILY
Qty: 10 TABLET | Refills: 0 | Status: SHIPPED | OUTPATIENT
Start: 2025-04-04

## 2025-04-04 RX ORDER — TAMSULOSIN HYDROCHLORIDE 0.4 MG/1
0.4 CAPSULE ORAL DAILY
Qty: 7 CAPSULE | Refills: 0 | Status: SHIPPED | OUTPATIENT
Start: 2025-04-04

## 2025-04-04 RX ORDER — HYDROCODONE BITARTRATE AND ACETAMINOPHEN 5; 325 MG/1; MG/1
1 TABLET ORAL EVERY 4 HOURS PRN
Status: DISCONTINUED | OUTPATIENT
Start: 2025-04-04 | End: 2025-04-04 | Stop reason: HOSPADM

## 2025-04-04 RX ORDER — LIDOCAINE HYDROCHLORIDE 20 MG/ML
INJECTION, SOLUTION INFILTRATION; PERINEURAL AS NEEDED
Status: DISCONTINUED | OUTPATIENT
Start: 2025-04-04 | End: 2025-04-04

## 2025-04-04 RX ORDER — DIPHENHYDRAMINE HYDROCHLORIDE 50 MG/ML
12.5 INJECTION, SOLUTION INTRAMUSCULAR; INTRAVENOUS ONCE AS NEEDED
Status: DISCONTINUED | OUTPATIENT
Start: 2025-04-04 | End: 2025-04-04 | Stop reason: HOSPADM

## 2025-04-04 RX ORDER — HYDROMORPHONE HYDROCHLORIDE 1 MG/ML
1 INJECTION, SOLUTION INTRAMUSCULAR; INTRAVENOUS; SUBCUTANEOUS EVERY 5 MIN PRN
Status: DISCONTINUED | OUTPATIENT
Start: 2025-04-04 | End: 2025-04-04 | Stop reason: HOSPADM

## 2025-04-04 RX ORDER — METOCLOPRAMIDE HYDROCHLORIDE 5 MG/ML
10 INJECTION INTRAMUSCULAR; INTRAVENOUS ONCE AS NEEDED
Status: DISCONTINUED | OUTPATIENT
Start: 2025-04-04 | End: 2025-04-04 | Stop reason: HOSPADM

## 2025-04-04 RX ORDER — ROCURONIUM BROMIDE 10 MG/ML
INJECTION, SOLUTION INTRAVENOUS AS NEEDED
Status: DISCONTINUED | OUTPATIENT
Start: 2025-04-04 | End: 2025-04-04

## 2025-04-04 RX ORDER — ALBUTEROL SULFATE 0.83 MG/ML
2.5 SOLUTION RESPIRATORY (INHALATION)
Status: DISCONTINUED | OUTPATIENT
Start: 2025-04-04 | End: 2025-04-04 | Stop reason: HOSPADM

## 2025-04-04 RX ORDER — MIDAZOLAM HYDROCHLORIDE 1 MG/ML
1 INJECTION, SOLUTION INTRAMUSCULAR; INTRAVENOUS ONCE AS NEEDED
Status: DISCONTINUED | OUTPATIENT
Start: 2025-04-04 | End: 2025-04-04 | Stop reason: HOSPADM

## 2025-04-04 RX ORDER — FAMOTIDINE 10 MG/ML
INJECTION, SOLUTION INTRAVENOUS AS NEEDED
Status: DISCONTINUED | OUTPATIENT
Start: 2025-04-04 | End: 2025-04-04

## 2025-04-04 RX ORDER — MIDAZOLAM HYDROCHLORIDE 1 MG/ML
INJECTION, SOLUTION INTRAMUSCULAR; INTRAVENOUS AS NEEDED
Status: DISCONTINUED | OUTPATIENT
Start: 2025-04-04 | End: 2025-04-04

## 2025-04-04 RX ORDER — PROPOFOL 10 MG/ML
INJECTION, EMULSION INTRAVENOUS AS NEEDED
Status: DISCONTINUED | OUTPATIENT
Start: 2025-04-04 | End: 2025-04-04

## 2025-04-04 RX ORDER — PHENAZOPYRIDINE HYDROCHLORIDE 100 MG/1
200 TABLET, FILM COATED ORAL ONCE
Status: DISCONTINUED | OUTPATIENT
Start: 2025-04-04 | End: 2025-04-04 | Stop reason: HOSPADM

## 2025-04-04 RX ORDER — DEXAMETHASONE SODIUM PHOSPHATE 10 MG/ML
INJECTION INTRAMUSCULAR; INTRAVENOUS AS NEEDED
Status: DISCONTINUED | OUTPATIENT
Start: 2025-04-04 | End: 2025-04-04

## 2025-04-04 RX ORDER — FENTANYL CITRATE 50 UG/ML
INJECTION, SOLUTION INTRAMUSCULAR; INTRAVENOUS AS NEEDED
Status: DISCONTINUED | OUTPATIENT
Start: 2025-04-04 | End: 2025-04-04

## 2025-04-04 RX ORDER — PHENYLEPHRINE HCL IN 0.9% NACL 1 MG/10 ML
SYRINGE (ML) INTRAVENOUS AS NEEDED
Status: DISCONTINUED | OUTPATIENT
Start: 2025-04-04 | End: 2025-04-04

## 2025-04-04 RX ORDER — NEOSTIGMINE METHYLSULFATE 1 MG/ML
INJECTION INTRAVENOUS AS NEEDED
Status: DISCONTINUED | OUTPATIENT
Start: 2025-04-04 | End: 2025-04-04

## 2025-04-04 RX ORDER — SODIUM CHLORIDE, SODIUM LACTATE, POTASSIUM CHLORIDE, CALCIUM CHLORIDE 600; 310; 30; 20 MG/100ML; MG/100ML; MG/100ML; MG/100ML
100 INJECTION, SOLUTION INTRAVENOUS CONTINUOUS
Status: DISCONTINUED | OUTPATIENT
Start: 2025-04-04 | End: 2025-04-04 | Stop reason: HOSPADM

## 2025-04-04 RX ORDER — PHENAZOPYRIDINE HYDROCHLORIDE 200 MG/1
200 TABLET, FILM COATED ORAL 3 TIMES DAILY PRN
Qty: 20 TABLET | Refills: 0 | Status: SHIPPED | OUTPATIENT
Start: 2025-04-04

## 2025-04-04 RX ORDER — LABETALOL HYDROCHLORIDE 5 MG/ML
5 INJECTION, SOLUTION INTRAVENOUS
Status: DISCONTINUED | OUTPATIENT
Start: 2025-04-04 | End: 2025-04-04 | Stop reason: HOSPADM

## 2025-04-04 RX ORDER — CIPROFLOXACIN 500 MG/1
500 TABLET ORAL 2 TIMES DAILY
Qty: 6 TABLET | Refills: 0 | Status: SHIPPED | OUTPATIENT
Start: 2025-04-04 | End: 2025-04-07

## 2025-04-04 RX ORDER — ACETAMINOPHEN 325 MG/1
975 TABLET ORAL ONCE
Status: COMPLETED | OUTPATIENT
Start: 2025-04-04 | End: 2025-04-04

## 2025-04-04 RX ORDER — ONDANSETRON HYDROCHLORIDE 2 MG/ML
INJECTION, SOLUTION INTRAVENOUS AS NEEDED
Status: DISCONTINUED | OUTPATIENT
Start: 2025-04-04 | End: 2025-04-04

## 2025-04-04 RX ADMIN — GLYCOPYRROLATE 0.2 MG: 0.2 INJECTION, SOLUTION INTRAMUSCULAR; INTRAVENOUS at 09:27

## 2025-04-04 RX ADMIN — DEXAMETHASONE SODIUM PHOSPHATE 4 MG: 10 INJECTION INTRAMUSCULAR; INTRAVENOUS at 07:52

## 2025-04-04 RX ADMIN — LIDOCAINE HYDROCHLORIDE 60 MG: 20 INJECTION, SOLUTION INFILTRATION; PERINEURAL at 07:47

## 2025-04-04 RX ADMIN — PROPOFOL 150 MG: 10 INJECTION, EMULSION INTRAVENOUS at 07:45

## 2025-04-04 RX ADMIN — Medication 100 MCG: at 08:27

## 2025-04-04 RX ADMIN — FENTANYL CITRATE 50 MCG: 50 INJECTION, SOLUTION INTRAMUSCULAR; INTRAVENOUS at 08:33

## 2025-04-04 RX ADMIN — SODIUM CHLORIDE, POTASSIUM CHLORIDE, SODIUM LACTATE AND CALCIUM CHLORIDE: 600; 310; 30; 20 INJECTION, SOLUTION INTRAVENOUS at 07:36

## 2025-04-04 RX ADMIN — Medication 100 MCG: at 07:59

## 2025-04-04 RX ADMIN — GLYCOPYRROLATE 0.4 MG: 0.2 INJECTION, SOLUTION INTRAMUSCULAR; INTRAVENOUS at 09:36

## 2025-04-04 RX ADMIN — MIDAZOLAM 2 MG: 1 INJECTION INTRAMUSCULAR; INTRAVENOUS at 07:36

## 2025-04-04 RX ADMIN — ACETAMINOPHEN 975 MG: 325 TABLET ORAL at 06:27

## 2025-04-04 RX ADMIN — FENTANYL CITRATE 50 MCG: 50 INJECTION, SOLUTION INTRAMUSCULAR; INTRAVENOUS at 07:47

## 2025-04-04 RX ADMIN — NEOSTIGMINE METHYLSULFATE 2 MG: 1 INJECTION INTRAVENOUS at 09:36

## 2025-04-04 RX ADMIN — FAMOTIDINE 20 MG: 10 INJECTION, SOLUTION INTRAVENOUS at 09:20

## 2025-04-04 RX ADMIN — Medication 5 L/MIN: at 09:55

## 2025-04-04 RX ADMIN — ONDANSETRON 4 MG: 2 INJECTION, SOLUTION INTRAMUSCULAR; INTRAVENOUS at 09:44

## 2025-04-04 RX ADMIN — HYDROMORPHONE HYDROCHLORIDE 0.5 MG: 1 INJECTION, SOLUTION INTRAMUSCULAR; INTRAVENOUS; SUBCUTANEOUS at 10:18

## 2025-04-04 RX ADMIN — Medication 50 MCG: at 08:59

## 2025-04-04 RX ADMIN — ROCURONIUM BROMIDE 50 MG: 10 INJECTION INTRAVENOUS at 07:47

## 2025-04-04 RX ADMIN — HYDROMORPHONE HYDROCHLORIDE 0.5 MG: 1 INJECTION, SOLUTION INTRAMUSCULAR; INTRAVENOUS; SUBCUTANEOUS at 10:08

## 2025-04-04 RX ADMIN — HYDROCODONE BITARTRATE AND ACETAMINOPHEN 1 TABLET: 5; 325 TABLET ORAL at 11:11

## 2025-04-04 RX ADMIN — CEFAZOLIN SODIUM 2 G: 2 INJECTION, SOLUTION INTRAVENOUS at 07:54

## 2025-04-04 ASSESSMENT — PAIN SCALES - GENERAL
PAINLEVEL_OUTOF10: 5 - MODERATE PAIN
PAINLEVEL_OUTOF10: 6
PAINLEVEL_OUTOF10: 0 - NO PAIN
PAINLEVEL_OUTOF10: 5 - MODERATE PAIN
PAINLEVEL_OUTOF10: 2

## 2025-04-04 ASSESSMENT — COLUMBIA-SUICIDE SEVERITY RATING SCALE - C-SSRS
6. HAVE YOU EVER DONE ANYTHING, STARTED TO DO ANYTHING, OR PREPARED TO DO ANYTHING TO END YOUR LIFE?: NO
2. HAVE YOU ACTUALLY HAD ANY THOUGHTS OF KILLING YOURSELF?: NO
1. IN THE PAST MONTH, HAVE YOU WISHED YOU WERE DEAD OR WISHED YOU COULD GO TO SLEEP AND NOT WAKE UP?: NO

## 2025-04-04 ASSESSMENT — PAIN DESCRIPTION - ORIENTATION: ORIENTATION: LEFT

## 2025-04-04 ASSESSMENT — PAIN - FUNCTIONAL ASSESSMENT
PAIN_FUNCTIONAL_ASSESSMENT: 0-10

## 2025-04-04 ASSESSMENT — PAIN DESCRIPTION - LOCATION
LOCATION: BACK
LOCATION: BACK

## 2025-04-04 NOTE — ANESTHESIA POSTPROCEDURE EVALUATION
Patient: Mikaela Cárdenas    Procedure Summary       Date: 04/04/25 Room / Location: ST OR 08 / [facility] OR    Anesthesia Start: 0736 Anesthesia Stop: 0954    Procedure: Nephrolithotomy - miniPCNL W/ HOLMIUM LASER (Left) Diagnosis:       Hematuria, unspecified type      Nephrolithiasis      (Hematuria, unspecified type [R31.9])      (Nephrolithiasis [N20.0])    Surgeons: Stewart Reese MD Responsible Provider: Carlos A Davis MD    Anesthesia Type: general ASA Status: 3            Anesthesia Type: general    Vitals Value Taken Time   /58 04/04/25 1045   Temp 36 °C (96.8 °F) 04/04/25 1000   Pulse 57 04/04/25 1045   Resp 19 04/04/25 1045   SpO2 100 % 04/04/25 1045   Vitals shown include unfiled device data.    Anesthesia Post Evaluation    Patient location during evaluation: PACU  Patient participation: complete - patient participated  Level of consciousness: awake and alert  Pain management: satisfactory to patient  Airway patency: patent  Cardiovascular status: hemodynamically stable  Respiratory status: spontaneous ventilation  Hydration status: euvolemic  Postoperative Nausea and Vomiting: none        There were no known notable events for this encounter.

## 2025-04-04 NOTE — DISCHARGE INSTRUCTIONS
DEPARTMENT OF Urology  DISCHARGE INSTRUCTIONS PCNL  Outpatient Surgery    C O N F I D E N T I A L   I N F O R M A T I O N    Mikaela Cárdenas    Call [phone] during regular daytime business hours (8:00 am - 5:00 pm) and after 5:00 pm and ask for the Urology resident with any questions or concerns.    If it is a life-threatening situation, proceed to the nearest emergency department.        Follow-up appointment:  4/10/2025      Thank you for the opportunity to care for you today.  Your health and healing are very important to us.  We hope we made you feel as comfortable as possible and are committed to your recovery and continued well-being.      The following is a brief overview of your PCNL(percutaneous nephrolithotomy) procedure today. Some of the information contained on this summary may be confidential.  This information should be kept in your records and should be shared with your regular doctor.    Physicians:   Dr. Reese    Procedure performed: Lasering of your kidney stones through access gained in your back    What to Expect During your Recovery and Home Care  Anesthesia Side Effects   You received anesthesia today.  You may feel sleepy, tired, or have a sore throat.   You may also feel drowsiness, dizziness, or inability to think clearly.  For your safety, do not drive, drink alcoholic beverages, take any unprescribed medication or make any important decisions for 24 hours.  A responsible adult should be with you for 24 hours.        Activity and Recovery    No heavy lifting. Rest for the next 24 hours.     Pain Control  Unfortunately, you may experience pain after your procedure.  Frequency and urgency to urinate and mild discomfort are expected. Adequate pain management can include alternative measures to help ease your pain and that can include over the counter Tylenol/ibuprofen and a heating pad. Do not take more than 4,000mg of Tylenol in a 24-hour period.  You can take oxycodone as needed for  severe pain. This is a narcotic medication-- you may not drive or operate heavy machinery while taking this medication.     You may have also been prescribed Pyridium (for burning sensation) and Ditropan (for bladder spasms) for pain control. Pyridium will turn your urine bright orange. You have been prescribed Flomax (to relax the ureter and help any remaining dust pass through).    Nausea/Vomiting   Clear liquids are best tolerated at first. Start slow, advance your diet as tolerated to normal foods. Avoid spicy, greasy, heavy foods at first. Also, you may feel nauseous or like you need to vomit if you take any type of medication on an empty stomach.  Call your physician if you are unable to eat or drink and have persistent vomiting.    Signs of Bleeding   You could have some blood in your urine off and on over the next several weeks. Your urine will be light pink to yellow.    Treatment/wound care:   It is okay to shower 24 hours after time of surgery. If you go home with a tube(s) in your back do not submerge them in water. (no tub bathing, or swimming)    Signs of Infection  Signs of infection can include fever, chills, burning sensation with passing of urine, or severe abdominal pain.  If you see any of these occur, please contact your doctor's office at [phone].  Any fever higher than 100.4, especially if associated with an ill feeling, abdominal pain, chills, or nausea should be reported to your surgeon.      Assist in bowel movements/urination  Increase fiber in diet  Increase water (6 to 8 glasses)  Increase walking   Urination should occur within 6 hours of anesthesia  If you have tried these methods and your bladder still feels full and you cannot use the bathroom, please go to your nearest Emergency room/contact your physician.    Additional Instructions:   Pieces of your kidney stone may pass in the urine for a few days and may cause some mild pain. You also had a stent placed today from your  kidney down into your bladder. This helps keep the urinary tract open and prevents blockages of urine flow. The stent can be irritating and can cause some frequency, urgency and blood in your urine when you go to the bathroom. It is important to drink plenty of water and take medications as prescribed. You may be sent home with Pyridium which turns your urine bright orange. Applying a heating pad to your back will also help with this kind of pain. It will be determined at your follow up appointment when the stent will be removed.     Monitor for infection at the surgery site on your back. Signs of infection include green/yellow drainage, swelling, warmth, redness, fever and chills. You can begin showering as normal tomorrow 4/5 and let soap and water run over the incision. Do not scrub at this incision. It is covered with skin glue which will flake off on its own in the next week or so.

## 2025-04-04 NOTE — ANESTHESIA PROCEDURE NOTES
Airway  Date/Time: 4/4/2025 7:48 AM  Urgency: elective    Airway not difficult    Staffing  Performed: MARYLIN   Authorized by: Carlos A Davis MD    Performed by: MARYLIN Queazda  Patient location during procedure: OR    Indications and Patient Condition  Indications for airway management: anesthesia  Spontaneous Ventilation: absent  Sedation level: deep  Preoxygenated: yes  Patient position: sniffing  MILS maintained throughout  Mask difficulty assessment: 1 - vent by mask  Planned trial extubation    Final Airway Details  Final airway type: endotracheal airway      Successful airway: ETT  Cuffed: yes   Successful intubation technique: direct laryngoscopy  Facilitating devices/methods: intubating stylet  Endotracheal tube insertion site: oral  Blade: Brandi  Blade size: #3  ETT size (mm): 7.0  Cormack-Lehane Classification: grade I - full view of glottis  Placement verified by: chest auscultation   Measured from: lips  ETT to lips (cm): 22  Number of attempts at approach: 1  Ventilation between attempts: none

## 2025-04-04 NOTE — ANESTHESIA PREPROCEDURE EVALUATION
Patient: Mikaela Cárdenas    Procedure Information       Anesthesia Start Date/Time: 04/04/25 0740    Procedure: Nephrolithotomy - miniPCNL W/ HOLMIUM LASER (Left) - Needs miniPCNL scope set    Location: [facility] / [facility]    Surgeons: Stewart Reese MD            Relevant Problems   Cardiac   (+) 3-vessel CAD   (+) ASHD (arteriosclerotic heart disease)   (+) Hyperlipidemia      Neuro   (+) Current moderate episode of major depressive disorder, unspecified whether recurrent (Multi)      /Renal   (+) Frequent UTI   (+) Nephrolithiasis   (+) Uric acid nephrolithiasis      ID   (+) Frequent UTI       Clinical information reviewed:   Tobacco  Allergies  Meds   Med Hx  Surg Hx   Fam Hx  Soc Hx        NPO Detail:  NPO/Void Status  Date of Last Liquid: 04/04/25  Time of Last Liquid: 0632  Date of Last Solid: 04/03/25  Time of Last Solid: 1130  Last Intake Type: Clear fluids  Time of Last Void: 0530         Physical Exam    Airway  Mallampati: II  TM distance: >3 FB  Neck ROM: full     Cardiovascular   Rhythm: regular  Rate: normal     Dental   (+) upper dentures     Pulmonary   Breath sounds clear to auscultation     Abdominal - normal exam           Vitals:    04/04/25 0629   BP: 137/67   Pulse: 64   Resp: 17   Temp: 36 °C (96.8 °F)   SpO2: 100%       Past Surgical History:   Procedure Laterality Date    CERVICAL BIOPSY  2021    CERVICAL CONIZATION   W/ LASER  2021    NOT LASER    COLONOSCOPY  2016    due 2026 (-)    COLPOSCOPY  2021    CORONARY ANGIOPLASTY  2017    CORONARY STENT PLACEMENT      x 4    EYE MUSCLE SURGERY  1960    x 2    FIBULA FRACTURE SURGERY Left 2012    TIBIA FRACTURE SURGERY Left 2012    WRIST SURGERY Right      Past Medical History:   Diagnosis Date    Anxiety     Distal radius fracture     Heart disease     History of cervical dysplasia 2021    [facility] for HGSIL/CIN3    History of mammogram 07/10/2024    cat 1    History of radius fracture     Hypertension     Nephrolithiasis     Old  myocardial infarction     Pap test, as part of routine gynecological examination 06/2024    wnl, hpv neg--5/2023 wnl, hpv neg--11/2022 wnl, hpv neg       Current Facility-Administered Medications:     ceFAZolin (Ancef) 2 g in dextrose (iso)  mL, 2 g, intravenous, Once, Stewart Reese MD    Facility-Administered Medications Ordered in Other Encounters:     propofol (Diprivan) injection, , intravenous, PRN, Nia Pino, CAA, 150 mg at 04/04/25 0745  Prior to Admission medications    Medication Sig Start Date End Date Taking? Authorizing Provider   atorvastatin (Lipitor) 80 mg tablet Take 1 tablet (80 mg) by mouth once daily. 8/16/24  Yes Victor Manuel Lazaro, DO   lisinopril 5 mg tablet Take 1 tablet (5 mg) by mouth once daily. 8/16/24 8/16/25 Yes Victor Manuel Lazaro, DO   metoprolol succinate XL (Toprol-XL) 25 mg 24 hr tablet Take 1 tablet (25 mg) by mouth once daily. 8/16/24 8/16/25 Yes Vicotr Manuel Lazaro, DO   potassium citrate CR (Urocit-K-15) 15 mEq ER tablet Take 2 tablets (30 mEq) by mouth 2 times daily (morning and late afternoon). 2/17/25 8/16/25 Yes Elizabeth Wasserman, APRN-CNP   sertraline (Zoloft) 100 mg tablet Take 1 tablet (100 mg) by mouth once daily. 11/11/24  Yes Daija Spencer MD   traZODone (Desyrel) 50 mg tablet Take 1 tablet (50 mg) by mouth as needed at bedtime for sleep. 11/11/24  Yes Daija Spencer MD   alendronate (Fosamax) 70 mg tablet Take 1 tablet (70 mg) by mouth every 7 days. Take in the morning with a full glass of water, on an empty stomach, and do not take anything else by mouth or lie down for the next 30 min. 10/11/24   Jt Odell, DO   ALPRAZolam (Xanax) 0.5 mg tablet Take 1 tablet (0.5 mg) by mouth 2 times a day as needed for sleep. 4/1/23   Daija Spencer MD   aspirin 81 mg EC tablet Take 1 tablet (81 mg) by mouth once daily. 4/14/22   Historical Provider, MD   nitroglycerin (Nitrostat) 0.4 mg SL tablet Place 1 tablet (0.4 mg) under the tongue  every 5 minutes if needed for chest pain. FOR CHEST PAIN 4/1/23   Daija Spencer MD   semaglutide, weight loss, (Wegovy) 1 mg/0.5 mL pen injector Inject 1 mg under the skin 1 (one) time per week for 8 doses.  Patient taking differently: Inject 1 mg under the skin 1 (one) time per week. Every monday 3/11/25 4/30/25  Daija Spencer MD     No Known Allergies  Social History     Tobacco Use    Smoking status: Never     Passive exposure: Never    Smokeless tobacco: Never   Substance Use Topics    Alcohol use: Yes     Comment: rare         Chemistry    Lab Results   Component Value Date/Time     03/18/2025 1220     01/27/2025 1242    K 4.1 03/18/2025 1220    K 4.5 01/27/2025 1242     03/18/2025 1220     01/27/2025 1242    CO2 26 03/18/2025 1220    CO2 27 01/27/2025 1242    BUN 22 03/18/2025 1220    BUN 24 01/27/2025 1242    CREATININE 0.83 03/18/2025 1220    CREATININE 0.79 01/27/2025 1242    Lab Results   Component Value Date/Time    CALCIUM 9.1 03/18/2025 1220    CALCIUM 9.4 01/27/2025 1242    ALKPHOS 91 11/11/2024 1209    AST 15 11/11/2024 1209    ALT 21 11/11/2024 1209    BILITOT 0.4 11/11/2024 1209          Lab Results   Component Value Date/Time    WBC 6.5 03/18/2025 1220    HGB 12.2 03/18/2025 1220    HCT 35.6 (L) 03/18/2025 1220     03/18/2025 1220     Lab Results   Component Value Date/Time    PROTIME 11.3 03/18/2025 1220    INR 1.0 03/18/2025 1220     Encounter Date: 03/18/25   ECG 12 Lead   Result Value    Ventricular Rate 58    Atrial Rate 58    IL Interval 196    QRS Duration 88    QT Interval 424    QTC Calculation(Bazett) 416    P Axis 49    R Axis -20    T Axis 17    QRS Count 9    Q Onset 219    P Onset 121    P Offset 177    T Offset 431    QTC Fredericia 419    Narrative    Sinus bradycardia  Nonspecific T wave abnormality  Abnormal ECG  When compared with ECG of 01-APR-2022 06:19,  No significant change was found  Confirmed by Nicholas Reyes ([id number]) on  3/25/2025 8:06:54 AM        Anesthesia Plan    History of general anesthesia?: yes  History of complications of general anesthesia?: no    ASA 3     general     intravenous induction   Anesthetic plan and risks discussed with patient.    Plan discussed with CAA.

## 2025-04-04 NOTE — INTERVAL H&P NOTE
H&P reviewed. The patient was examined and there are no changes to the H&P.      Elizabeth Braun MD  PGY-2 Urology [place]  Adult Urology Pager: [phone]  Pediatric Urology Pager: [phone]

## 2025-04-04 NOTE — OP NOTE
Nephrolithotomy - miniPCNL W/ HOLMIUM LASER (L) Operative Note     Date: 2025  OR Location: [facility]    Name: Mikaela Cárdenas, : 1955, Age: 69 y.o., MRN: [id number], Sex: female    Diagnosis  Pre-op Diagnosis      * Hematuria, unspecified type [R31.9]     * Nephrolithiasis [N20.0] Post-op Diagnosis     * Hematuria, unspecified type [R31.9]     * Nephrolithiasis [N20.0]     Procedures  Nephrolithotomy - miniPCNL W/ HOLMIUM LASER  37280 - MA PERQ NL/PL LITHOTRP COMPLEX >2 CM MLT LOCATIONS      Surgeons      * Stewart Reese - Primary    Resident/Fellow/Other Assistant:  Surgeons and Role:  * Elizabeth Braun - Resident; Assisting    Staff:   Circulator: Sherice Lam Person: Christina  Surgical Assistant: Kiersten Booth Circulator: Miguel A    Anesthesia Staff: Anesthesiologist: Carlos A Davis MD  C-AA: MARYLIN Quezada    Procedure Summary  Anesthesia: General  ASA: III  Estimated Blood Loss: 10 mL  Intra-op Medications:   Administrations occurring from 0730 to 0955 on 25:   Medication Name Total Dose   ioversol (Optiray-320) injection 20 mL   ceFAZolin (Ancef) 2 g in dextrose (iso)  mL 2 g   dexAMETHasone (Decadron) PF injection 10 mg/mL 4 mg   famotidine PF (Pepcid) injection 20 mg   fentaNYL (Sublimaze) injection 50 mcg/mL 100 mcg   glycopyrrolate (Robinul) injection 0.6 mg   LR bolus Cannot be calculated   lidocaine (Xylocaine) injection 2 % 60 mg   midazolam (Versed) injection 1 mg/mL 2 mg   neostigmine (Bloxiverz) 1 mg/mL injection 2 mg   ondansetron (Zofran) 2 mg/mL injection 4 mg   phenylephrine 100 mcg/mL syringe 10 mL (prefilled) 250 mcg   propofol (Diprivan) injection 10 mg/mL 150 mg   rocuronium (ZeMuron) 50 mg/5 mL injection 50 mg              Anesthesia Record               Intraprocedure I/O Totals          Intake    LR bolus 500.00 mL    ceFAZolin (Ancef) 2 g in dextrose (iso)  mL 100.00 mL    Total Intake 600 mL          Specimen:   ID Type Source Tests Collected by Time   A :  LEFT RENAL STONES FOR ANALYSIS Calculus Urine, Clean Catch CALCULI (STONE) ANALYSIS Stewart Reese MD 4/4/2025 0933                 Drains and/or Catheters:   Urethral Catheter Non-latex 16 Fr. (Active)       Tourniquet Times:         Implants:  Implants       Type Name Action Serial No.      Stent STENT, URETERAL CONTOUR, 6FR X 24CM - [id number] Implanted               Findings:   Stone size: Branching stone in left lower pole (approximately 15mm)  and additional lower pole stone (approximately 9mm) treated completely  Access location: Lower pole, below 12, posterior  Access approach: Endoscopic guided biplanar fluoroscopy  Sheath size: 17.5 [place] mini PCNL  Lithotripter: 550 µm holmium laser  Stone clearance: Excellent  Other findings: None    Indications: Mikaela Cárdenas is an 69 y.o. female who is having surgery for Hematuria, unspecified type [R31.9]  Nephrolithiasis [N20.0].     The patient was seen in the preoperative area. The risks, benefits, complications, treatment options, non-operative alternatives, expected recovery and outcomes were discussed with the patient. The possibilities of reaction to medication, pulmonary aspiration, injury to surrounding structures, bleeding, recurrent infection, the need for additional procedures, failure to diagnose a condition, and creating a complication requiring transfusion or operation were discussed with the patient. The patient concurred with the proposed plan, giving informed consent.  The site of surgery was properly noted/marked if necessary per policy. The patient has been actively warmed in preoperative area. Preoperative antibiotics have been ordered and given within 1 hours of incision. Venous thrombosis prophylaxis have been ordered including bilateral sequential compression devices    Procedure Details:     The patient was taken to the operating room where a time-out was performed using 2 patient identifiers.  They were then induced into general  endotracheal anesthesia, placed in frog-left position and prepped and draped in the usual sterile fashion.  Antibiotic prophylaxis were administered (IV cefazolin).    We began by inserting a cystoscope into the patient's bladder.  No obvious lesions or masses were seen on entry into the bladder. Attention was turned to the left ureteral orifice which was cannulated with a 5-Canadian open-ended catheter and sensor wire.  We removed the sensor wire, taking care to maintain the 5Fr ureteral catheter in place.  We then secured the catheter to the patient's inner thigh. The bladder was emptied using a bacon catheter. The patient was then repositioned into the prone position and was prepped and draped in the usual sterile fashion. Retrograde pyelogram was performed which showed a lower pole filling defect. Using our 5Fr catheter, we placed a sensor wire. We removed the 5Fr ureteral catheter and placed a 8/10 dilator over the sensor wire. After removing the inner lumen, we advanced a super stiff wire. The outer access was then removed. Using the super stiff wire, we placed a 38cm ureteral access sheath to the level of the proximal ureter. We then inserted the flexible ureteroscope and performed pan pyeloscopy. We confirmed that lower pole access would be ideal after finding a posterior lower pole calyx which would allow access to the primarily lower pole stone burden.     Using radiographic guidance and retrograde pyelogram, we were able to place a finder needle into the patient's lower pole calyx. There were 3 total attempts at placement including successful placement. This was placed under direct visualization using the ureteroscope. We then placed a sensor wire into the patient's renal pelvis and proximal ureter.  This was grasped with a ureteroscopic basket and brought to the level of the ureteral access sheath. We then incised the skin and placed a 8/10 Canadian dilator over the wire with direct visualization from the  ureteroscope.  We then placed a second super stiff wire for additional access.  Using the working wire, we placed a mini PCNL dilator and dilated the tract into the right pelvis.  We then placed an access sheath under direct visualization into the patient's collecting system.  We then secured our safety wire.  We then proceeded with the mini percutaneous nephrolithotomy.  We inserted a rigid nephroscope, and identified the aforementioned stone burden.  The stone burden was successfully aspirated out following fragmentation with a 365 micron laser fiber.     Stone fragments were sent for stone analysis.  We used the ureteroscope to performed systemic inspection of all the calices. We did not visualize any remaining stone burden.  We then placed a 6x24 double-J ureteral stent in a retrograde fashion confirming its coil in the bladder radiographically and visualized the proximal curl in the renal pelvis. Antegrade nephrostogram was performed which confirmed the stent to be in good position and no contrast was seen opacifying other intraabdominal structures. We then removed our safety wire, working wire and access sheath and applied pressure to the incision area for approximately 2 minutes.     We then reapproximated the skin with a single 4-0 Monocryl suture. Dermabond was applied to the incision site. Local anesthetic was infiltrated.    The patient was placed in supine position and a 16Fr bacon catheter was placed (10cc in the balloon). Patient was extubated and transferred to PACU in satisfactory condition.    Complications:  None; patient tolerated the procedure well.    Disposition: PACU - hemodynamically stable.  Condition: stable         Task Performed by RNFA or Surgical Assistant:  Positioning      Plan:   - discharge from PACU with analgesia  - follow up for stent removal and to discuss stone analysis    Dr. Reese was present and scrubbed for the duration of the procedure and performed all critical  steps.      Elizabeth Braun MD  PGY-2 Urology [place]  Adult Urology Pager: [phone]  Pediatric Urology Pager: [phone]

## 2025-04-07 ENCOUNTER — APPOINTMENT (OUTPATIENT)
Dept: UROLOGY | Facility: CLINIC | Age: 70
End: 2025-04-07
Payer: MEDICARE

## 2025-04-09 RX ORDER — LIDOCAINE HYDROCHLORIDE 20 MG/ML
1 JELLY TOPICAL ONCE
Status: COMPLETED | OUTPATIENT
Start: 2025-04-10 | End: 2025-04-10

## 2025-04-10 ENCOUNTER — PROCEDURE VISIT (OUTPATIENT)
Dept: UROLOGY | Facility: CLINIC | Age: 70
End: 2025-04-10
Payer: MEDICARE

## 2025-04-10 VITALS — SYSTOLIC BLOOD PRESSURE: 134 MMHG | HEART RATE: 98 BPM | DIASTOLIC BLOOD PRESSURE: 81 MMHG | TEMPERATURE: 97 F

## 2025-04-10 DIAGNOSIS — N39.0 FREQUENT UTI: ICD-10-CM

## 2025-04-10 DIAGNOSIS — N20.0 NEPHROLITHIASIS: ICD-10-CM

## 2025-04-10 LAB
APPEARANCE STONE: NORMAL
COMPN STONE: NORMAL
SPECIMEN WT: 865 MG

## 2025-04-10 PROCEDURE — 52310 CYSTOSCOPY AND TREATMENT: CPT | Mod: 58 | Performed by: UROLOGY

## 2025-04-10 PROCEDURE — 2500000005 HC RX 250 GENERAL PHARMACY W/O HCPCS: Performed by: UROLOGY

## 2025-04-10 PROCEDURE — 99214 OFFICE O/P EST MOD 30 MIN: CPT | Mod: 24 | Performed by: UROLOGY

## 2025-04-10 RX ORDER — SODIUM BICARBONATE 650 MG/1
650 TABLET ORAL 3 TIMES DAILY
Qty: 180 TABLET | Refills: 5 | Status: SHIPPED | OUTPATIENT
Start: 2025-04-10 | End: 2026-04-10

## 2025-04-10 RX ORDER — ESTRADIOL 0.1 MG/G
CREAM VAGINAL
Qty: 127.5 G | Refills: 3 | Status: SHIPPED | OUTPATIENT
Start: 2025-04-10 | End: 2026-04-10

## 2025-04-10 RX ADMIN — LIDOCAINE HYDROCHLORIDE 1 APPLICATION: 20 JELLY TOPICAL at 13:00

## 2025-04-10 ASSESSMENT — ENCOUNTER SYMPTOMS
OCCASIONAL FEELINGS OF UNSTEADINESS: 0
LOSS OF SENSATION IN FEET: 0
DEPRESSION: 0

## 2025-04-10 ASSESSMENT — PATIENT HEALTH QUESTIONNAIRE - PHQ9
SUM OF ALL RESPONSES TO PHQ9 QUESTIONS 1 AND 2: 0
2. FEELING DOWN, DEPRESSED OR HOPELESS: NOT AT ALL
1. LITTLE INTEREST OR PLEASURE IN DOING THINGS: NOT AT ALL

## 2025-04-10 NOTE — PATIENT INSTRUCTIONS
Patient-Friendly Summary of Our Plan    Ms. Cárdenas, here is a summary of our plan to help manage your kidney stones and recurrent urinary tract infections (UTIs):    1. Kidney Stones:  - We successfully treated the stones in your left kidney with a procedure on 04/04/2025.  - Your stones are made of uric acid, and we will use sodium bicarbonate (baking soda) pills to help dissolve the remaining stones in your right kidney. These pills are smaller and easier to swallow than the potassium citrate you used before.  - You will take one 650mg sodium bicarbonate pill three times a day.  - We will schedule an ultrasound of your kidneys in about six weeks to check on the progress.    2. Recurrent UTIs:  - To help prevent future UTIs, we are prescribing a vaginal estrogen cream.  - Apply a pea-sized amount of the cream inside your vagina and at the opening where you urinate every night for two weeks. After that, use it on Monday, Wednesday, and Friday nights indefinitely.  - This cream helps restore healthy bacteria and reduce the risk of infections. It might sting a bit at first, but this usually goes away with continued use.    3. Follow-Up:  - You will have a follow-up appointment with our Physician Assistant, Paul Mckeon, who will monitor your kidney stone prevention and overall progress.    If you have any questions or concerns, please feel free to reach out. We are here to help you through this process.

## 2025-04-10 NOTE — PROGRESS NOTES
"PAST UROLOGICAL HISTORY:  Ms. Cárdenas is a 69-year-old woman with polycystic kidney disease and polycystic liver disease who presented on 02/24/2025 with gross hematuria and recurrent UTIs. CT urogram on 02/03/2025 showed bilateral non-obstructing stones including a left 8mm midpole stone, 1.7cm branching lower pole stone, and multiple smaller right-sided stones. She has a history of uric acid stones previously managed with potassium citrate. Cystoscopy on 02/24/2025 showed cystitis cystica but no tumors or stones.    I have independently reviewed and interpreted the patient's prior imaging studies, laboratory results, and operative reports as noted herein.    HPI TODAY 04/10/2025:    Nephrolithiasis:  - Ms. Cárdenas is status post left mini PCNL performed on 04/04/2025 for treatment of multiple branching stones, which appeared to be completely treated intraoperatively.  - Stone analysis confirmed uric acid nephrolithiasis.  - Pre-operative urinalysis showed pH of 5.5.  - She reports improvement in symptoms with some pain in the first few days post-operatively that has now resolved.  - She discontinued potassium citrate previously due to difficulty swallowing the tablets.  - Right kidney still has multiple smaller non-obstructing stones that will be managed medically.    Recurrent UTIs:  - History of recurrent UTIs, likely related to stone burden.  - Reports being \"very sensitive\" in the genital area.  - Confirms history of frequent UTIs and hematuria.  - Most recent urine culture on 02/20/2025 was positive for E. coli.    PSA History:  - Not applicable for female patient.    Testosterone History:  - Not applicable for female patient.    PAST MEDICAL HISTORY:  - Polycystic kidney disease  - Polycystic liver disease  - History of uric acid stones  - No smoking history  - Current medications: sertraline, semaglutide (Wegovy)    SOCIAL:  - Lives in [place]    REVIEW OF SYSTEMS: A tailored review of systems was performed and " all pertinent positives and negatives are listed in the HPI.    PHYSICAL EXAMINATION:  Gen: NAD  Pulm: No increased WOB on RA  Cards: [facility]  Patient ID: Mikaela Cárdenas is a 69 y.o. female.    Cystoscopy    Date/Time: 4/10/2025 1:29 PM    Performed by: Stewart Reese MD  Authorized by: Stewart Reese MD    Procedure - Bladder Cystoscopy:     Procedure details: simple removal of a foreign body, stone, or stent    Comments:      Patient's genitalia were prepped and draped in the usual sterile fashion. A 15 Kinyarwanda flexible cystoscope was passed atraumatically per the urethra. We entered the bladder and identified the previously placed ureteral stent emanating from the kleft ureteral orifice. It was grasped with a flexible grasper and removed intact. Patient tolerated the procedure without difficulty.          ASSESSMENT/PLAN:    Nephrolithiasis, Left Kidney (N20.0)  - Assessment: Status post successful left mini PCNL on 04/04/2025 for multiple branching stones. Stone analysis confirmed uric acid composition.  - Plan:    - Sodium bicarbonate 650mg three times daily to replace potassium citrate (which patient could not tolerate due to pill size)    - Renal ultrasound in 6-8 weeks    - Follow-up with NEREIDA Mckeon for kidney stone prevention management, ensure urine pH responding appropriately  - Counseling: Discussed the importance of urinary alkalinization to prevent recurrence of uric acid stones. Sodium bicarbonate will help dissolve remaining right-sided stones and prevent recurrence by raising urinary pH.    Recurrent UTIs (N39.0)  - Assessment: History of recurrent E. coli UTIs likely related to stone burden and possible vaginal atrophy.  - Plan:    - Vaginal estrogen cream prescribed    - Instructions: Apply pea-sized amount inside vagina and at urethral meatus nightly for 2 weeks, then Monday/Wednesday/Friday indefinitely  - Counseling: Discussed mechanism of vaginal estrogen in restoring normal vaginal chey  and reducing UTI risk. Warned about possible initial stinging sensation that typically resolves with continued use.

## 2025-04-12 ENCOUNTER — OFFICE VISIT (OUTPATIENT)
Dept: PRIMARY CARE | Facility: CLINIC | Age: 70
End: 2025-04-12
Payer: MEDICARE

## 2025-04-12 VITALS
TEMPERATURE: 97.7 F | RESPIRATION RATE: 16 BRPM | HEART RATE: 72 BPM | SYSTOLIC BLOOD PRESSURE: 132 MMHG | HEIGHT: 63 IN | DIASTOLIC BLOOD PRESSURE: 80 MMHG | BODY MASS INDEX: 29.95 KG/M2 | OXYGEN SATURATION: 98 % | WEIGHT: 169 LBS

## 2025-04-12 DIAGNOSIS — R31.0 GROSS HEMATURIA: Primary | ICD-10-CM

## 2025-04-12 DIAGNOSIS — R11.2 NAUSEA AND VOMITING, UNSPECIFIED VOMITING TYPE: ICD-10-CM

## 2025-04-12 DIAGNOSIS — Z87.442 HISTORY OF KIDNEY STONES: ICD-10-CM

## 2025-04-12 DIAGNOSIS — R39.9 UTI SYMPTOMS: ICD-10-CM

## 2025-04-12 LAB
POC APPEARANCE, URINE: CLEAR
POC BILIRUBIN, URINE: ABNORMAL
POC BLOOD, URINE: ABNORMAL
POC COLOR, URINE: YELLOW
POC GLUCOSE, URINE: NEGATIVE MG/DL
POC KETONES, URINE: NEGATIVE MG/DL
POC LEUKOCYTES, URINE: ABNORMAL
POC NITRITE,URINE: NEGATIVE
POC PH, URINE: 6 PH
POC PROTEIN, URINE: ABNORMAL MG/DL
POC SPECIFIC GRAVITY, URINE: >=1.03
POC UROBILINOGEN, URINE: 1 EU/DL

## 2025-04-12 PROCEDURE — 81003 URINALYSIS AUTO W/O SCOPE: CPT | Performed by: NURSE PRACTITIONER

## 2025-04-12 PROCEDURE — 99213 OFFICE O/P EST LOW 20 MIN: CPT | Performed by: NURSE PRACTITIONER

## 2025-04-12 RX ORDER — ONDANSETRON 8 MG/1
8 TABLET, ORALLY DISINTEGRATING ORAL EVERY 8 HOURS PRN
Qty: 20 TABLET | Refills: 0 | Status: SHIPPED | OUTPATIENT
Start: 2025-04-12 | End: 2025-04-19

## 2025-04-12 RX ORDER — CIPROFLOXACIN 500 MG/1
500 TABLET ORAL 2 TIMES DAILY
Qty: 20 TABLET | Refills: 0 | Status: SHIPPED | OUTPATIENT
Start: 2025-04-12 | End: 2025-04-22

## 2025-04-12 ASSESSMENT — PATIENT HEALTH QUESTIONNAIRE - PHQ9
SUM OF ALL RESPONSES TO PHQ9 QUESTIONS 1 AND 2: 0
2. FEELING DOWN, DEPRESSED OR HOPELESS: NOT AT ALL
1. LITTLE INTEREST OR PLEASURE IN DOING THINGS: NOT AT ALL
SUM OF ALL RESPONSES TO PHQ9 QUESTIONS 1 AND 2: 0
2. FEELING DOWN, DEPRESSED OR HOPELESS: NOT AT ALL
1. LITTLE INTEREST OR PLEASURE IN DOING THINGS: NOT AT ALL

## 2025-04-12 ASSESSMENT — ENCOUNTER SYMPTOMS
OCCASIONAL FEELINGS OF UNSTEADINESS: 0
LOSS OF SENSATION IN FEET: 0
DEPRESSION: 0

## 2025-04-12 ASSESSMENT — PAIN SCALES - GENERAL: PAINLEVEL_OUTOF10: 0-NO PAIN

## 2025-04-12 NOTE — PROGRESS NOTES
Subjective   Patient ID: Mikaela Cárdenas is a 69 y.o. female who presents for UTI.  Symptoms: fever, nausea, vomiting, pain on left side (subsided), and loss of appetite   Length of symptoms: 2 days   OTC: Zofran   Related information: Friday kidney surgery        HPI     Review of Systems    Objective   LMP  (LMP Unknown)     Physical Exam    Assessment/Plan

## 2025-04-12 NOTE — PROGRESS NOTES
Subjective   Patient ID: Mikaela Cárdenas is a 69 y.o. female who is with complaint of symptoms of UTI.    HPI   Patient is a 69 y.o. female who CONSULTED AT [facility] OFFICE [facility]INIC today. Patient is with symptoms of blood tinged urine, nausea, vomiting, decreased appetite, and fever which started yesterday. She states that she underwent surgical removal of stone from the left kidney last April 4 (8 days ago). A double-J ureteral stent was placed intra-op. She was discharged to home from the hospital. On April 10 (2 days ago), the ureteral stent was pulled out via cystoscopy. Patient went home after procedure. Yesterday (1 day ago), patient started to have the symptoms.    Patient denies having any burning sensation on urination, increased urinary frequency, urgency of urination, sensation of inadequate emptying post voiding, lower abdominal discomfort (dysuria), nocturia, low back pain, flank pain, incontinence, cloudy urine, nor chills. Patient has taken zofran.     Review of Systems  General: no weight loss, generally healthy, no fatigue  Head:  no headaches / sinus pain, no vertigo, no injury  Eyes: no diplopia, no tearing, no pain,   Ears: no change in hearing, no tinnitus, no bleeding, no vertigo  Mouth:  no dental difficulties, no gingival bleeding, no sore throat, no loss of sense of taste  Nose: no congestion, no  discharge, no bleeding, no obstruction, no loss of sense of smell  Neck: no stiffness, no pain, no tenderness, no masses, no bruit  Pulmonary: no dyspnea, no wheezing, no hemoptysis, no cough  Cardiovascular: no chest pain, no palpitations, no syncope, no orthopnea  Gastrointestinal: (+) decreased appetite, no dysphagia, no abdominal pains, no diarrhea, (+) nausea, (+) vomiting, no melena  Genito Urinary: (+) hematuria, no frequency, no urgency of urination, no sensation of inadequate emptying post voiding, no lower abdominal discomfort (dysuria), no nocturia, no low back pain, no flank  "pain, no incontinence, no cloudy urine,   Musculoskeletal: no muscle ache, no joint pain, no limitation of range of motion, no paresthesia, no numbness  Constitutional: no fever, no chills, no night sweats    Objective   /80   Pulse 72   Temp 36.5 °C (97.7 °F)   Resp 16   Ht 1.6 m (5' 3\")   Wt 76.7 kg (169 lb)   LMP  (LMP Unknown)   SpO2 98%   BMI 29.94 kg/m²     Physical Exam  General: ambulatory, in no acute distress  Head: normocephalic, no lesions  Eyes: pink palpebral conjunctiva, anicteric sclerae, PERRLA, EOM's full  Ears: clear external auditory canals, no ear discharge, no bleeding from the ears, tympanic membrane intact  Nose: nasal mucosa normal, no nasal discharge, no bleeding, no obstruction  Throat: clear, no exudate, no lesions  Neck: supple, no masses, no bruits  Chest: symmetrical chest expansion, no lagging, no retractions, clear breath sounds, no rales, no wheezes  Heart: normal rate, regular rhythm, no heaves, no thrills, no murmurs  Abdomen: flat, NABS, soft, no direct tenderness, no rebound tenderness, no mass palpated, SIGNS: no Bowie, no Rovsings, no Psoas, no Obturator; No CVA tenderness,    Assessment/Plan   Problem List Items Addressed This Visit             ICD-10-CM    Hematuria - Primary R31.9    Relevant Medications    ciprofloxacin (Cipro) 500 mg tablet    Other Relevant Orders    POCT UA Automated manually resulted (Completed)    Urine Culture     Other Visit Diagnoses         Codes    UTI symptoms     R39.9    Relevant Medications    ciprofloxacin (Cipro) 500 mg tablet    Other Relevant Orders    POCT UA Automated manually resulted (Completed)    Urine Culture    History of kidney stones     Z87.442    Relevant Medications    ciprofloxacin (Cipro) 500 mg tablet    Other Relevant Orders    POCT UA Automated manually resulted (Completed)    Urine Culture        Urinalysis was done at the office today. Urinalysis result explained and discussed with patient. Urine sample " submitted to laboratory for culture and sensitivity study. The laboratory examination requested were explained and discussed with patient.    DISCHARGE SUMMARY:   Patient seen and examined. Probable diagnosis, differential diagnosis, treatment, treatment options, and probable complications were discussed and explained to patient. she was to take medication/s associated with this visit. she may take over-the-counter pain and/or fever medication if needed. Advised increased oral fluid intake (2 liters of water or more per day) if with no nausea nor vomiting. Reinforced to continue personal hygiene. Patient to return to clinic if there is worsening or persistence of symptoms. Patient verbalized understanding.    Patient to come back in 7 - 10 days if needed for worsening symptoms.

## 2025-04-13 NOTE — PATIENT INSTRUCTIONS
DISCHARGE SUMMARY:   Patient seen and examined. Probable diagnosis, differential diagnosis, treatment, treatment options, and probable complications were discussed and explained to patient. she was to take medication/s associated with this visit. she may take over-the-counter pain and/or fever medication if needed. Advised increased oral fluid intake (2 liters of water or more per day) if with no nausea nor vomiting. Reinforced to continue personal hygiene. Patient to return to clinic if there is worsening or persistence of symptoms. Patient verbalized understanding.    Patient to come back in 7 - 10 days if needed for worsening symptoms.

## 2025-04-14 ENCOUNTER — CLINICAL SUPPORT (OUTPATIENT)
Dept: PRIMARY CARE | Facility: CLINIC | Age: 70
End: 2025-04-14
Payer: MEDICARE

## 2025-04-14 DIAGNOSIS — N39.0 FREQUENT UTI: ICD-10-CM

## 2025-04-14 NOTE — PROGRESS NOTES
Subjective   Patient ID: Mikaela Cárdenas is a 69 y.o. female who presents for No chief complaint on file..    HPI     Review of Systems    Objective   LMP  (LMP Unknown)     Physical Exam    Assessment/Plan

## 2025-04-15 ENCOUNTER — APPOINTMENT (OUTPATIENT)
Dept: PHARMACY | Facility: HOSPITAL | Age: 70
End: 2025-04-15
Payer: MEDICARE

## 2025-04-15 PROCEDURE — RXMED WILLOW AMBULATORY MEDICATION CHARGE

## 2025-04-15 RX ORDER — SEMAGLUTIDE 1 MG/.5ML
1 INJECTION, SOLUTION SUBCUTANEOUS WEEKLY
Qty: 6 ML | Refills: 0 | Status: SHIPPED | OUTPATIENT
Start: 2025-04-15 | End: 2025-07-02

## 2025-04-15 NOTE — PROGRESS NOTES
Clinical Pharmacy Appointment    Patient ID: Mikaela Cárdenas is a 69 y.o. female who presents for Weight Loss.    Referring Provider: Daija Spencer, *  PCP: Daija Spencer MD   Last visit with PCP: 11.11.2024   Next visit with PCP: Not scheduled     Patient Assistance for Wegovy approved through 12/2/2025. Will have to be renewed prior to that date to prevent lapse in coverage. Medication(s) will be received at no cost to patient from [facility].    Subjective   Interval History:  4/4 - had surgery: nephrolithotomy  Stopped Wegovy for surgery  Surgery went well, pain mostly gone  Re-started Wegovy 1 mg  GI symptoms mostly resolved  Reports appetite suppression continues    HPI  WEIGHT LOSS  BMI Readings from Last 3 Encounters:   04/12/25 29.94 kg/m²   04/04/25 29.76 kg/m²   03/18/25 30.30 kg/m²      Starting weight: 184 lbs. (11.11.2024)  December weight: 178 lbs. (12.24.2024)  January weight: 174 lbs (1.14.2025)  Current weight: 168 lbs. (3.11.2025)    Lifestyle  Diet: Using Weight Watchers currently    Non-Pharmacological Therapy  Weight loss techniques attempted:  Self-directed dieting: Weight Watchers    Pharmacological Therapy  Current Medications: Wegovy 1 mg subcutaneously once weekly on Tuesdays  Adverse Effects: Denies    Insurance coverage of weight-loss medications? Yes, Medicare covers for reduction of cardiovascular disease   Eligible for copay cards/programs? No, government funded insurance  Eligible for  PAP? Yes, reports income <400% FPL    Drug Interactions  No relevant drug interactions were noted.    Objective   No Known Allergies    Social History     Social History Narrative    Not on file      Medication Review  Current Outpatient Medications   Medication Instructions    alendronate (FOSAMAX) 70 mg, oral, Every 7 days, Take in the morning with a full glass of water, on an empty stomach, and do not take anything else by mouth or lie down for the next 30 min.     "ALPRAZolam (XANAX) 0.5 mg, oral, 2 times daily PRN    aspirin 81 mg EC tablet 1 tablet, Daily    atorvastatin (LIPITOR) 80 mg, oral, Daily    ciprofloxacin (CIPRO) 500 mg, oral, 2 times daily    estradiol (Estrace) 0.01 % (0.1 mg/gram) vaginal cream Apply pea sized amount nightly for 2 weeks, then 3 times per week.    lisinopril 5 mg, oral, Daily    metoprolol succinate XL (TOPROL-XL) 25 mg, oral, Daily    nitroglycerin (NITROSTAT) 0.4 mg, sublingual, Every 5 min PRN, FOR CHEST PAIN    ondansetron ODT (ZOFRAN-ODT) 8 mg, oral, Every 8 hours PRN    oxybutynin XL (DITROPAN-XL) 10 mg, oral, Daily, Do not crush, chew, or split.    oxyCODONE (ROXICODONE) 5 mg, oral, Every 6 hours PRN    phenazopyridine (PYRIDIUM) 200 mg, oral, 3 times daily PRN    potassium citrate CR (Urocit-K-15) 15 mEq ER tablet 30 mEq, oral, 2 times daily (morning and late afternoon)    sertraline (ZOLOFT) 100 mg, oral, Daily    sodium bicarbonate 650 mg, oral, 3 times daily    tamsulosin (FLOMAX) 0.4 mg, oral, Daily    traZODone (DESYREL) 50 mg, oral, Nightly PRN    Wegovy 1 mg, subcutaneous, Weekly      Vitals  BP Readings from Last 2 Encounters:   04/12/25 132/80   04/10/25 134/81     BMI Readings from Last 1 Encounters:   04/12/25 29.94 kg/m²      Labs  A1C  Lab Results   Component Value Date    HGBA1C 5.4 11/11/2024    HGBA1C 5.2 05/07/2024    HGBA1C 5.4 05/09/2023     BMP  Lab Results   Component Value Date    CALCIUM 9.1 03/18/2025     03/18/2025    K 4.1 03/18/2025    CO2 26 03/18/2025     03/18/2025    BUN 22 03/18/2025    CREATININE 0.83 03/18/2025    EGFR 76 03/18/2025     LFTs  Lab Results   Component Value Date    ALT 21 11/11/2024    AST 15 11/11/2024    ALKPHOS 91 11/11/2024    BILITOT 0.4 11/11/2024     FLP  Lab Results   Component Value Date    TRIG 98 05/07/2024    CHOL 125 05/07/2024    LDLF 66 05/09/2023    LDLCALC 65 05/07/2024    HDL 40.9 05/07/2024     Urine Microalbumin  No results found for: \"MICROALBCREA\"  Weight " Management  Wt Readings from Last 3 Encounters:   04/12/25 76.7 kg (169 lb)   04/04/25 76.2 kg (168 lb)   03/18/25 77.6 kg (171 lb 1.2 oz)      There is no height or weight on file to calculate BMI.     Assessment/Plan   Problem List Items Addressed This Visit       BMI 33.0-33.9,adult     Current regimen includes Wegovy 1 mg subcutaneously once weekly. Patient's current weight reported as 168 pounds on 3.11.25. Has lost about 16 pounds since starting therapy (~8.7% change in total body weight). Will plan to continue current therapy today as patient still losing weight on current dose and reporting appetite suppression. Will aim to reach maintenance dose at follow-up.    Medication Changes:  CONTINUE  Wegovy 1 mg subcutaneously once weekly. Prescription sent to [facility] pharmacy for delivery.    Future Considerations:  Titrate GLP1a as needed    Monitoring and Education:  Provided detailed dosing and administration counseling to ensure proper technique.   Reviewed Wegovy titration schedule, starting with 2.5 mg once weekly to a goal of 2.4 mg once weekly if tolerated  Counseled patient on the benefits of GLP-1ra glycemic control and weight loss  Reviewed storage requirements of Wegovy when not in use, and when to administer the medication if a dose is missed.  Advised patient that they may experience improved satiety after meals and portion sizes of meals may be reduced as doses of Wegovy increase.  Counseled patient on relevant MOA, expectations, side effects, duration of therapy, contraindications, administration, and monitoring parameters.  All questions and concerns addressed. Contact pharmacist with any further questions or concerns prior to next appointment.           Clinical Pharmacist follow-up: 6.10.2025 - 11 am, Telehealth visit    Continue all meds under the continuation of care with the referring provider and clinical pharmacy team.    Thank you,  Prachi Gutierrez, PharmD  Clinical  Pharmacist  [phone]    Verbal consent to manage patient's drug therapy was obtained from the patient. They were informed they may decline to participate or withdraw from participation in pharmacy services at any time.

## 2025-04-15 NOTE — ASSESSMENT & PLAN NOTE
Current regimen includes Wegovy 1 mg subcutaneously once weekly. Patient's current weight reported as 168 pounds on 3.11.25. Has lost about 16 pounds since starting therapy (~8.7% change in total body weight). Will plan to continue current therapy today as patient still losing weight on current dose and reporting appetite suppression. Will aim to reach maintenance dose at follow-up.    Medication Changes:  CONTINUE  Wegovy 1 mg subcutaneously once weekly. Prescription sent to [facility] pharmacy for delivery.    Future Considerations:  Titrate GLP1a as needed    Monitoring and Education:  Provided detailed dosing and administration counseling to ensure proper technique.   Reviewed Wegovy titration schedule, starting with 2.5 mg once weekly to a goal of 2.4 mg once weekly if tolerated  Counseled patient on the benefits of GLP-1ra glycemic control and weight loss  Reviewed storage requirements of Wegovy when not in use, and when to administer the medication if a dose is missed.  Advised patient that they may experience improved satiety after meals and portion sizes of meals may be reduced as doses of Wegovy increase.  Counseled patient on relevant MOA, expectations, side effects, duration of therapy, contraindications, administration, and monitoring parameters.  All questions and concerns addressed. Contact pharmacist with any further questions or concerns prior to next appointment.

## 2025-04-17 ENCOUNTER — PHARMACY VISIT (OUTPATIENT)
Dept: PHARMACY | Facility: CLINIC | Age: 70
End: 2025-04-17
Payer: COMMERCIAL

## 2025-05-13 ENCOUNTER — TELEPHONE (OUTPATIENT)
Dept: PRIMARY CARE | Facility: CLINIC | Age: 70
End: 2025-05-13
Payer: MEDICARE

## 2025-05-22 ENCOUNTER — APPOINTMENT (OUTPATIENT)
Dept: RADIOLOGY | Facility: HOSPITAL | Age: 70
End: 2025-05-22
Payer: MEDICARE

## 2025-05-25 DIAGNOSIS — F51.01 PRIMARY INSOMNIA: ICD-10-CM

## 2025-05-25 DIAGNOSIS — F43.20 GRIEF REACTION: ICD-10-CM

## 2025-05-27 ENCOUNTER — HOSPITAL ENCOUNTER (OUTPATIENT)
Dept: RADIOLOGY | Facility: HOSPITAL | Age: 70
Discharge: HOME | End: 2025-05-27
Payer: MEDICARE

## 2025-05-27 ENCOUNTER — APPOINTMENT (OUTPATIENT)
Dept: PHARMACY | Facility: HOSPITAL | Age: 70
End: 2025-05-27
Payer: MEDICARE

## 2025-05-27 DIAGNOSIS — N20.0 NEPHROLITHIASIS: ICD-10-CM

## 2025-05-27 PROCEDURE — RXMED WILLOW AMBULATORY MEDICATION CHARGE

## 2025-05-27 PROCEDURE — 76770 US EXAM ABDO BACK WALL COMP: CPT | Performed by: RADIOLOGY

## 2025-05-27 PROCEDURE — 76770 US EXAM ABDO BACK WALL COMP: CPT

## 2025-05-27 RX ORDER — SERTRALINE HYDROCHLORIDE 100 MG/1
100 TABLET, FILM COATED ORAL DAILY
Qty: 90 TABLET | Refills: 1 | Status: SHIPPED | OUTPATIENT
Start: 2025-05-27

## 2025-05-27 RX ORDER — TRAZODONE HYDROCHLORIDE 50 MG/1
50 TABLET ORAL NIGHTLY PRN
Qty: 90 TABLET | Refills: 1 | Status: SHIPPED | OUTPATIENT
Start: 2025-05-27

## 2025-05-27 RX ORDER — SEMAGLUTIDE 1.7 MG/.75ML
1.7 INJECTION, SOLUTION SUBCUTANEOUS WEEKLY
Qty: 3 ML | Refills: 0 | Status: SHIPPED | OUTPATIENT
Start: 2025-05-27

## 2025-05-27 NOTE — PROGRESS NOTES
Clinical Pharmacy Appointment    Patient ID: Mikaela Cárdenas is a 69 y.o. female who presents for Weight Loss.    Referring Provider: Daija Spencer,   PCP: Daija Spencer MD   Last visit with PCP: 11/11/24  Next visit with PCP: Not scheduled      Patient Assistance for Wegovy approved through 12/2/2025. Will have to be renewed prior to that date to prevent lapse in coverage. Medication(s) will be received at no cost to patient from [facility].    Subjective     Interval History:  Re-started Wegovy 1 mg  GI symptoms resolved  Reports appetite suppression has waned as well as weight loss; would like to increase to next dose up    WEIGHT LOSS  BMI Readings from Last 3 Encounters:   04/12/25 29.94 kg/m²   04/04/25 29.76 kg/m²   03/18/25 30.30 kg/m²      Starting weight: 184 lbs. (11.11.2024)  December weight: 178 lbs. (12.24.2024)  January weight: 174 lbs (1.14.2025)  Current weight: 168 lbs. (3.11.2025)     Lifestyle  Diet: Using Weight Watchers currently     Non-Pharmacological Therapy  Weight loss techniques attempted:  Self-directed dieting: Weight Watchers     Pharmacological Therapy  Current Medications: Wegovy 1 mg subcutaneously once weekly on Tuesdays  Adverse Effects: Denies     Insurance coverage of weight-loss medications? Yes, Medicare covers for reduction of cardiovascular disease   Eligible for copay cards/programs? No, government funded insurance  Eligible for  PAP? Yes, reports income <400% FPL     Drug Interactions  No relevant drug interactions were noted.    Objective     LMP  (LMP Unknown)      Labs  Lab Results   Component Value Date    BILITOT 0.4 11/11/2024    CALCIUM 9.1 03/18/2025    CO2 26 03/18/2025     03/18/2025    CREATININE 0.83 03/18/2025    GLUCOSE 93 03/18/2025    ALKPHOS 91 11/11/2024    K 4.1 03/18/2025    PROT 6.7 11/11/2024     03/18/2025    AST 15 11/11/2024    ALT 21 11/11/2024    BUN 22 03/18/2025    ANIONGAP 12 03/18/2025    ALBUMIN 4.1  11/11/2024    GFRF 64 05/09/2023     Lab Results   Component Value Date    TRIG 98 05/07/2024    CHOL 125 05/07/2024    LDLCALC 65 05/07/2024    HDL 40.9 05/07/2024     Lab Results   Component Value Date    HGBA1C 5.4 11/11/2024       Medications Ordered Prior to Encounter[1]     Assessment/Plan   Problem List Items Addressed This Visit           ICD-10-CM    BMI 33.0-33.9,adult Z68.33    Current regimen includes Wegovy 1 mg subcutaneously once weekly. Patient's current weight reported as 168 pounds on 3/11/25. Has lost about 16 pounds since starting therapy (~8.7% change in total body weight).     Will plan to increase Wegovy dose today as patient is no longer losing weight on current dose and reporting less appetite suppression.      Medication Changes:    INCREASE  Wegovy to 1.7 mg subcutaneously once weekly. Prescription sent to [facility] pharmacy for delivery.     Future Considerations:  Titrate GLP1a as needed     Monitoring and Education:  Provided detailed dosing and administration counseling to ensure proper technique.   Reviewed Wegovy titration schedule, starting with 2.5 mg once weekly to a goal of 2.4 mg once weekly if tolerated  Counseled patient on the benefits of GLP-1ra glycemic control and weight loss  Reviewed storage requirements of Wegovy when not in use, and when to administer the medication if a dose is missed.  Advised patient that they may experience improved satiety after meals and portion sizes of meals may be reduced as doses of Wegovy increase.  Counseled patient on relevant MOA, expectations, side effects, duration of therapy, contraindications, administration, and monitoring parameters.  All questions and concerns addressed. Contact pharmacist with any further questions or concerns prior to next appointment.           Merly Wood, KavonD  Clinical Ambulatory Care Pharmacist  Ph: [phone]    Continue all meds under the continuation of care with the referring provider and  clinical pharmacy team.    Clinical Pharmacist follow up: 6/17/25 or sooner as needed based on clinical intervention  Type of Encounter:  Telephone    Verbal consent to manage patient's drug therapy was obtained from the patient. They were informed they may decline to participate or withdraw from participation in pharmacy services at any time.       [1]   Current Outpatient Medications on File Prior to Visit   Medication Sig Dispense Refill    alendronate (Fosamax) 70 mg tablet Take 1 tablet (70 mg) by mouth every 7 days. Take in the morning with a full glass of water, on an empty stomach, and do not take anything else by mouth or lie down for the next 30 min. 30 tablet 3    ALPRAZolam (Xanax) 0.5 mg tablet Take 1 tablet (0.5 mg) by mouth 2 times a day as needed for sleep. 30 tablet 1    aspirin 81 mg EC tablet Take 1 tablet (81 mg) by mouth once daily.      atorvastatin (Lipitor) 80 mg tablet Take 1 tablet (80 mg) by mouth once daily. 90 tablet 3    estradiol (Estrace) 0.01 % (0.1 mg/gram) vaginal cream Apply pea sized amount nightly for 2 weeks, then 3 times per week. 127.5 g 3    lisinopril 5 mg tablet Take 1 tablet (5 mg) by mouth once daily. 90 tablet 3    metoprolol succinate XL (Toprol-XL) 25 mg 24 hr tablet Take 1 tablet (25 mg) by mouth once daily. 90 tablet 3    nitroglycerin (Nitrostat) 0.4 mg SL tablet Place 1 tablet (0.4 mg) under the tongue every 5 minutes if needed for chest pain. FOR CHEST PAIN 25 tablet 0    oxybutynin XL (Ditropan-XL) 10 mg 24 hr tablet Take 1 tablet (10 mg) by mouth once daily. Do not crush, chew, or split. 10 tablet 0    oxyCODONE (Roxicodone) 5 mg immediate release tablet Take 1 tablet (5 mg) by mouth every 6 hours if needed for severe pain (7 - 10). 7 tablet 0    phenazopyridine (Pyridium) 200 mg tablet Take 1 tablet (200 mg) by mouth 3 times a day as needed (Burning with urination). 20 tablet 0    potassium citrate CR (Urocit-K-15) 15 mEq ER tablet Take 2 tablets (30 mEq) by  mouth 2 times daily (morning and late afternoon). 360 tablet 1    semaglutide, weight loss, (Wegovy) 1 mg/0.5 mL pen injector Inject 1 mg under the skin 1 (one) time per week for 12 doses. 6 mL 0    sertraline (Zoloft) 100 mg tablet TAKE ONE TABLET BY MOUTH once DAILY 90 tablet 1    sodium bicarbonate 650 mg tablet Take 1 tablet (650 mg) by mouth 3 times a day. 180 tablet 5    tamsulosin (Flomax) 0.4 mg 24 hr capsule Take 1 capsule (0.4 mg) by mouth once daily. 7 capsule 0    traZODone (Desyrel) 50 mg tablet TAKE ONE TABLET BY MOUTH AT BEDTIME AS NEEDED for sleep 90 tablet 1    [DISCONTINUED] sertraline (Zoloft) 100 mg tablet Take 1 tablet (100 mg) by mouth once daily. 90 tablet 1    [DISCONTINUED] traZODone (Desyrel) 50 mg tablet Take 1 tablet (50 mg) by mouth as needed at bedtime for sleep. 90 tablet 1     No current facility-administered medications on file prior to visit.

## 2025-05-27 NOTE — ASSESSMENT & PLAN NOTE
Current regimen includes Wegovy 1 mg subcutaneously once weekly. Patient's current weight reported as 168 pounds on 3/11/25. Has lost about 16 pounds since starting therapy (~8.7% change in total body weight).     Will plan to increase Wegovy dose today as patient is no longer losing weight on current dose and reporting less appetite suppression.      Medication Changes:    INCREASE  Wegovy to 1.7 mg subcutaneously once weekly. Prescription sent to [facility] pharmacy for delivery.     Future Considerations:  Titrate GLP1a as needed     Monitoring and Education:  Provided detailed dosing and administration counseling to ensure proper technique.   Reviewed Wegovy titration schedule, starting with 2.5 mg once weekly to a goal of 2.4 mg once weekly if tolerated  Counseled patient on the benefits of GLP-1ra glycemic control and weight loss  Reviewed storage requirements of Wegovy when not in use, and when to administer the medication if a dose is missed.  Advised patient that they may experience improved satiety after meals and portion sizes of meals may be reduced as doses of Wegovy increase.  Counseled patient on relevant MOA, expectations, side effects, duration of therapy, contraindications, administration, and monitoring parameters.  All questions and concerns addressed. Contact pharmacist with any further questions or concerns prior to next appointment.

## 2025-05-28 ENCOUNTER — PHARMACY VISIT (OUTPATIENT)
Dept: PHARMACY | Facility: CLINIC | Age: 70
End: 2025-05-28
Payer: COMMERCIAL

## 2025-05-30 DIAGNOSIS — N20.0 NEPHROLITHIASIS: ICD-10-CM

## 2025-06-03 ENCOUNTER — APPOINTMENT (OUTPATIENT)
Dept: PHARMACY | Facility: HOSPITAL | Age: 70
End: 2025-06-03
Payer: MEDICARE

## 2025-06-05 ENCOUNTER — APPOINTMENT (OUTPATIENT)
Dept: UROLOGY | Facility: CLINIC | Age: 70
End: 2025-06-05
Payer: MEDICARE

## 2025-06-10 ENCOUNTER — APPOINTMENT (OUTPATIENT)
Dept: UROLOGY | Facility: CLINIC | Age: 70
End: 2025-06-10
Payer: MEDICARE

## 2025-06-10 VITALS
BODY MASS INDEX: 28.32 KG/M2 | HEART RATE: 87 BPM | HEIGHT: 63 IN | WEIGHT: 159.83 LBS | DIASTOLIC BLOOD PRESSURE: 80 MMHG | SYSTOLIC BLOOD PRESSURE: 138 MMHG

## 2025-06-10 DIAGNOSIS — R82.71 BACTERIURIA: ICD-10-CM

## 2025-06-10 DIAGNOSIS — N20.0 NEPHROLITHIASIS: ICD-10-CM

## 2025-06-10 DIAGNOSIS — R82.90 ABNORMAL URINE FINDINGS: Primary | ICD-10-CM

## 2025-06-10 LAB
POC APPEARANCE, URINE: ABNORMAL
POC BILIRUBIN, URINE: NEGATIVE
POC BLOOD, URINE: ABNORMAL
POC COLOR, URINE: YELLOW
POC GLUCOSE, URINE: NEGATIVE MG/DL
POC KETONES, URINE: NEGATIVE MG/DL
POC LEUKOCYTES, URINE: ABNORMAL
POC NITRITE,URINE: POSITIVE
POC PH, URINE: 5.5 PH
POC PROTEIN, URINE: ABNORMAL MG/DL
POC SPECIFIC GRAVITY, URINE: 1.02
POC UROBILINOGEN, URINE: 0.2 EU/DL

## 2025-06-10 PROCEDURE — 3008F BODY MASS INDEX DOCD: CPT

## 2025-06-10 PROCEDURE — 81003 URINALYSIS AUTO W/O SCOPE: CPT

## 2025-06-10 PROCEDURE — 99024 POSTOP FOLLOW-UP VISIT: CPT

## 2025-06-10 PROCEDURE — 1159F MED LIST DOCD IN RCRD: CPT

## 2025-06-10 NOTE — PATIENT INSTRUCTIONS
KIDNEY STONE PREVENTION:  -Drink 80-90 ounces of fluids per day (mostly water).  -Add fresh lemon or lime to water.  -Urine should be light yellow and clear by lunchtime.  -Things to AVOID: iced tea (black or green tea in moderation), dark uzma, energy drinks, foods high in sodium (deli meats, canned goods, fast food), foods high in oxalates (dark leafy green, nuts, dark chocolate), high doses of vitamin C supplements, TUMS.    UTI PREVENTION  Vaginal Estrogen Cream  -this is a prescription that was sent to your pharmacy.  -apply pea size amount to finger & insert into vagina every day for 2 weeks and then 3x per week (M/W/F) thereafter.  -set reminders in phone.    D-Mannose  -over the counter supplement.  -take 2000 mg daily.  -comes in capsule or powder formulation.  -purchase at: [employer], vitamin store.  -you can use any brand, a popular one is NOW.

## 2025-06-10 NOTE — PROGRESS NOTES
"Subjective   Patient ID: Mikaela Cárdenas is a 70 y.o. female who presents for Follow-up (Kidney stone removal).    HPI  Patient with a PMH of polycystic kidney disease and polycystic liver disease presents for kidney stone fu after mini PCNL on 04/04/2025.  Had a renal US on 05/27 which showed a residual nonobstructing 0.9cm L stone.  Per Dr. Reese notes, did not see any remnant stones remaining after surgery but CT with stone protocol ordered.  She was rx'd sodium bicarbonate to alkalinize urine as she couldn't tolerate potassium citrate.  UA nitrite positive today.    Patient has been feeling well.  No flank pain.  No dysuria or gross hematuria.  No urinary sxs.  Urine nitrite positive today, she's asymptomatic.  Did notice her urine was cloudy.  Reports her only sx is usually getting a fever.  She has vaginal estrogen cream but admits she forgets to use it.  Taking sodium bicarbonate when she remembers.  CT is scheduled in 2 days.    HPI as of 04/10/2025 with Dr. Reese  Nephrolithiasis:  - Ms. Cárdenas is status post left mini PCNL performed on 04/04/2025 for treatment of multiple branching stones, which appeared to be completely treated intraoperatively.  - Stone analysis confirmed uric acid nephrolithiasis.  - Pre-operative urinalysis showed pH of 5.5.  - She reports improvement in symptoms with some pain in the first few days post-operatively that has now resolved.  - She discontinued potassium citrate previously due to difficulty swallowing the tablets.  - Right kidney still has multiple smaller non-obstructing stones that will be managed medically.     Recurrent UTIs:  - History of recurrent UTIs, likely related to stone burden.  - Reports being \"very sensitive\" in the genital area.  - Confirms history of frequent UTIs and hematuria.  - Most recent urine culture on 02/20/2025 was positive for E. coli.    Objective   Physical Exam  Vitals:    06/10/25 1057   BP: 138/80   Pulse: 87     Alert and oriented x3  No " acute distress, cooperative  Breathes easily on room air  Normal range of motion  No focal neurological deficits  Appears stated age    Results for orders placed or performed in visit on 06/10/25 (from the past 24 hours)   POCT UA Automated manually resulted   Result Value Ref Range    POC Color, Urine Yellow Straw, Yellow, Light-Yellow    POC Appearance, Urine Cloudy (A) Clear    POC Glucose, Urine NEGATIVE NEGATIVE mg/dl    POC Bilirubin, Urine NEGATIVE NEGATIVE    POC Ketones, Urine NEGATIVE NEGATIVE mg/dl    POC Specific Gravity, Urine 1.020 1.005 - 1.035    POC Blood, Urine SMALL (1+) (A) NEGATIVE    POC PH, Urine 5.5 No Reference Range Established PH    POC Protein, Urine 30 (1+) (A) NEGATIVE mg/dl    POC Urobilinogen, Urine 0.2 0.2, 1.0 EU/DL    Poc Nitrite, Urine POSITIVE (A) NEGATIVE    POC Leukocytes, Urine LARGE (3+) (A) NEGATIVE     === 05/27/25 ===    US RENAL COMPLETE    - Impression -  1. Bilateral simple renal cysts.  2. 1.5 cm Bosniak 2 left renal cysts.  3. Nonobstructing 0.9 cm left intrarenal calculus.    MACRO:  None    Signed by: Brittany Reddy 5/28/2025 3:06 PM  Dictation workstation:   [id number]    Assessment/Plan   Diagnoses and all orders for this visit:  Abnormal urine findings  -     Urinalysis with Reflex Culture and Microscopic  Nephrolithiasis  -     Follow Up In Urology  -     POCT UA Automated manually resulted  Bacteriuria  -     Urinalysis with Reflex Culture and Microscopic    Patient with hx of left renal stones s/p left PCNL presents for fu.  Urine pH is 5.5, encouraged her to continue sodium bicarbonate for stone prevention.  She will keep upcoming CT scan, she is reluctant to have another procedure.  As for her positive urinalysis, will send for culture tx pending as she's asymptomatic.  This could be asymptomatic bacteriuria, I asked that she resume vaginal estrogen cream 3x/week and start OTC d-mannose.  Will await results of CT scan and further advise.  Patient agrees  with plan and all questions answered.    PLAN:  Urinalysis + reflex culture  Start d-mannose  Resume vaginal estrogen cream  Continue sodium bicarbonate  RTO pending results of CT    Dionne Mckeon PA-C 06/10/25 11:09 AM

## 2025-06-12 ENCOUNTER — APPOINTMENT (OUTPATIENT)
Dept: RADIOLOGY | Facility: HOSPITAL | Age: 70
End: 2025-06-12
Payer: MEDICARE

## 2025-06-12 DIAGNOSIS — N20.0 NEPHROLITHIASIS: ICD-10-CM

## 2025-06-12 LAB
APPEARANCE UR: ABNORMAL
BACTERIA #/AREA URNS HPF: ABNORMAL /HPF
BACTERIA UR CULT: ABNORMAL
BACTERIA UR CULT: ABNORMAL
BILIRUB UR QL STRIP: NEGATIVE
CAOX CRY #/AREA URNS HPF: ABNORMAL /HPF
COLOR UR: YELLOW
GLUCOSE UR QL STRIP: NEGATIVE
HGB UR QL STRIP: ABNORMAL
HYALINE CASTS #/AREA URNS LPF: ABNORMAL /LPF
KETONES UR QL STRIP: ABNORMAL
LEUKOCYTE ESTERASE UR QL STRIP: ABNORMAL
NITRITE UR QL STRIP: NEGATIVE
PH UR STRIP: 5.5 [PH] (ref 5–8)
PROT UR QL STRIP: ABNORMAL
RBC #/AREA URNS HPF: ABNORMAL /HPF
SERVICE CMNT-IMP: ABNORMAL
SP GR UR STRIP: 1.02 (ref 1–1.03)
SQUAMOUS #/AREA URNS HPF: ABNORMAL /HPF
WBC #/AREA URNS HPF: ABNORMAL /HPF

## 2025-06-12 PROCEDURE — 74176 CT ABD & PELVIS W/O CONTRAST: CPT

## 2025-06-12 PROCEDURE — 74176 CT ABD & PELVIS W/O CONTRAST: CPT | Performed by: RADIOLOGY

## 2025-06-16 ENCOUNTER — APPOINTMENT (OUTPATIENT)
Dept: OBSTETRICS AND GYNECOLOGY | Facility: CLINIC | Age: 70
End: 2025-06-16
Payer: MEDICARE

## 2025-06-17 ENCOUNTER — APPOINTMENT (OUTPATIENT)
Dept: PHARMACY | Facility: HOSPITAL | Age: 70
End: 2025-06-17
Payer: MEDICARE

## 2025-06-17 PROCEDURE — RXMED WILLOW AMBULATORY MEDICATION CHARGE

## 2025-06-17 RX ORDER — SEMAGLUTIDE 1.7 MG/.75ML
1.7 INJECTION, SOLUTION SUBCUTANEOUS WEEKLY
Qty: 3 ML | Refills: 2 | Status: SHIPPED | OUTPATIENT
Start: 2025-06-17

## 2025-06-17 NOTE — PROGRESS NOTES
Clinical Pharmacy Appointment    Patient ID: Mikaela Cárdenas is a 70 y.o. female who presents for Weight Loss.    Referring Provider: Daija Spencer,   PCP: Daija Spencer MD   Last visit with PCP: 11/11/24  Next visit with PCP: Not scheduled      Patient Assistance for Wegovy approved through 12/2/2025. Will have to be renewed prior to that date to prevent lapse in coverage. Medication(s) will be received at no cost to patient from [facility].    Subjective   Interval History:  Continues on Wegovy 1.7 mg  Did have some diarrhea initially, however has changed diet and this has resolved  Reports appetite suppression has increased and losing weight again  Down to 158 pounds    WEIGHT LOSS  BMI Readings from Last 3 Encounters:   06/10/25 28.31 kg/m²   04/12/25 29.94 kg/m²   04/04/25 29.76 kg/m²      Starting weight: 184 lbs. (11/11/24)  -178 lbs. (12/24/24)  -174 lbs (1/14/25)  Current weight: 158 lbs. (6/17/25)     Lifestyle  Diet: Using Weight Watchers, drinking water     Non-Pharmacological Therapy  Weight loss techniques attempted:  Self-directed dieting: Weight Watchers     Pharmacological Therapy  Current Medications: Wegovy 1.7 mg subcutaneously once weekly on Tuesdays  Adverse Effects: Denies     Insurance coverage of weight-loss medications? Yes, Medicare covers for reduction of cardiovascular disease   Eligible for copay cards/programs? No, government funded insurance  Eligible for  PAP? Yes, reports income <400% FPL     Drug Interactions  No relevant drug interactions were noted.     Objective     LMP  (LMP Unknown)      Labs  Lab Results   Component Value Date    BILITOT 0.4 11/11/2024    CALCIUM 9.1 03/18/2025    CO2 26 03/18/2025     03/18/2025    CREATININE 0.83 03/18/2025    GLUCOSE 93 03/18/2025    ALKPHOS 91 11/11/2024    K 4.1 03/18/2025    PROT 6.7 11/11/2024     03/18/2025    AST 15 11/11/2024    ALT 21 11/11/2024    BUN 22 03/18/2025    ANIONGAP 12 03/18/2025     ALBUMIN 4.1 11/11/2024    GFRF 64 05/09/2023     Lab Results   Component Value Date    TRIG 98 05/07/2024    CHOL 125 05/07/2024    LDLCALC 65 05/07/2024    HDL 40.9 05/07/2024     Lab Results   Component Value Date    HGBA1C 5.4 11/11/2024       Medications Ordered Prior to Encounter[1]     Assessment/Plan   Problem List Items Addressed This Visit           ICD-10-CM    BMI 33.0-33.9,adult Z68.33    Current regimen includes Wegovy 1.7 mg subcutaneously once weekly. Patient's current weight reported as 158 pounds on 6/17/25. Has lost about 26 pounds since starting therapy (~14% change in total body weight).      Will plan to continue Wegovy dose today as patient is losing weight on current dose and reporting appetite suppression.      Medication Changes: None     CONTINUE  Wegovy to 1.7 mg subcutaneously once weekly. Refills sent to [facility] pharmacy for delivery.     Future Considerations:  Titrate GLP1a as needed     Monitoring and Education:  Provided detailed dosing and administration counseling to ensure proper technique.   Reviewed Wegovy titration schedule, starting with 2.5 mg once weekly to a goal of 2.4 mg once weekly if tolerated  Counseled patient on the benefits of GLP-1ra glycemic control and weight loss  Reviewed storage requirements of Wegovy when not in use, and when to administer the medication if a dose is missed.  Advised patient that they may experience improved satiety after meals and portion sizes of meals may be reduced as doses of Wegovy increase.  Counseled patient on relevant MOA, expectations, side effects, duration of therapy, contraindications, administration, and monitoring parameters.  All questions and concerns addressed. Contact pharmacist with any further questions or concerns prior to next appointment.           Merly Wood, KavonD  Clinical Ambulatory Care Pharmacist  Ph: [phone]    Continue all meds under the continuation of care with the referring provider and  clinical pharmacy team.    Clinical Pharmacist follow up: 8/12/25 or sooner as needed based on clinical intervention  Type of Encounter:  Telephone    Verbal consent to manage patient's drug therapy was obtained from the patient. They were informed they may decline to participate or withdraw from participation in pharmacy services at any time.       [1]   Current Outpatient Medications on File Prior to Visit   Medication Sig Dispense Refill    alendronate (Fosamax) 70 mg tablet Take 1 tablet (70 mg) by mouth every 7 days. Take in the morning with a full glass of water, on an empty stomach, and do not take anything else by mouth or lie down for the next 30 min. 30 tablet 3    ALPRAZolam (Xanax) 0.5 mg tablet Take 1 tablet (0.5 mg) by mouth 2 times a day as needed for sleep. 30 tablet 1    aspirin 81 mg EC tablet Take 1 tablet (81 mg) by mouth once daily.      atorvastatin (Lipitor) 80 mg tablet Take 1 tablet (80 mg) by mouth once daily. 90 tablet 3    estradiol (Estrace) 0.01 % (0.1 mg/gram) vaginal cream Apply pea sized amount nightly for 2 weeks, then 3 times per week. 127.5 g 3    lisinopril 5 mg tablet Take 1 tablet (5 mg) by mouth once daily. 90 tablet 3    metoprolol succinate XL (Toprol-XL) 25 mg 24 hr tablet Take 1 tablet (25 mg) by mouth once daily. 90 tablet 3    nitroglycerin (Nitrostat) 0.4 mg SL tablet Place 1 tablet (0.4 mg) under the tongue every 5 minutes if needed for chest pain. FOR CHEST PAIN 25 tablet 0    semaglutide, weight loss, (Wegovy) 1.7 mg/0.75 mL pen injector Inject 1.7 mg under the skin 1 (one) time per week. 3 mL 0    sertraline (Zoloft) 100 mg tablet TAKE ONE TABLET BY MOUTH once DAILY 90 tablet 1    sodium bicarbonate 650 mg tablet Take 1 tablet (650 mg) by mouth 3 times a day. 180 tablet 5    traZODone (Desyrel) 50 mg tablet TAKE ONE TABLET BY MOUTH AT BEDTIME AS NEEDED for sleep 90 tablet 1     No current facility-administered medications on file prior to visit.

## 2025-06-17 NOTE — ASSESSMENT & PLAN NOTE
Current regimen includes Wegovy 1.7 mg subcutaneously once weekly. Patient's current weight reported as 158 pounds on 6/17/25. Has lost about 26 pounds since starting therapy (~14% change in total body weight).      Will plan to continue Wegovy dose today as patient is losing weight on current dose and reporting appetite suppression.      Medication Changes: None     CONTINUE  Wegovy to 1.7 mg subcutaneously once weekly. Refills sent to [facility] pharmacy for delivery.     Future Considerations:  Titrate GLP1a as needed     Monitoring and Education:  Provided detailed dosing and administration counseling to ensure proper technique.   Reviewed Wegovy titration schedule, starting with 2.5 mg once weekly to a goal of 2.4 mg once weekly if tolerated  Counseled patient on the benefits of GLP-1ra glycemic control and weight loss  Reviewed storage requirements of Wegovy when not in use, and when to administer the medication if a dose is missed.  Advised patient that they may experience improved satiety after meals and portion sizes of meals may be reduced as doses of Wegovy increase.  Counseled patient on relevant MOA, expectations, side effects, duration of therapy, contraindications, administration, and monitoring parameters.  All questions and concerns addressed. Contact pharmacist with any further questions or concerns prior to next appointment.

## 2025-06-18 ENCOUNTER — PHARMACY VISIT (OUTPATIENT)
Dept: PHARMACY | Facility: CLINIC | Age: 70
End: 2025-06-18
Payer: COMMERCIAL

## 2025-06-24 ENCOUNTER — APPOINTMENT (OUTPATIENT)
Dept: OBSTETRICS AND GYNECOLOGY | Facility: CLINIC | Age: 70
End: 2025-06-24
Payer: MEDICARE

## 2025-06-24 VITALS
BODY MASS INDEX: 27.41 KG/M2 | SYSTOLIC BLOOD PRESSURE: 132 MMHG | DIASTOLIC BLOOD PRESSURE: 78 MMHG | HEIGHT: 63 IN | WEIGHT: 154.7 LBS

## 2025-06-24 DIAGNOSIS — Z91.89 FRACTURE RISK ASSESSMENT SCORE (FRAX) INDICATING GREATER THAN 3% RISK FOR HIP FRACTURE: ICD-10-CM

## 2025-06-24 DIAGNOSIS — M85.851 OSTEOPENIA OF NECKS OF BOTH FEMURS: ICD-10-CM

## 2025-06-24 DIAGNOSIS — Z91.89 GYN EXAM FOR HIGH-RISK MEDICARE PATIENT: Primary | ICD-10-CM

## 2025-06-24 DIAGNOSIS — K21.9 GASTROESOPHAGEAL REFLUX DISEASE WITHOUT ESOPHAGITIS: ICD-10-CM

## 2025-06-24 DIAGNOSIS — M85.851 OSTEOPENIA OF NECKS OF BOTH FEMURS: Primary | ICD-10-CM

## 2025-06-24 DIAGNOSIS — M85.852 OSTEOPENIA OF NECKS OF BOTH FEMURS: ICD-10-CM

## 2025-06-24 DIAGNOSIS — D06.9 HIGH GRADE SQUAMOUS INTRAEPITHELIAL LESION (HGSIL), GRADE 3 CIN, ON BIOPSY OF CERVIX: ICD-10-CM

## 2025-06-24 DIAGNOSIS — Z12.4 SCREENING FOR CERVICAL CANCER: ICD-10-CM

## 2025-06-24 DIAGNOSIS — M85.852 OSTEOPENIA OF NECKS OF BOTH FEMURS: Primary | ICD-10-CM

## 2025-06-24 PROCEDURE — 1036F TOBACCO NON-USER: CPT | Performed by: OBSTETRICS & GYNECOLOGY

## 2025-06-24 PROCEDURE — 1159F MED LIST DOCD IN RCRD: CPT | Performed by: OBSTETRICS & GYNECOLOGY

## 2025-06-24 PROCEDURE — 3008F BODY MASS INDEX DOCD: CPT | Performed by: OBSTETRICS & GYNECOLOGY

## 2025-06-24 PROCEDURE — 99397 PER PM REEVAL EST PAT 65+ YR: CPT | Performed by: OBSTETRICS & GYNECOLOGY

## 2025-06-24 PROCEDURE — 87626 HPV SEP HI-RSK TYP&POOL RSLT: CPT

## 2025-06-24 RX ORDER — EPINEPHRINE 0.3 MG/.3ML
0.3 INJECTION SUBCUTANEOUS EVERY 5 MIN PRN
OUTPATIENT
Start: 2025-06-30

## 2025-06-24 RX ORDER — ALBUTEROL SULFATE 0.83 MG/ML
3 SOLUTION RESPIRATORY (INHALATION) AS NEEDED
OUTPATIENT
Start: 2025-06-30

## 2025-06-24 RX ORDER — DIPHENHYDRAMINE HYDROCHLORIDE 50 MG/ML
50 INJECTION, SOLUTION INTRAMUSCULAR; INTRAVENOUS AS NEEDED
OUTPATIENT
Start: 2025-06-30

## 2025-06-24 RX ORDER — FAMOTIDINE 10 MG/ML
20 INJECTION, SOLUTION INTRAVENOUS ONCE AS NEEDED
OUTPATIENT
Start: 2025-06-30

## 2025-06-24 NOTE — PROGRESS NOTES
"GYNECOLOGY PROGRESS NOTE      CC:    Chief Complaint   Patient presents with    Annual Exam     Est patient - annual exam - no other issues  Pap 5/2023 neg w/HPV neg  Mamm 7/2024 scat fibro tissue/ patient would like to skip this year  Dexa 6/2023 osteopenia  Colon 2016  Chaperone luisa alicia ma        HPI:  Mikaela Cárdenas is here for a Medicare high-risk GYN examination with repeat pap/HPV testing after CKC for HGSIL in 2022.  No GYN c/o, no AUB.      Patient having side effects of heartburn with taking Fosamax despite following the instructions, is avoiding taking the medication due to the severity of her side effects and would like to discuss Prolia instead if her insurance will cover it.      Depression screen:  negative  Fall screen:  negative  DV screen:  negative      ROS:  GI - + GERD  URO - no hematuria  GYN - no AUB or vaginal discharge  PSYCH - mood OK        PHYSICAL EXAM:  /78 (BP Location: Left arm, Patient Position: Sitting)   Ht 1.6 m (5' 3\")   Wt 70.2 kg (154 lb 11.2 oz)   LMP  (LMP Unknown)   BMI 27.40 kg/m²   GEN:  A&O, NAD  ABD:  NT/ND, soft, no palpable masses  URO:  atrophic urethral meatus, no bladder TTP  GYN:    -atrophic vulva w/o lesions or ulcers  -atrophic vagina without discharge or lesions  -normal cervix without lesions or discharge or CMT, + CKC scarring  -uterus NT/NE  -adnexa mobile and NT/NE  -perineum w/o lesions or ulcers  -rectal  no external hemorrhoids or lesions, internal exam deferred  BREAST:  no masses or TTP, no skin lesions or nipple discharge  PSYCH:  normal affect, non-anxious        IMPRESSION/PLAN:  Problem List Items Addressed This Visit       High grade squamous intraepithelial lesion (HGSIL), grade 3 SYED, on biopsy of cervix    Osteopenia of necks of both femurs     Other Visit Diagnoses         GYN exam for high-risk Medicare patient    -  Primary           HGSIL:  S/P LEEP for HGSIL/CIN3 4/2022 .  Pap/HPV wnl 11/2022 andf 5/2023.  Per [facility]P guidelines " post-LEEP surveillance plan is repeat Pap/HPV testing yearly for 3 years (until 2025) then every 3 years after HGSIL diagnosis (2027) 6 months.  Repeat pap/HPV done today.  Smoking cessation:  n/a.  Next pap/HPV due in 1 year.    Mammogram up to date and normal.  Density is scattered.  Elects for screening mammograms every other year.    Colon CA screening:  wnl in 2016.    DEXA scan in 2023 with osteopenia.  Osteoporosis risk factors= postmenopausal fracture.  Osteopenia:  2023 DEXA results with osteopenia reviewed with patient previously, T-score -2.1.  Osteoporosis risk factors= history of postmenopausal fracture.  FRAX calculator results=3.1% risk of hip fracture in next 10 years and 17.1% risk of any fracture in the next 10 years.  Started on Rx Fosamax as insurance would not cover Prolia but having significant GERD side effects despite taking as prescribed.  Repeat DEXA planned in 3 years--due in 2026.  Recommend changing Rx to Prolia due to its bone-building properties.  Weight-bearing exercise, and avoidance of hazards around the home discussed.      Prolia counseling:  Discussed with patient the most common risks and side effects of Prolia use along with the rare risks of osteonecrosis of the jaw (0.3%--majority of cases resolve without any intervention) and atypical femur fractures (0.04%).  Discussed that she would need to be on therapy for at least 5 and most likely 10 years, as there is an elevated fracture risk after stopping the medication (due to rapid regression of bone density) if an alternate osteoporosis treatment is not started after stopping Prolia.  Discussed with the patient that Prolia is the only osteoporosis medication that improves bone density.  Discussed with the patient that Prolia decreases the risk for spine fractures by 60%, hip fractures by 40%, and other fractures by 20%.  Potential atul effects of Prolia discussed with the patient including allergic reaction, back pain  (34.7%--risk with placebo is 34.6%), extremity pain (11.7%--risk with placebo is 11.1%), muscle pain (7.6%--risk with placebo is 7.5%), high cholesterol (7.2%--risk with placebo is 6.1% ), bladder infections (5.9%--risk with placebo is 5.8%) and serious infections (4.0%--risk with placebo is 3.3%).   Discussed with the patient that the office staff will prior authorize the injections through her insurance and notify the patient to schedule a nurse appointment for her 1st injection as well as future subsequent injections thereafter every 6 months.  Per prescribing guidelines to prevent hypocalcemia with use of Prolia patient needs to be on OTC calcium 1000 mg daily and vitamin D 400 IU daily.      In 3 years for next GYN examination with repeat Pap and HPV testing, follow-up visit deferred from 2 years as patient will not be due for her repeat Pap and HPV testing for 3 years.        Jt Odell, DO

## 2025-06-30 DIAGNOSIS — M85.852 OSTEOPENIA OF NECKS OF BOTH FEMURS: ICD-10-CM

## 2025-06-30 DIAGNOSIS — M85.851 OSTEOPENIA OF NECKS OF BOTH FEMURS: ICD-10-CM

## 2025-07-08 LAB
CYTOLOGY CMNT CVX/VAG CYTO-IMP: NORMAL
HPV HR 12 DNA GENITAL QL NAA+PROBE: NEGATIVE
HPV HR GENOTYPES PNL CVX NAA+PROBE: NEGATIVE
HPV16 DNA SPEC QL NAA+PROBE: NEGATIVE
HPV18 DNA SPEC QL NAA+PROBE: NEGATIVE
LAB AP HPV GENOTYPE QUESTION: YES
LAB AP HPV HR: NORMAL
LABORATORY COMMENT REPORT: NORMAL
PATH REPORT.TOTAL CANCER: NORMAL

## 2025-07-15 PROCEDURE — RXMED WILLOW AMBULATORY MEDICATION CHARGE

## 2025-07-16 ENCOUNTER — PHARMACY VISIT (OUTPATIENT)
Dept: PHARMACY | Facility: CLINIC | Age: 70
End: 2025-07-16
Payer: COMMERCIAL

## 2025-07-25 ENCOUNTER — TELEPHONE (OUTPATIENT)
Dept: PRIMARY CARE | Facility: CLINIC | Age: 70
End: 2025-07-25
Payer: MEDICARE

## 2025-07-25 DIAGNOSIS — R26.2 DIFFICULTY WALKING: ICD-10-CM

## 2025-07-25 DIAGNOSIS — I25.2 HISTORY OF MI (MYOCARDIAL INFARCTION): ICD-10-CM

## 2025-08-05 ENCOUNTER — APPOINTMENT (OUTPATIENT)
Dept: CARDIOLOGY | Facility: CLINIC | Age: 70
End: 2025-08-05
Payer: MEDICARE

## 2025-08-05 VITALS
HEIGHT: 61 IN | DIASTOLIC BLOOD PRESSURE: 84 MMHG | HEART RATE: 84 BPM | BODY MASS INDEX: 28.51 KG/M2 | WEIGHT: 151 LBS | SYSTOLIC BLOOD PRESSURE: 128 MMHG

## 2025-08-05 DIAGNOSIS — Z95.1 STATUS POST CORONARY ARTERY BYPASS GRAFT: ICD-10-CM

## 2025-08-05 DIAGNOSIS — I25.10 3-VESSEL CAD: ICD-10-CM

## 2025-08-05 DIAGNOSIS — I25.10 ASHD (ARTERIOSCLEROTIC HEART DISEASE): ICD-10-CM

## 2025-08-05 DIAGNOSIS — Z86.79 HISTORY OF VENTRICULAR FIBRILLATION: ICD-10-CM

## 2025-08-05 DIAGNOSIS — E78.5 HYPERLIPIDEMIA, UNSPECIFIED HYPERLIPIDEMIA TYPE: ICD-10-CM

## 2025-08-05 PROCEDURE — 1159F MED LIST DOCD IN RCRD: CPT | Performed by: INTERNAL MEDICINE

## 2025-08-05 PROCEDURE — 99214 OFFICE O/P EST MOD 30 MIN: CPT | Performed by: INTERNAL MEDICINE

## 2025-08-05 PROCEDURE — 3008F BODY MASS INDEX DOCD: CPT | Performed by: INTERNAL MEDICINE

## 2025-08-05 NOTE — PATIENT INSTRUCTIONS
Continue same medications/treatment.  Patient educated on proper medication use.  Patient educated on risk factor modification.  Please bring any lab results from other providers/physicians to your next appointment.    Please bring all medicines, vitamins, and herbal supplements with you when you come to the office.    Prescriptions will not be filled unless you are compliant with your follow up appointments or have a follow up appointment scheduled as per instruction of your physician. Refills should be requested at the time of your visit.    Follow up in 1 year with FASTING blood work to be done 1 week prior to office visit.    VALE LOMELI RN, AM SCRIBING FOR AND IN THE PRESENCE OF DR. PRABHA BUSTAMANTE M.D., [facility]

## 2025-08-05 NOTE — PROGRESS NOTES
Patient:  Mikaela Cárdenas  YOB: 1955  MRN: [id number]       Chief Complaint:      Chief Complaint   Patient presents with    Follow-up     CAD PCI formerly saw Dr Lazaro       HPI:       Mikaela Cárdenas is a 70 y.o. female who returns today for cardiac follow-up.  She was most recently seen by Dr. Tyrell Lazaro in [place] on August 16, 2024.  She has a history of atherosclerotic heart disease with previous anterior myocardial infarction in 2018.  This was complicated by ventricular fibrillation arrest.  She underwent emergent revascularization of the diagonal branch and subsequent staged interventions of the 1st and 2nd circumflex and obtuse marginal branches and of the right coronary artery.  She has well-preserved LV systolic function.  A myocardial perfusion study May 4, 2023 was negative for ischemia or infarction.  Calculated LV ejection fraction 65%.  She has a history of hypertension and hyperlipidemia.  She moved to the [facility].    She generally has been doing well since her last visit.  She remains active without limitations.  She denies any recent chest pain or shortness breath.  She denies any orthopnea, PND, or increasing peripheral edema.  She denies any palpitations, lightheadedness, near-syncope, or syncope.  She denies any fever, chills, or cough.  She denies any nausea, vomiting, or diaphoresis.  She denies any hemoptysis, hematemesis, melena, or hematochezia.  Lab studies March 18, 2025 showed a normal CBC and basic metabolic profile.  Lipid profile May 7, 2004 showed a cholesterol 125 with HDL 41, LDL 65, and triglycerides 98.  Other details as noted below.      Objective:     Vitals:    08/05/25 1023   BP: 128/84   Pulse: 84       Wt Readings from Last 4 Encounters:   06/24/25 70.2 kg (154 lb 11.2 oz)   06/10/25 72.5 kg (159 lb 13.3 oz)   04/12/25 76.7 kg (169 lb)   04/04/25 76.2 kg (168 lb)       Allergies:     Allergies[1]       Medications:     Current Outpatient Medications    Medication Instructions    alendronate (FOSAMAX) 70 mg, oral, Every 7 days, Take in the morning with a full glass of water, on an empty stomach, and do not take anything else by mouth or lie down for the next 30 min.    ALPRAZolam (XANAX) 0.5 mg, oral, 2 times daily PRN    aspirin 81 mg EC tablet 1 tablet, Daily    atorvastatin (LIPITOR) 80 mg, oral, Daily    denosumab (PROLIA) 60 mg, subcutaneous, Once    estradiol (Estrace) 0.01 % (0.1 mg/gram) vaginal cream Apply pea sized amount nightly for 2 weeks, then 3 times per week.    lisinopril 5 mg, oral, Daily    metoprolol succinate XL (TOPROL-XL) 25 mg, oral, Daily    nitroglycerin (NITROSTAT) 0.4 mg, sublingual, Every 5 min PRN, FOR CHEST PAIN    sertraline (ZOLOFT) 100 mg, oral, Daily    sodium bicarbonate 650 mg, oral, 3 times daily    traZODone (DESYREL) 50 mg, oral, Nightly PRN    Wegovy 1.7 mg, subcutaneous, Weekly       Past Medical History:     Medical History[2]    Social History:     Social History[3]    Family History     Family History[4]    Physical Examination:   GENERAL:  Well developed, well nourished, in no acute distress.  CHEST:  Symmetric and nontender.  NEURO/PSYCH:  Alert and oriented times three with approppriate behavior and responses.  NECK:  Supple, no JVD, no bruit.  LUNGS:  Clear to auscultation bilaterally, normal respiratory effort.  HEART:  Rate and rhythm regular with no evident murmur, no gallop appreciated.        There are no rubs, clicks or heaves.  EXTREMITIES:  Warm with good color, no clubbing or cyanosis.  There is no edema noted.  PERIPHERAL VASCULAR:  Pulses present and equally palpable; 2+ throughout.      Lab:     CBC:   Lab Results   Component Value Date    WBC 6.5 03/18/2025    RBC 3.94 (L) 03/18/2025    HGB 12.2 03/18/2025    HCT 35.6 (L) 03/18/2025     03/18/2025        CMP:    Lab Results   Component Value Date     03/18/2025    K 4.1 03/18/2025     03/18/2025    CO2 26 03/18/2025    BUN 22  03/18/2025    CREATININE 0.83 03/18/2025    GLUCOSE 93 03/18/2025    CALCIUM 9.1 03/18/2025       Lipid Profile:    Lab Results   Component Value Date    TRIG 98 05/07/2024    HDL 40.9 05/07/2024    LDLCALC 65 05/07/2024       BMP:  Lab Results   Component Value Date     03/18/2025     01/27/2025     11/11/2024     05/07/2024    K 4.1 03/18/2025    K 4.5 01/27/2025    K 4.0 11/11/2024    K 4.1 05/07/2024     03/18/2025     01/27/2025     (H) 11/11/2024     (H) 05/07/2024    CO2 26 03/18/2025    CO2 27 01/27/2025    CO2 28 11/11/2024    CO2 24 05/07/2024    BUN 22 03/18/2025    BUN 24 01/27/2025    BUN 29 (H) 11/11/2024    BUN 36 (H) 05/07/2024    CREATININE 0.83 03/18/2025    CREATININE 0.79 01/27/2025    CREATININE 0.94 11/11/2024    CREATININE 0.97 05/07/2024       CBC:  Lab Results   Component Value Date    WBC 6.5 03/18/2025    WBC 6.9 05/07/2024    WBC 6.4 05/09/2023    WBC 6.4 08/12/2022    WBC 5.5 03/29/2022    RBC 3.94 (L) 03/18/2025    RBC 3.99 (L) 05/07/2024    RBC 4.20 05/09/2023    RBC 4.57 08/12/2022    RBC 4.55 03/29/2022    HGB 12.2 03/18/2025    HGB 12.4 05/07/2024    HGB 12.9 05/09/2023    HGB 13.7 08/12/2022    HGB 13.6 03/29/2022    HCT 35.6 (L) 03/18/2025    HCT 37.7 05/07/2024    HCT 39.9 05/09/2023    HCT 41.1 08/12/2022    HCT 41.7 03/29/2022    MCV 90 03/18/2025    MCV 95 05/07/2024    MCV 95 05/09/2023    MCV 90 08/12/2022    MCV 92 03/29/2022    MCH 31.0 03/18/2025    MCH 31.1 05/07/2024    MCHC 34.3 03/18/2025    MCHC 32.9 05/07/2024    MCHC 32.3 05/09/2023    MCHC 33.3 08/12/2022    MCHC 32.6 03/29/2022    RDW 14.6 (H) 03/18/2025    RDW 13.5 05/07/2024    RDW 13.8 05/09/2023    RDW 12.9 08/12/2022    RDW 14.0 03/29/2022     03/18/2025     05/07/2024     05/09/2023     08/12/2022     03/29/2022       Hepatic Function Panel:    Lab Results   Component Value Date    ALKPHOS 91 11/11/2024    ALT 21 11/11/2024     AST 15 11/11/2024    PROT 6.7 11/11/2024    BILITOT 0.4 11/11/2024       HgBA1c:    Lab Results   Component Value Date    HGBA1C 5.4 11/11/2024       TSH:    Lab Results   Component Value Date    TSH 1.96 05/07/2024       PT/INR:    Lab Results   Component Value Date    PROTIME 11.3 03/18/2025    INR 1.0 03/18/2025       Diagnostic Studies:     CT abdomen pelvis wo IV contrast  Result Date: 6/14/2025  Interpreted By:  Ruperto Sibley, STUDY: CT ABDOMEN PELVIS WO IV CONTRAST;  6/12/2025 4:05 pm   INDICATION: Signs/Symptoms:kidney stone.   ,N20.0 Calculus of kidney   COMPARISON: 02/03/2025 and 09/02/2022.       1.  Nonobstructing renal calculi are again visualized in the bilateral kidneys measuring up to 0.3 cm on the right and 0.4 cm on the left. No evidence of obstructing renal calculi or hydroureteronephrosis bilaterally. 2. Focus of gas within the nondependent portion of the urinary bladder lumen, correlate with history of recent catheterization. 3. Remaining chronic and incidental findings are unchanged as described above.   MACRO: None   Signed by: Ruperto Sibley 6/14/2025 9:21 AM         Problem List:     Problem List[5]    Asessment:     Problem List Items Addressed This Visit       3-vessel CAD    Relevant Orders    Follow Up In Cardiology    CBC    Comprehensive Metabolic Panel    Status post coronary artery bypass graft    Relevant Orders    Follow Up In Cardiology    Comprehensive Metabolic Panel    History of ventricular fibrillation    Relevant Orders    Follow Up In Cardiology    CBC    Comprehensive Metabolic Panel    Hyperlipidemia    Relevant Orders    Follow Up In Cardiology    CBC    Comprehensive Metabolic Panel    ASHD (arteriosclerotic heart disease)    Relevant Orders    Follow Up In Cardiology    CBC    Comprehensive Metabolic Panel    Lipid Panel      I, Maria Isabel Babin RN  am scribing for, and in the presence of Dr. Juan Cannon MD, FACC.    I, Dr. Juan Cannon MD, FACC, personally  performed the services described in the documentation as scribed by Maria Isabel Babin RN  in my presence, and confirm it is both accurate and complete.      Provider Attestation - Scribe documentation    The above portion of this note was dictated by me using voice recognition software.  All medical record entries made by the scribe were at my direction.  The scribe entering the documentation was in my presence.   I have reviewed the chart and agree that the record accurately reflects my personal performance of the history, physical exam, discussion and plan.            [1] No Known Allergies  [2]   Past Medical History:  Diagnosis Date    Anxiety     Distal radius fracture     Heart disease     History of cervical dysplasia 2021    [facility] for HGSIL/CIN3    History of mammogram 07/10/2024    cat 1    History of radius fracture     Hypertension     Nephrolithiasis     Old myocardial infarction     Pap test, as part of routine gynecological examination 06/2024    wnl, hpv neg--5/2023 wnl, hpv neg--11/2022 wnl, hpv neg   [3]   Social History  Tobacco Use    Smoking status: Never     Passive exposure: Never    Smokeless tobacco: Never   Vaping Use    Vaping status: Never Used   Substance Use Topics    Alcohol use: Yes     Comment: rare    Drug use: Never   [4]   Family History  Problem Relation Name Age of Onset    Heart disease Mother      Other (cabg) Mother      Other (cardiac disorder) Mother      Heart disease Father      Other (cardiac disorder) Father      Heart disease Brother      Other (cabg) Brother     [5]   Patient Active Problem List  Diagnosis    3-vessel CAD    Bruit of right carotid artery    Frequent UTI    Status post coronary artery bypass graft    History of ventricular fibrillation    Hyperlipidemia    Joint stiffness    Left flank pain    Uric acid nephrolithiasis    BMI 33.0-33.9,adult    History of MI (myocardial infarction)    Grief reaction    Current moderate episode of major depressive disorder,  unspecified whether recurrent (Multi)    Normal gynecologic examination    Osteopenia of necks of both femurs    High grade squamous intraepithelial lesion (HGSIL), grade 3 SYED, on biopsy of cervix    ASHD (arteriosclerotic heart disease)    Hematuria    Nephrolithiasis

## 2025-08-12 ENCOUNTER — APPOINTMENT (OUTPATIENT)
Dept: PHARMACY | Facility: HOSPITAL | Age: 70
End: 2025-08-12
Payer: MEDICARE

## 2025-08-12 PROCEDURE — RXMED WILLOW AMBULATORY MEDICATION CHARGE

## 2025-08-12 RX ORDER — SEMAGLUTIDE 1.7 MG/.75ML
1.7 INJECTION, SOLUTION SUBCUTANEOUS WEEKLY
Qty: 3 ML | Refills: 2 | Status: SHIPPED | OUTPATIENT
Start: 2025-08-12

## 2025-08-13 ENCOUNTER — PHARMACY VISIT (OUTPATIENT)
Dept: PHARMACY | Facility: CLINIC | Age: 70
End: 2025-08-13
Payer: COMMERCIAL

## 2025-08-18 ENCOUNTER — APPOINTMENT (OUTPATIENT)
Dept: CARDIOLOGY | Facility: CLINIC | Age: 70
End: 2025-08-18
Payer: MEDICARE

## 2025-08-19 ENCOUNTER — APPOINTMENT (OUTPATIENT)
Dept: CARDIOLOGY | Facility: CLINIC | Age: 70
End: 2025-08-19
Payer: MEDICARE

## 2025-08-19 ENCOUNTER — OFFICE VISIT (OUTPATIENT)
Dept: UROLOGY | Facility: CLINIC | Age: 70
End: 2025-08-19
Payer: MEDICARE

## 2025-08-19 VITALS
WEIGHT: 151.24 LBS | DIASTOLIC BLOOD PRESSURE: 70 MMHG | SYSTOLIC BLOOD PRESSURE: 111 MMHG | HEIGHT: 63 IN | BODY MASS INDEX: 26.8 KG/M2 | HEART RATE: 91 BPM

## 2025-08-19 DIAGNOSIS — R10.9 RIGHT SIDED ABDOMINAL PAIN: ICD-10-CM

## 2025-08-19 DIAGNOSIS — R82.71 BACTERIURIA: Primary | ICD-10-CM

## 2025-08-19 DIAGNOSIS — N20.0 NEPHROLITHIASIS: ICD-10-CM

## 2025-08-19 PROCEDURE — G2211 COMPLEX E/M VISIT ADD ON: HCPCS

## 2025-08-19 PROCEDURE — 81003 URINALYSIS AUTO W/O SCOPE: CPT

## 2025-08-19 PROCEDURE — 99214 OFFICE O/P EST MOD 30 MIN: CPT

## 2025-08-19 PROCEDURE — 1159F MED LIST DOCD IN RCRD: CPT

## 2025-08-19 PROCEDURE — 1036F TOBACCO NON-USER: CPT

## 2025-08-19 PROCEDURE — 3008F BODY MASS INDEX DOCD: CPT

## 2025-08-19 RX ORDER — KETOROLAC TROMETHAMINE 10 MG/1
10 TABLET, FILM COATED ORAL EVERY 8 HOURS PRN
Qty: 15 TABLET | Refills: 0 | Status: SHIPPED | OUTPATIENT
Start: 2025-08-19 | End: 2025-08-24

## 2025-08-19 RX ORDER — CIPROFLOXACIN 500 MG/1
500 TABLET, FILM COATED ORAL 2 TIMES DAILY
Qty: 10 TABLET | Refills: 0 | Status: SHIPPED | OUTPATIENT
Start: 2025-08-19 | End: 2025-08-24

## 2025-08-22 LAB
AMORPH SED URNS QL MICRO: ABNORMAL /HPF
APPEARANCE UR: ABNORMAL
BACTERIA #/AREA URNS HPF: ABNORMAL /HPF
BACTERIA UR CULT: ABNORMAL
BACTERIA UR CULT: ABNORMAL
BILIRUB UR QL STRIP: NEGATIVE
COLOR UR: YELLOW
CRYSTALS #/AREA URNS HPF: ABNORMAL /HPF
GLUCOSE UR QL STRIP: NEGATIVE
HGB UR QL STRIP: ABNORMAL
HYALINE CASTS #/AREA URNS LPF: ABNORMAL /LPF
KETONES UR QL STRIP: NEGATIVE
LEUKOCYTE ESTERASE UR QL STRIP: ABNORMAL
NITRITE UR QL STRIP: POSITIVE
PH UR STRIP: 5.5 [PH] (ref 5–8)
PROT UR QL STRIP: ABNORMAL
RBC #/AREA URNS HPF: ABNORMAL /HPF
SERVICE CMNT-IMP: ABNORMAL
SP GR UR STRIP: 1.02 (ref 1–1.03)
SQUAMOUS #/AREA URNS HPF: ABNORMAL /HPF
WBC #/AREA URNS HPF: ABNORMAL /HPF
YEAST #/AREA URNS HPF: ABNORMAL /HPF

## 2025-09-03 PROCEDURE — RXMED WILLOW AMBULATORY MEDICATION CHARGE

## 2025-09-04 ENCOUNTER — PHARMACY VISIT (OUTPATIENT)
Dept: PHARMACY | Facility: CLINIC | Age: 70
End: 2025-09-04
Payer: COMMERCIAL

## 2025-10-14 ENCOUNTER — APPOINTMENT (OUTPATIENT)
Dept: PRIMARY CARE | Facility: CLINIC | Age: 70
End: 2025-10-14
Payer: MEDICARE

## 2025-11-04 ENCOUNTER — APPOINTMENT (OUTPATIENT)
Dept: PHARMACY | Facility: HOSPITAL | Age: 70
End: 2025-11-04
Payer: MEDICARE

## 2026-02-24 ENCOUNTER — APPOINTMENT (OUTPATIENT)
Dept: UROLOGY | Facility: CLINIC | Age: 71
End: 2026-02-24
Payer: MEDICARE

## 2026-08-04 ENCOUNTER — APPOINTMENT (OUTPATIENT)
Dept: CARDIOLOGY | Facility: CLINIC | Age: 71
End: 2026-08-04
Payer: MEDICARE

## (undated) DEVICE — CATHETER, URETERAL, POLLACK, OPEN END, 5.5 FR, 70 CM

## (undated) DEVICE — SHEATH, URETERAL ACCESS, NAVIGATOR 11/13F X 28CM

## (undated) DEVICE — TRAY, SURESTEP, SILICONE DRAINAGE BAG, STATLOCK, 16FR

## (undated) DEVICE — GLOVE, SURGICAL, PROTEXIS PI MICRO, 8.0, PF, LF

## (undated) DEVICE — PREP TRAY, VAGINAL

## (undated) DEVICE — MARKER, SKIN, DUAL TIP, W/RULER AND LABEL

## (undated) DEVICE — Device

## (undated) DEVICE — GUIDEWIRE, AMPLATZ, SUPER STIFF, 035 STRAIGHT

## (undated) DEVICE — BASKET, STONE, RETRIEVAL, NITINOL (4WIRE, 1.9 0 TIP)

## (undated) DEVICE — DRAPE, C-ARM IMAGE

## (undated) DEVICE — SYRINGE, 10 CC, LUER LOCK

## (undated) DEVICE — CRADLE, ARM, FOAM

## (undated) DEVICE — NEEDLE, NAVIGUIDE, PERCUTANEOUS ACCESS, 18G 16CM

## (undated) DEVICE — TUBING, SUCTION, CONNECTING, 9/32 X 10FT, LF

## (undated) DEVICE — SCOPE, LITHOVUE SUD ONLY, GLOBAL STANDARD

## (undated) DEVICE — SOLUTION, IRRIGATION, USP, SODIUM CHLORIDE 0.9%, 3000 ML, BAG

## (undated) DEVICE — SUTURE, MONOCRYL, 4-0, 27 IN, PS-2, UNDYED

## (undated) DEVICE — CONTAINER, SPECIMEN, 4 OZ, OR PEEL PACK, STERILE

## (undated) DEVICE — DRAPE, PCNL

## (undated) DEVICE — IRRIGATION KIT, PUMPING, SINGLE ACTION, SYRINGE, 10 CC

## (undated) DEVICE — COVER HANDLE LIGHT, STERIS, BLUE, STERILE

## (undated) DEVICE — COUNTER, NEEDLE, FOAM BLOCK, W/MAGNET, 20 COUNT

## (undated) DEVICE — TOWEL PACK, STERILE, 4/PACK, BLUE

## (undated) DEVICE — Y-ADAPTER, GATEWAY ADVANTAGE

## (undated) DEVICE — GUIDEWIRE, DUAL SENSOR, .035 X 150 STRAIGHT,  3CM

## (undated) DEVICE — COVER, PLASTIC, MAYO STAND, 29.5IN X 55.5IN

## (undated) DEVICE — SOLUTION, IRRIGATION, SODIUM CHLORIDE 0.9%, 1000 ML, POUR BOTTLE

## (undated) DEVICE — APPLICATOR, CHLORAPREP, W/ORANGE TINT, 26ML

## (undated) DEVICE — ADHESIVE, SKIN, DERMABOND ADVANCED, 15CM, PEN-STYLE

## (undated) DEVICE — SOLUTION, IRRIGATION, STERILE WATER, 1000 ML, POUR BOTTLE

## (undated) DEVICE — GLOVE, SURGICAL, PROTEXIS PI BLUE W/NEUTHERA, 8.0, PF, LF

## (undated) DEVICE — SYRINGE, 20 CC, LUER LOCK, MONOJECT, W/O CAP, LF

## (undated) DEVICE — DILATOR SET, COAXIAL STYLET, 8/10

## (undated) DEVICE — GOWN, SURGICAL, SMARTGOWN, XX-LARGE, STERILE